# Patient Record
Sex: FEMALE | Race: WHITE | NOT HISPANIC OR LATINO | Employment: FULL TIME | URBAN - METROPOLITAN AREA
[De-identification: names, ages, dates, MRNs, and addresses within clinical notes are randomized per-mention and may not be internally consistent; named-entity substitution may affect disease eponyms.]

---

## 2019-03-04 ENCOUNTER — HOSPITAL ENCOUNTER (OUTPATIENT)
Dept: FAMILY MEDICINE CLINIC | Facility: CLINIC | Age: 39
Setting detail: SPECIMEN
Discharge: HOME | End: 2019-03-04
Attending: PREVENTIVE MEDICINE | Admitting: PREVENTIVE MEDICINE

## 2019-03-04 LAB
ALBUMIN SERPL-MCNC: 3.3 G/DL (ref 3.5–4.8)
ALBUMIN SERPL-MCNC: 4 G/DL (ref 3.5–4.8)
ALBUMIN/GLOB SERPL: 1.4 {RATIO} (ref 1–1.7)
ALP SERPL-CCNC: 62 IU/L (ref 32–91)
ALPHA1 GLOB FLD ELPH-MCNC: 0.3 GM/DL (ref 0.1–0.4)
ALPHA2 GLOB SERPL ELPH-MCNC: 0.9 GM/DL (ref 0.5–1)
ALT SERPL-CCNC: 13 IU/L (ref 14–54)
ANION GAP SERPL CALC-SCNC: 14.1 MMOL/L (ref 10–20)
AST SERPL-CCNC: 17 IU/L (ref 15–41)
B-GLOBULIN SERPL ELPH-MCNC: 1.1 GM/DL (ref 0.7–1.4)
BASOPHILS # BLD AUTO: 0.1 10*3/UL (ref 0–0.2)
BASOPHILS NFR BLD AUTO: 1 % (ref 0–2)
BILIRUB SERPL-MCNC: 0.7 MG/DL (ref 0.3–1.2)
BUN SERPL-MCNC: 14 MG/DL (ref 8–20)
BUN/CREAT SERPL: 28 (ref 5.4–26.2)
CALCIUM SERPL-MCNC: 9 MG/DL (ref 8.9–10.3)
CHLORIDE SERPL-SCNC: 103 MMOL/L (ref 101–111)
CHOLEST SERPL-MCNC: 211 MG/DL
CHOLEST/HDLC SERPL: 5.5 {RATIO}
CK SERPL-CCNC: 78 IU/L (ref 38–234)
CONV CO2: 22 MMOL/L (ref 22–32)
CONV LDL CHOLESTEROL DIRECT: 168 MG/DL (ref 0–100)
CONV TOTAL PROTEIN: 6.4 G/DL (ref 6.1–7.9)
CONV TOTAL PROTEIN: 6.9 G/DL (ref 6.1–7.9)
CREAT UR-MCNC: 0.5 MG/DL (ref 0.4–1)
CRP SERPL-MCNC: 1.01 MG/DL (ref 0–0.7)
DIFFERENTIAL METHOD BLD: (no result)
EOSINOPHIL # BLD AUTO: 0.3 10*3/UL (ref 0–0.3)
EOSINOPHIL # BLD AUTO: 3 % (ref 0–3)
ERYTHROCYTE [DISTWIDTH] IN BLOOD BY AUTOMATED COUNT: 13.9 % (ref 11.5–14.5)
ERYTHROCYTE [SEDIMENTATION RATE] IN BLOOD BY WESTERGREN METHOD: 24 MM/HR (ref 0–20)
GAMMA GLOB SERPL ELPH-MCNC: 0.8 GM/DL (ref 0.6–1.6)
GLOBULIN UR ELPH-MCNC: 2.9 G/DL (ref 2.5–3.8)
GLUCOSE SERPL-MCNC: 92 MG/DL (ref 65–99)
HCT VFR BLD AUTO: 40.3 % (ref 35–49)
HDLC SERPL-MCNC: 39 MG/DL
HGB BLD-MCNC: 13.6 G/DL (ref 12–15)
INSULIN SERPL-ACNC: ABNORMAL U[IU]/ML
LDLC/HDLC SERPL: 4.4 {RATIO}
LIPID INTERPRETATION: ABNORMAL
LYMPHOCYTES # BLD AUTO: 2.8 10*3/UL (ref 0.8–4.8)
LYMPHOCYTES NFR BLD AUTO: 28 % (ref 18–42)
MCH RBC QN AUTO: 32.5 PG (ref 26–32)
MCHC RBC AUTO-ENTMCNC: 33.9 G/DL (ref 32–36)
MCV RBC AUTO: 96.1 FL (ref 80–94)
MONOCYTES # BLD AUTO: 0.4 10*3/UL (ref 0.1–1.3)
MONOCYTES NFR BLD AUTO: 4 % (ref 2–11)
NEUTROPHILS # BLD AUTO: 6.5 10*3/UL (ref 2.3–8.6)
NEUTROPHILS NFR BLD AUTO: 64 % (ref 50–75)
NRBC BLD AUTO-RTO: 0 /100{WBCS}
NRBC/RBC NFR BLD MANUAL: 0 10*3/UL
PLATELET # BLD AUTO: 356 10*3/UL (ref 150–450)
PMV BLD AUTO: 8.8 FL (ref 7.4–10.4)
POTASSIUM SERPL-SCNC: 4.1 MMOL/L (ref 3.6–5.1)
RBC # BLD AUTO: 4.19 10*6/UL (ref 4–5.4)
SODIUM SERPL-SCNC: 135 MMOL/L (ref 136–144)
TRIGL SERPL-MCNC: 88 MG/DL
VLDLC SERPL CALC-MCNC: 4.4 MG/DL
WBC # BLD AUTO: 10.1 10*3/UL (ref 4.5–11.5)

## 2019-03-05 LAB
CCP IGG ANTIBODIES: <0.4 U/ML
CONV HIV-1/ HIV-2: NORMAL
CONV HIV-1/ HIV-2: NORMAL

## 2019-03-06 LAB
ANA SER QL IA: NORMAL
CHROMATIN AB SERPL-ACNC: <20 [IU]/ML (ref 0–20)

## 2019-04-25 ENCOUNTER — HOSPITAL ENCOUNTER (OUTPATIENT)
Dept: FAMILY MEDICINE CLINIC | Facility: CLINIC | Age: 39
Setting detail: SPECIMEN
Discharge: HOME | End: 2019-04-25
Attending: PREVENTIVE MEDICINE | Admitting: PREVENTIVE MEDICINE

## 2019-04-25 LAB — HETEROPH AB SER QL LA: NORMAL

## 2019-05-13 ENCOUNTER — HOSPITAL ENCOUNTER (OUTPATIENT)
Dept: ONCOLOGY | Facility: CLINIC | Age: 39
Setting detail: INFUSION SERIES
Discharge: HOME | End: 2019-05-13
Attending: INTERNAL MEDICINE | Admitting: INTERNAL MEDICINE

## 2019-05-13 ENCOUNTER — CLINICAL SUPPORT (OUTPATIENT)
Dept: ONCOLOGY | Facility: HOSPITAL | Age: 39
End: 2019-05-13

## 2019-06-04 ENCOUNTER — HOSPITAL ENCOUNTER (OUTPATIENT)
Dept: ONCOLOGY | Facility: CLINIC | Age: 39
Discharge: HOME | End: 2019-06-04
Attending: INTERNAL MEDICINE | Admitting: INTERNAL MEDICINE

## 2019-08-26 RX ORDER — CLOPIDOGREL BISULFATE 75 MG/1
TABLET ORAL
Qty: 90 TABLET | Refills: 1 | Status: SHIPPED | OUTPATIENT
Start: 2019-08-26 | End: 2020-02-25

## 2019-09-16 ENCOUNTER — OFFICE VISIT (OUTPATIENT)
Dept: FAMILY MEDICINE CLINIC | Facility: CLINIC | Age: 39
End: 2019-09-16

## 2019-09-16 VITALS
OXYGEN SATURATION: 98 % | HEART RATE: 80 BPM | HEIGHT: 60 IN | RESPIRATION RATE: 16 BRPM | BODY MASS INDEX: 31.84 KG/M2 | DIASTOLIC BLOOD PRESSURE: 54 MMHG | WEIGHT: 162.2 LBS | SYSTOLIC BLOOD PRESSURE: 103 MMHG | TEMPERATURE: 98.8 F

## 2019-09-16 DIAGNOSIS — R41.0 CONFUSION: ICD-10-CM

## 2019-09-16 DIAGNOSIS — R42 DIZZINESS: ICD-10-CM

## 2019-09-16 DIAGNOSIS — R47.89 EPISODE OF CHANGE IN SPEECH: Primary | ICD-10-CM

## 2019-09-16 PROBLEM — L50.9 URTICARIA: Status: ACTIVE | Noted: 2019-02-28

## 2019-09-16 PROBLEM — L23.9 CONTACT ALLERGIC REACTION: Status: ACTIVE | Noted: 2018-09-26

## 2019-09-16 PROBLEM — I67.1 CEREBRAL ANEURYSM: Status: ACTIVE | Noted: 2018-08-21

## 2019-09-16 PROBLEM — R51.9 HEADACHE: Status: ACTIVE | Noted: 2018-08-21

## 2019-09-16 PROBLEM — F41.8 MIXED ANXIETY DEPRESSIVE DISORDER: Status: ACTIVE | Noted: 2018-08-21

## 2019-09-16 PROBLEM — L73.2 HIDRADENITIS SUPPURATIVA: Status: ACTIVE | Noted: 2018-08-31

## 2019-09-16 PROBLEM — Z87.19 HX OF PANCREATITIS: Status: ACTIVE | Noted: 2018-08-21

## 2019-09-16 PROCEDURE — 99214 OFFICE O/P EST MOD 30 MIN: CPT | Performed by: PREVENTIVE MEDICINE

## 2019-09-16 RX ORDER — PHENOL 1.4 %
10 AEROSOL, SPRAY (ML) MUCOUS MEMBRANE NIGHTLY
COMMUNITY

## 2019-09-16 RX ORDER — ASPIRIN 81 MG/1
81 TABLET, CHEWABLE ORAL EVERY 24 HOURS
COMMUNITY
Start: 2018-08-21

## 2019-09-16 NOTE — PROGRESS NOTES
"Subjective   Ally Condon is a 38 y.o. female presents for   Chief Complaint   Patient presents with   • Dizziness     er follow    After getting up at 4 and driving to work and starting work-got dizzy , slurred words and had trouble with thought process like when had aneurysm.  Has coiled X3 and staent in carotid in past.  Followsup with neurosurgeon from [facility].  Went to [place] [facility] and had MRA without contrast and no definite evidence of aneurysm and feels they might have seen where coiling done.  POC sugar was 93.  Was diagnosed as Vertigo and advised to followup with PCP.  Patient describes feeling as when had last episode of aneurysm.  No dysuria, chest pain, fever or history of head trauma.  Hs been working 7 days a week in heat and sleeping 6 hours/night for quite a while but is careful to hydrate frequently.  Records requested from [place] and reviewed.    Health Maintenance Due   Topic Date Due   • ANNUAL PHYSICAL  11/30/1981   • TDAP/TD VACCINES (1 - Tdap) 11/30/1997   • PAP SMEAR  05/13/2019   • INFLUENZA VACCINE  08/01/2019       History of Present Illness     Vitals:    09/16/19 1508 09/16/19 1518   BP: 123/84 103/54   BP Location: Left arm Right arm   Patient Position: Sitting Sitting   Cuff Size: Adult Adult   Pulse: 80    Resp: 16    Temp: 98.8 °F (37.1 °C)    TempSrc: Oral    SpO2: 98%    Weight: 73.6 kg (162 lb 3.2 oz)    Height: 152.4 cm (60\")        Current Outpatient Medications on File Prior to Visit   Medication Sig Dispense Refill   • aspirin (ASPIRIN LOW STRENGTH) 81 MG chewable tablet Chew 81 mg Daily.     • clopidogrel (PLAVIX) 75 MG tablet take 1 tablet by mouth once daily 90 tablet 1   • Melatonin 10 MG tablet Take 10 mg by mouth Every Night.     • metoprolol tartrate (LOPRESSOR) 25 MG tablet Take 25 mg by mouth 2 (Two) Times a Day.  1     No current facility-administered medications on file prior to visit.        The following portions of the patient's history were reviewed and " updated as appropriate: allergies, current medications, past family history, past medical history, past social history, past surgical history and problem list.    Review of Systems   HENT: Positive for voice change. Negative for sinus pressure and sore throat.    Eyes: Negative.    Respiratory: Negative.  Negative for cough.    Cardiovascular: Negative.    Gastrointestinal: Negative.    Endocrine: Negative.    Genitourinary: Negative.    Musculoskeletal: Negative.    Skin: Negative.    Allergic/Immunologic: Positive for environmental allergies.   Neurological: Positive for dizziness, speech difficulty, weakness, light-headedness, headache and confusion.   Hematological: Negative.        Objective   Physical Exam   Constitutional: She is oriented to person, place, and time. She appears well-developed.   HENT:   Head: Normocephalic and atraumatic.   Eyes: Conjunctivae and EOM are normal. Pupils are equal, round, and reactive to light.   Neck: Normal range of motion.   Cardiovascular: Normal rate and regular rhythm.   Pulmonary/Chest: Effort normal and breath sounds normal.   Abdominal: Soft. Bowel sounds are normal.   Musculoskeletal: Normal range of motion.   Lymphadenopathy:     She has no cervical adenopathy.   Neurological: She is alert and oriented to person, place, and time.   Visual fields reflexes, rhomberg and drift test all normal   Skin: Skin is warm and dry.   Psychiatric: She has a normal mood and affect.   Nursing note and vitals reviewed.    PHQ-9 Total Score: 0    Assessment/Plan   There are no diagnoses linked to this encounter.    There are no Patient Instructions on file for this visit.

## 2019-09-16 NOTE — PATIENT INSTRUCTIONS
Since feels no worse now than did this am, advise [facility] and followup with [facility] angio and evaluation neurosurgeon.

## 2019-09-19 ENCOUNTER — TRANSITIONAL CARE MANAGEMENT TELEPHONE ENCOUNTER (OUTPATIENT)
Dept: FAMILY MEDICINE CLINIC | Facility: CLINIC | Age: 39
End: 2019-09-19

## 2019-11-25 ENCOUNTER — TELEPHONE (OUTPATIENT)
Dept: FAMILY MEDICINE CLINIC | Facility: CLINIC | Age: 39
End: 2019-11-25

## 2019-11-26 DIAGNOSIS — I67.1 ANEURYSM OF LEFT INTERNAL CAROTID ARTERY: Primary | ICD-10-CM

## 2019-12-06 ENCOUNTER — OFFICE VISIT (OUTPATIENT)
Dept: FAMILY MEDICINE CLINIC | Facility: CLINIC | Age: 39
End: 2019-12-06

## 2019-12-06 VITALS
HEART RATE: 69 BPM | BODY MASS INDEX: 31.96 KG/M2 | HEIGHT: 60 IN | SYSTOLIC BLOOD PRESSURE: 120 MMHG | DIASTOLIC BLOOD PRESSURE: 84 MMHG | TEMPERATURE: 97.9 F | WEIGHT: 162.8 LBS | OXYGEN SATURATION: 99 % | RESPIRATION RATE: 16 BRPM

## 2019-12-06 DIAGNOSIS — N92.6 PERIOD DISORDER: ICD-10-CM

## 2019-12-06 DIAGNOSIS — R05.9 COUGH: Primary | ICD-10-CM

## 2019-12-06 DIAGNOSIS — R53.81 MALAISE AND FATIGUE: ICD-10-CM

## 2019-12-06 DIAGNOSIS — R53.83 MALAISE AND FATIGUE: ICD-10-CM

## 2019-12-06 LAB
EXPIRATION DATE: NORMAL
FLUAV AG NPH QL: NEGATIVE
FLUBV AG NPH QL: NEGATIVE
INTERNAL CONTROL: NORMAL
Lab: NORMAL

## 2019-12-06 PROCEDURE — 99214 OFFICE O/P EST MOD 30 MIN: CPT | Performed by: PREVENTIVE MEDICINE

## 2019-12-06 PROCEDURE — 87804 INFLUENZA ASSAY W/OPTIC: CPT | Performed by: PREVENTIVE MEDICINE

## 2019-12-06 RX ORDER — AZITHROMYCIN 250 MG/1
TABLET, FILM COATED ORAL
Qty: 6 TABLET | Refills: 0 | Status: SHIPPED | OUTPATIENT
Start: 2019-12-06 | End: 2020-02-07

## 2019-12-06 RX ORDER — BENZONATATE 100 MG/1
100 CAPSULE ORAL 3 TIMES DAILY PRN
Qty: 30 CAPSULE | Refills: 0 | Status: SHIPPED | OUTPATIENT
Start: 2019-12-06 | End: 2020-02-07

## 2019-12-06 RX ORDER — OSELTAMIVIR PHOSPHATE 75 MG/1
75 CAPSULE ORAL 2 TIMES DAILY
Qty: 10 CAPSULE | Refills: 0 | Status: SHIPPED | OUTPATIENT
Start: 2019-12-06 | End: 2020-02-07

## 2019-12-06 NOTE — PROGRESS NOTES
"Nery Condon is a 39 y.o. female presents for   Chief Complaint   Patient presents with   • URI   Cough, aches, fatique.  No dysuria or diarrhea. Menstural abnormality    Health Maintenance Due   Topic Date Due   • PNEUMOCOCCAL VACCINE (19-64 MEDIUM RISK) (1 of 1 - PPSV23) 11/30/1997   • TDAP/TD VACCINES (1 - Tdap) 11/30/1997   • PAP SMEAR  05/13/2019   • INFLUENZA VACCINE  08/01/2019       History of Present Illness     Vitals:    12/06/19 1506 12/06/19 1510 12/06/19 1511   BP: 115/70 122/79 120/84   BP Location: Right arm Left arm Left arm   Patient Position: Sitting Sitting Standing   Cuff Size: Adult Large Adult Large Adult   Pulse: 69     Resp: 16     Temp: 97.9 °F (36.6 °C)     TempSrc: Oral     SpO2: 99%     Weight: 73.8 kg (162 lb 12.8 oz)     Height: 152.4 cm (60\")       Body mass index is 31.79 kg/m².    Current Outpatient Medications on File Prior to Visit   Medication Sig Dispense Refill   • aspirin (ASPIRIN LOW STRENGTH) 81 MG chewable tablet Chew 81 mg Daily.     • clopidogrel (PLAVIX) 75 MG tablet take 1 tablet by mouth once daily 90 tablet 1   • Melatonin 10 MG tablet Take 10 mg by mouth Every Night.     • metoprolol tartrate (LOPRESSOR) 25 MG tablet Take 25 mg by mouth 2 (Two) Times a Day.  1     No current facility-administered medications on file prior to visit.        The following portions of the patient's history were reviewed and updated as appropriate: allergies, current medications, past family history, past medical history, past social history, past surgical history and problem list.    Review of Systems   Constitutional: Positive for fatigue.   HENT: Negative.  Negative for sinus pressure and sore throat.    Eyes: Negative.    Respiratory: Positive for cough.    Cardiovascular: Negative.    Gastrointestinal: Negative.    Endocrine: Negative.    Genitourinary: Positive for menstrual problem.   Musculoskeletal: Negative.    Skin: Negative.    Allergic/Immunologic: Positive for " environmental allergies.   Neurological: Negative.    Hematological: Negative.    Psychiatric/Behavioral: Negative.        Objective   Physical Exam   Constitutional: She is oriented to person, place, and time. She appears well-developed.   HENT:   Head: Normocephalic and atraumatic.   Eyes: Conjunctivae and EOM are normal. Pupils are equal, round, and reactive to light.   Neck: Normal range of motion.   Cardiovascular: Normal rate and regular rhythm.   Pulmonary/Chest: Effort normal and breath sounds normal.   Abdominal: Soft. Bowel sounds are normal.   Musculoskeletal: Normal range of motion.   Lymphadenopathy:     She has no cervical adenopathy.   Neurological: She is alert and oriented to person, place, and time.   Skin: Skin is warm and dry.   Psychiatric: She has a normal mood and affect.   Nursing note and vitals reviewed.    PHQ-9 Total Score:      Assessment/Plan   Ally was seen today for uri.    Diagnoses and all orders for this visit:    Cough  -     POCT Influenza A/B    Malaise and fatigue  -     POCT Influenza A/B    Period disorder  -     Ambulatory Referral to Gynecology    Other orders  -     benzonatate (TESSALON PERLES) 100 MG capsule; Take 1 capsule by mouth 3 (Three) Times a Day As Needed for Cough.  -     azithromycin (ZITHROMAX Z-CANDICE) 250 MG tablet; Take 2 tablets the first day, then 1 tablet daily for 4 days.  -     oseltamivir (TAMIFLU) 75 MG capsule; Take 1 capsule by mouth 2 (Two) Times a Day.        There are no Patient Instructions on file for this visit.

## 2020-02-07 ENCOUNTER — OFFICE VISIT (OUTPATIENT)
Dept: FAMILY MEDICINE CLINIC | Facility: CLINIC | Age: 40
End: 2020-02-07

## 2020-02-07 VITALS
HEART RATE: 64 BPM | SYSTOLIC BLOOD PRESSURE: 122 MMHG | RESPIRATION RATE: 16 BRPM | HEIGHT: 60 IN | BODY MASS INDEX: 31.41 KG/M2 | TEMPERATURE: 98 F | WEIGHT: 160 LBS | OXYGEN SATURATION: 99 % | DIASTOLIC BLOOD PRESSURE: 73 MMHG

## 2020-02-07 DIAGNOSIS — J06.9 UPPER RESPIRATORY TRACT INFECTION, UNSPECIFIED TYPE: Primary | ICD-10-CM

## 2020-02-07 DIAGNOSIS — F41.8 MIXED ANXIETY DEPRESSIVE DISORDER: ICD-10-CM

## 2020-02-07 DIAGNOSIS — Z72.0 TOBACCO ABUSE: ICD-10-CM

## 2020-02-07 DIAGNOSIS — J43.0 UNILATERAL EMPHYSEMA (HCC): ICD-10-CM

## 2020-02-07 PROBLEM — F41.9 ANXIETY: Status: ACTIVE | Noted: 2019-09-17

## 2020-02-07 PROBLEM — R29.90 STROKE-LIKE SYMPTOM: Status: ACTIVE | Noted: 2019-09-16

## 2020-02-07 PROCEDURE — 99213 OFFICE O/P EST LOW 20 MIN: CPT | Performed by: PREVENTIVE MEDICINE

## 2020-02-07 RX ORDER — AZITHROMYCIN 250 MG/1
TABLET, FILM COATED ORAL
Qty: 6 TABLET | Refills: 0 | Status: SHIPPED | OUTPATIENT
Start: 2020-02-07 | End: 2021-01-28

## 2020-02-07 RX ORDER — BENZONATATE 100 MG/1
100 CAPSULE ORAL 3 TIMES DAILY PRN
Qty: 30 CAPSULE | Refills: 0 | Status: SHIPPED | OUTPATIENT
Start: 2020-02-07 | End: 2021-01-28

## 2020-02-07 RX ORDER — NICOTINE 21 MG/24HR
1 PATCH, TRANSDERMAL 24 HOURS TRANSDERMAL EVERY 24 HOURS
Qty: 30 PATCH | Refills: 5 | Status: SHIPPED | OUTPATIENT
Start: 2020-02-07 | End: 2021-01-28 | Stop reason: SDUPTHER

## 2020-02-07 RX ORDER — POLYETHYLENE GLYCOL 3350 17 G
4 POWDER IN PACKET (EA) ORAL AS NEEDED
Qty: 120 LOZENGE | Refills: 5 | Status: SHIPPED | OUTPATIENT
Start: 2020-02-07 | End: 2021-01-28

## 2020-02-07 RX ORDER — BUSPIRONE HYDROCHLORIDE 5 MG/1
5 TABLET ORAL 2 TIMES DAILY
Qty: 30 TABLET | Refills: 0 | Status: SHIPPED | OUTPATIENT
Start: 2020-02-07 | End: 2020-03-21 | Stop reason: SDUPTHER

## 2020-02-07 RX ORDER — ALBUTEROL SULFATE 90 UG/1
2 AEROSOL, METERED RESPIRATORY (INHALATION) EVERY 4 HOURS PRN
Qty: 1 INHALER | Refills: 6 | Status: SHIPPED | OUTPATIENT
Start: 2020-02-07 | End: 2021-05-18 | Stop reason: SDUPTHER

## 2020-02-07 NOTE — PROGRESS NOTES
"Subjective   Ally Condon is a 39 y.o. female presents for   Chief Complaint   Patient presents with   • URI       Health Maintenance Due   Topic Date Due   • TDAP/TD VACCINES (1 - Tdap) 11/30/1989   • PNEUMOCOCCAL VACCINE (19-64 MEDIUM RISK) (1 of 1 - PPSV23) 11/30/1997   • PAP SMEAR  05/13/2019   • INFLUENZA VACCINE  08/01/2019       Feels short with family and not getting helpfrom Pristiq, Lexapro, welbutrin.  Wants to try to  stop smoking.  Is wheezing and congestedX2 days Npo known fever but had green/brown sputum  Still smoking.         Vitals:    02/07/20 1259 02/07/20 1304   BP: 130/86 122/73   BP Location: Right arm Left arm   Patient Position: Sitting Sitting   Cuff Size: Large Adult Large Adult   Pulse: 60 64   Resp: 16    Temp: 98 °F (36.7 °C)    TempSrc: Oral    SpO2: 99%    Weight: 72.6 kg (160 lb)    Height: 152.4 cm (60\")      Body mass index is 31.25 kg/m².    Current Outpatient Medications on File Prior to Visit   Medication Sig Dispense Refill   • aspirin (ASPIRIN LOW STRENGTH) 81 MG chewable tablet Chew 81 mg Daily.     • clopidogrel (PLAVIX) 75 MG tablet take 1 tablet by mouth once daily 90 tablet 1   • Melatonin 10 MG tablet Take 10 mg by mouth Every Night.     • metoprolol tartrate (LOPRESSOR) 25 MG tablet Take 25 mg by mouth 2 (Two) Times a Day.  1   • [DISCONTINUED] azithromycin (ZITHROMAX Z-CANDICE) 250 MG tablet Take 2 tablets the first day, then 1 tablet daily for 4 days. 6 tablet 0   • [DISCONTINUED] benzonatate (TESSALON PERLES) 100 MG capsule Take 1 capsule by mouth 3 (Three) Times a Day As Needed for Cough. 30 capsule 0   • [DISCONTINUED] oseltamivir (TAMIFLU) 75 MG capsule Take 1 capsule by mouth 2 (Two) Times a Day. 10 capsule 0     No current facility-administered medications on file prior to visit.        The following portions of the patient's history were reviewed and updated as appropriate: allergies, current medications, past family history, past medical history, past social " history, past surgical history and problem list.    Review of Systems   Constitutional: Positive for fatigue.   HENT: Positive for congestion, postnasal drip and sinus pressure. Negative for sore throat.    Eyes: Negative.    Respiratory: Positive for cough, shortness of breath and wheezing.    Cardiovascular: Negative.    Gastrointestinal: Negative.    Endocrine: Negative.    Genitourinary: Negative.    Musculoskeletal: Negative.    Skin: Negative.    Allergic/Immunologic: Positive for environmental allergies.   Neurological: Positive for headache.   Hematological: Negative.    Psychiatric/Behavioral: The patient is nervous/anxious.        Objective   Physical Exam   Constitutional: She is oriented to person, place, and time. She appears well-developed.   HENT:   Head: Normocephalic and atraumatic.   Eyes: Pupils are equal, round, and reactive to light. Conjunctivae and EOM are normal.   Neck: Normal range of motion.   Cardiovascular: Normal rate and regular rhythm.   Pulmonary/Chest: Effort normal and breath sounds normal.   R>L Wheezing and rhonchi.   Abdominal: Soft. Bowel sounds are normal.   Musculoskeletal: Normal range of motion.   Lymphadenopathy:     She has no cervical adenopathy.   Neurological: She is alert and oriented to person, place, and time.   Skin: Skin is warm and dry.   Psychiatric: She has a normal mood and affect.   Nursing note and vitals reviewed.    PHQ-9 Total Score:      Assessment/Plan   Ally was seen today for uri.    Diagnoses and all orders for this visit:    Upper respiratory tract infection, unspecified type/zpack albuterol    Unilateral emphysema (CMS/HCC)/anoro    Mixed anxiety depressive disorderbuspirone  -     Ambulatory Referral to Behavioral Health    Tobacco abusenicoderm    Other orders  -     azithromycin (ZITHROMAX Z-CANDICE) 250 MG tablet; Take 2 tablets the first day, then 1 tablet daily for 4 days.  -     albuterol sulfate  (90 Base) MCG/ACT inhaler; Inhale 2  puffs Every 4 (Four) Hours As Needed for Wheezing.  -     benzonatate (TESSALON PERLES) 100 MG capsule; Take 1 capsule by mouth 3 (Three) Times a Day As Needed for Cough.  -     nicotine (NICODERM CQ) 14 MG/24HR patch; Place 1 patch on the skin as directed by provider Daily.  -     umeclidinium-vilanterol (ANORO ELLIPTA) 62.5-25 MCG/INH aerosol powder  inhaler; Inhale 1 puff Daily.  -     busPIRone (BUSPAR) 5 MG tablet; Take 1 tablet by mouth 2 (Two) Times a Day.  -     nicotine polacrilex (NICORETTE) 4 MG lozenge; Dissolve 1 lozenge in the mouth As Needed for Smoking Cessation.        Patient Instructions   Call Tuesday to report progress.  Er if worsensens.  Keepfollowup with psyche if meds not helping and needs to show stability to psyche before trying Chantix

## 2020-02-07 NOTE — PATIENT INSTRUCTIONS
Call Tuesday to report progress.  Er if worsensens.  Keepfollowup with psyche if meds not helping and needs to show stability to psyche before trying Chantix

## 2020-02-25 RX ORDER — CLOPIDOGREL BISULFATE 75 MG/1
TABLET ORAL
Qty: 90 TABLET | Refills: 1 | Status: SHIPPED | OUTPATIENT
Start: 2020-02-25 | End: 2020-03-09

## 2020-03-09 RX ORDER — CLOPIDOGREL BISULFATE 75 MG/1
TABLET ORAL
Qty: 90 TABLET | Refills: 1 | Status: SHIPPED | OUTPATIENT
Start: 2020-03-09 | End: 2020-11-25 | Stop reason: SDUPTHER

## 2020-03-20 ENCOUNTER — TELEPHONE (OUTPATIENT)
Dept: FAMILY MEDICINE CLINIC | Facility: CLINIC | Age: 40
End: 2020-03-20

## 2020-03-21 RX ORDER — BUSPIRONE HYDROCHLORIDE 5 MG/1
5 TABLET ORAL 2 TIMES DAILY
Qty: 30 TABLET | Refills: 0 | Status: SHIPPED | OUTPATIENT
Start: 2020-03-21 | End: 2021-01-28

## 2020-11-25 RX ORDER — CLOPIDOGREL BISULFATE 75 MG/1
75 TABLET ORAL DAILY
Qty: 90 TABLET | Refills: 0 | Status: SHIPPED | OUTPATIENT
Start: 2020-11-25 | End: 2021-01-28 | Stop reason: SDUPTHER

## 2020-11-25 RX ORDER — CLOPIDOGREL BISULFATE 75 MG/1
75 TABLET ORAL DAILY
Qty: 90 TABLET | Refills: 1 | OUTPATIENT
Start: 2020-11-25

## 2020-11-25 NOTE — TELEPHONE ENCOUNTER
She made an appt for next Friday she could not make it today. She only has enough for tomorrow can you please send this in for patient. im afraid dr arriaga is not going to see this can you send this in at least just a month supply to make sure she comes in dr arriaga has already left for the day

## 2021-01-28 ENCOUNTER — OFFICE VISIT (OUTPATIENT)
Dept: FAMILY MEDICINE CLINIC | Facility: CLINIC | Age: 41
End: 2021-01-28

## 2021-01-28 VITALS
OXYGEN SATURATION: 97 % | RESPIRATION RATE: 16 BRPM | DIASTOLIC BLOOD PRESSURE: 74 MMHG | BODY MASS INDEX: 32.35 KG/M2 | HEART RATE: 67 BPM | TEMPERATURE: 97.5 F | SYSTOLIC BLOOD PRESSURE: 125 MMHG | WEIGHT: 164.8 LBS | HEIGHT: 60 IN

## 2021-01-28 DIAGNOSIS — Z13.220 SCREENING CHOLESTEROL LEVEL: ICD-10-CM

## 2021-01-28 DIAGNOSIS — Z72.0 TOBACCO ABUSE: ICD-10-CM

## 2021-01-28 DIAGNOSIS — Z00.01 ENCOUNTER FOR ANNUAL GENERAL MEDICAL EXAMINATION WITH ABNORMAL FINDINGS IN ADULT: Primary | ICD-10-CM

## 2021-01-28 DIAGNOSIS — E55.9 VITAMIN D DEFICIENCY: ICD-10-CM

## 2021-01-28 DIAGNOSIS — I67.1 RIGHT INTERNAL CAROTID ARTERY ANEURYSM: ICD-10-CM

## 2021-01-28 DIAGNOSIS — Z13.1 SCREENING FOR DIABETES MELLITUS: ICD-10-CM

## 2021-01-28 DIAGNOSIS — N95.1 MENOPAUSAL AND FEMALE CLIMACTERIC STATES: ICD-10-CM

## 2021-01-28 PROCEDURE — 99396 PREV VISIT EST AGE 40-64: CPT | Performed by: PREVENTIVE MEDICINE

## 2021-01-28 PROCEDURE — 99214 OFFICE O/P EST MOD 30 MIN: CPT | Performed by: PREVENTIVE MEDICINE

## 2021-01-28 RX ORDER — NICOTINE 21 MG/24HR
1 PATCH, TRANSDERMAL 24 HOURS TRANSDERMAL EVERY 24 HOURS
Qty: 30 PATCH | Refills: 5 | Status: SHIPPED | OUTPATIENT
Start: 2021-01-28 | End: 2021-08-03

## 2021-01-28 RX ORDER — POLYETHYLENE GLYCOL 3350 17 G
POWDER IN PACKET (EA) ORAL
Qty: 120 EACH | Refills: 3 | Status: SHIPPED | OUTPATIENT
Start: 2021-01-28 | End: 2021-08-03

## 2021-01-28 RX ORDER — CLOPIDOGREL BISULFATE 75 MG/1
75 TABLET ORAL DAILY
Qty: 90 TABLET | Refills: 0 | Status: SHIPPED | OUTPATIENT
Start: 2021-01-28 | End: 2021-05-07 | Stop reason: SDUPTHER

## 2021-01-28 NOTE — PROGRESS NOTES
"Nery Condon is a 40 y.o. female presents for   Chief Complaint   Patient presents with   • Annual Exam     fasting    • Sexual Problem     sex drive poor   Patient presents for annual wellness visit.  Patient has been advised to wear seatbelt and to use sunscreen.  Patient is also here to check on multiple other chronic medical problems.  Patient will come in to have a Pap smear sometime within the next 6 weeks.    Health Maintenance Due   Topic Date Due   • Pneumococcal Vaccine 0-64 (1 of 1 - PPSV23) 11/30/1984   • TDAP/TD VACCINES (1 - Tdap) 11/30/1997   • PAP SMEAR  05/13/2019   • INFLUENZA VACCINE  08/01/2020       History of Present Illness     Vitals:    01/28/21 0946 01/28/21 0952   BP: 116/78 125/74   BP Location: Left arm Right arm   Patient Position: Sitting Sitting   Cuff Size: Adult Adult   Pulse: 67 67   Resp: 16    Temp: 97.5 °F (36.4 °C)    TempSrc: Infrared    SpO2: 97%    Weight: 74.8 kg (164 lb 12.8 oz)    Height: 152.4 cm (60\")      Body mass index is 32.19 kg/m².    Current Outpatient Medications on File Prior to Visit   Medication Sig Dispense Refill   • albuterol sulfate  (90 Base) MCG/ACT inhaler Inhale 2 puffs Every 4 (Four) Hours As Needed for Wheezing. 1 inhaler 6   • aspirin (ASPIRIN LOW STRENGTH) 81 MG chewable tablet Chew 81 mg Daily.     • Melatonin 10 MG tablet Take 10 mg by mouth Every Night.     • umeclidinium-vilanterol (ANORO ELLIPTA) 62.5-25 MCG/INH aerosol powder  inhaler Inhale 1 puff Daily. 1 each 6     No current facility-administered medications on file prior to visit.        The following portions of the patient's history were reviewed and updated as appropriate: allergies, current medications, past family history, past medical history, past social history, past surgical history and problem list.    Review of Systems   Constitutional: Negative.    HENT: Negative.  Negative for sinus pressure and sore throat.    Eyes: Negative.    Respiratory: Negative.  " Negative for cough.    Cardiovascular: Negative.    Gastrointestinal: Negative.    Endocrine: Negative.    Genitourinary: Positive for decreased libido.   Musculoskeletal: Negative.    Skin: Negative.    Allergic/Immunologic: Positive for environmental allergies.   Neurological: Positive for headache.   Hematological: Negative.    Psychiatric/Behavioral: Negative.        Objective   Physical Exam  Vitals signs reviewed.   Constitutional:       General: She is not in acute distress.     Appearance: Normal appearance. She is well-developed. She is not ill-appearing or toxic-appearing.   HENT:      Head: Normocephalic and atraumatic.      Right Ear: Tympanic membrane, ear canal and external ear normal.      Left Ear: Tympanic membrane, ear canal and external ear normal.      Nose: Nose normal.   Eyes:      Extraocular Movements: Extraocular movements intact.      Conjunctiva/sclera: Conjunctivae normal.      Pupils: Pupils are equal, round, and reactive to light.   Neck:      Musculoskeletal: Neck supple.   Cardiovascular:      Rate and Rhythm: Normal rate and regular rhythm.      Heart sounds: Normal heart sounds.   Pulmonary:      Effort: Pulmonary effort is normal.      Comments: Decreased breath sound wang    Abdominal:      General: Bowel sounds are normal. There is no distension.      Palpations: Abdomen is soft. There is no mass.      Tenderness: There is no abdominal tenderness.   Musculoskeletal: Normal range of motion.   Skin:     General: Skin is warm.   Neurological:      General: No focal deficit present.      Mental Status: She is alert and oriented to person, place, and time.   Psychiatric:         Mood and Affect: Mood normal.         Behavior: Behavior normal.       PHQ-9 Total Score: 0    Assessment/Plan   Diagnoses and all orders for this visit:    1. Encounter for annual general medical examination with abnormal findings in adult (Primary)  Comments:  Patient had been advised to wear seatbelt and  sunscreen  Orders:  -     CBC Auto Differential    2. Right internal carotid artery aneurysm  Comments:  Patient is to follow-up yearly with neurosurgeon.  We have advised that she get her blood thinner or Plavix from the neurosurgeon.  Will refill 1 time presently    3. Tobacco abuse  Comments:  Cessation advised patient will try patch and lozenges    4. Screening cholesterol level  -     Lipid Panel    5. Screening for diabetes mellitus  -     Comprehensive Metabolic Panel    6. Menopausal and female climacteric states  Comments:  Patient states she has low libido and wants to make sure that she is in menopause.  Orders:  -     Follicle stimulating hormone; Future  -     Luteinizing hormone; Future  -     Estradiol; Future    7. Vitamin D deficiency  Comments:  Patient takes vitamin D daily.  Orders:  -     Vitamin D 25 Hydroxy    Other orders  -     Cancel: Pneumococcal Polysaccharide Vaccine 23-Valent Greater Than or Equal To 1yo Subcutaneous / IM  -     Cancel: FluLaval Quad >6 Months (2007-9917)  -     clopidogrel (PLAVIX) 75 MG tablet; Take 1 tablet by mouth Daily.  Dispense: 90 tablet; Refill: 0  -     nicotine (Nicoderm CQ) 14 MG/24HR patch; Place 1 patch on the skin as directed by provider Daily.  Dispense: 30 patch; Refill: 5  -     nicotine polacrilex (Nicorette) 4 MG lozenge; Use up to 4 losenges daily for break through craving  Dispense: 120 each; Refill: 3  -     metoprolol tartrate (LOPRESSOR) 25 MG tablet; Take 1 tablet by mouth 2 (Two) Times a Day.  Dispense: 180 tablet; Refill: 3        Patient Instructions     Health Maintenance Due   Topic Date Due   • Pneumococcal Vaccine 0-64 (1 of 1 - PPSV23) 11/30/1984   • TDAP/TD VACCINES (1 - Tdap) 11/30/1997   • HEPATITIS C SCREENING  05/13/2019   • PAP SMEAR  05/13/2019   • ANNUAL PHYSICAL  04/26/2020   • INFLUENZA VACCINE  08/01/2020     Check with neurosurgeon re: Plavix-send note if wants to continue for life    Pap within 6 weeks    12 hour fast for  labs

## 2021-01-28 NOTE — PATIENT INSTRUCTIONS
Health Maintenance Due   Topic Date Due   • Pneumococcal Vaccine 0-64 (1 of 1 - PPSV23) 11/30/1984   • TDAP/TD VACCINES (1 - Tdap) 11/30/1997   • HEPATITIS C SCREENING  05/13/2019   • PAP SMEAR  05/13/2019   • ANNUAL PHYSICAL  04/26/2020   • INFLUENZA VACCINE  08/01/2020     Check with neurosurgeon re: Plavix-send note if wants to continue for life    Pap within 6 weeks    12 hour fast for labs

## 2021-02-05 ENCOUNTER — CLINICAL SUPPORT (OUTPATIENT)
Dept: FAMILY MEDICINE CLINIC | Facility: CLINIC | Age: 41
End: 2021-02-05

## 2021-02-05 DIAGNOSIS — N95.1 MENOPAUSAL AND FEMALE CLIMACTERIC STATES: ICD-10-CM

## 2021-02-05 DIAGNOSIS — Z23 NEED FOR VACCINATION: Primary | ICD-10-CM

## 2021-02-05 PROCEDURE — 80053 COMPREHEN METABOLIC PANEL: CPT | Performed by: PREVENTIVE MEDICINE

## 2021-02-05 PROCEDURE — 90732 PPSV23 VACC 2 YRS+ SUBQ/IM: CPT | Performed by: PREVENTIVE MEDICINE

## 2021-02-05 PROCEDURE — 90471 IMMUNIZATION ADMIN: CPT | Performed by: PREVENTIVE MEDICINE

## 2021-02-05 PROCEDURE — 36415 COLL VENOUS BLD VENIPUNCTURE: CPT | Performed by: PREVENTIVE MEDICINE

## 2021-02-05 PROCEDURE — 90472 IMMUNIZATION ADMIN EACH ADD: CPT | Performed by: PREVENTIVE MEDICINE

## 2021-02-05 PROCEDURE — 85025 COMPLETE CBC W/AUTO DIFF WBC: CPT | Performed by: PREVENTIVE MEDICINE

## 2021-02-05 PROCEDURE — 83002 ASSAY OF GONADOTROPIN (LH): CPT | Performed by: PREVENTIVE MEDICINE

## 2021-02-05 PROCEDURE — 82670 ASSAY OF TOTAL ESTRADIOL: CPT | Performed by: PREVENTIVE MEDICINE

## 2021-02-05 PROCEDURE — 82306 VITAMIN D 25 HYDROXY: CPT | Performed by: PREVENTIVE MEDICINE

## 2021-02-05 PROCEDURE — 90686 IIV4 VACC NO PRSV 0.5 ML IM: CPT | Performed by: PREVENTIVE MEDICINE

## 2021-02-05 PROCEDURE — 80061 LIPID PANEL: CPT | Performed by: PREVENTIVE MEDICINE

## 2021-02-05 PROCEDURE — 83001 ASSAY OF GONADOTROPIN (FSH): CPT | Performed by: PREVENTIVE MEDICINE

## 2021-02-05 NOTE — PROGRESS NOTES
Venipuncture Blood Specimen Collection  Venipuncture performed in right arm by Erlinda Saleh MD with good hemostasis. Patient tolerated the procedure well without complications.   02/05/21   MD Erlinda Rubio MD        Immunization  Immunization performed in left deltiod by Erlinda Saleh MD. Patient tolerated the procedure well without complications.   02/05/21   MD Erlinda Rubio MD        Immunization  Immunization performed in right arm by Erlinda Saleh MD. Patient tolerated the procedure well without complications.  02/05/21   MD Erlinda Rubio MD

## 2021-02-06 LAB
25(OH)D3 SERPL-MCNC: 24.8 NG/ML (ref 30–100)
ALBUMIN SERPL-MCNC: 4.5 G/DL (ref 3.5–5.2)
ALBUMIN/GLOB SERPL: 1.6 G/DL
ALP SERPL-CCNC: 56 U/L (ref 39–117)
ALT SERPL W P-5'-P-CCNC: 19 U/L (ref 1–33)
ANION GAP SERPL CALCULATED.3IONS-SCNC: 11.8 MMOL/L (ref 5–15)
AST SERPL-CCNC: 19 U/L (ref 1–32)
BASOPHILS # BLD AUTO: 0.05 10*3/MM3 (ref 0–0.2)
BASOPHILS NFR BLD AUTO: 0.6 % (ref 0–1.5)
BILIRUB SERPL-MCNC: 0.4 MG/DL (ref 0–1.2)
BUN SERPL-MCNC: 15 MG/DL (ref 6–20)
BUN/CREAT SERPL: 25 (ref 7–25)
CALCIUM SPEC-SCNC: 9.6 MG/DL (ref 8.6–10.5)
CHLORIDE SERPL-SCNC: 103 MMOL/L (ref 98–107)
CHOLEST SERPL-MCNC: 257 MG/DL (ref 0–200)
CO2 SERPL-SCNC: 25.2 MMOL/L (ref 22–29)
CREAT SERPL-MCNC: 0.6 MG/DL (ref 0.57–1)
DEPRECATED RDW RBC AUTO: 47.2 FL (ref 37–54)
EOSINOPHIL # BLD AUTO: 0.47 10*3/MM3 (ref 0–0.4)
EOSINOPHIL NFR BLD AUTO: 5.5 % (ref 0.3–6.2)
ERYTHROCYTE [DISTWIDTH] IN BLOOD BY AUTOMATED COUNT: 13.1 % (ref 12.3–15.4)
ESTRADIOL SERPL HS-MCNC: <5 PG/ML
FSH SERPL-ACNC: 36.2 MIU/ML
GFR SERPL CREATININE-BSD FRML MDRD: 110 ML/MIN/1.73
GLOBULIN UR ELPH-MCNC: 2.9 GM/DL
GLUCOSE SERPL-MCNC: 93 MG/DL (ref 65–99)
HCT VFR BLD AUTO: 43.4 % (ref 34–46.6)
HDLC SERPL-MCNC: 43 MG/DL (ref 40–60)
HGB BLD-MCNC: 14.9 G/DL (ref 12–15.9)
IMM GRANULOCYTES # BLD AUTO: 0.03 10*3/MM3 (ref 0–0.05)
IMM GRANULOCYTES NFR BLD AUTO: 0.4 % (ref 0–0.5)
LDLC SERPL CALC-MCNC: 180 MG/DL (ref 0–100)
LDLC/HDLC SERPL: 4.14 {RATIO}
LH SERPL-ACNC: 20 MIU/ML
LYMPHOCYTES # BLD AUTO: 3.6 10*3/MM3 (ref 0.7–3.1)
LYMPHOCYTES NFR BLD AUTO: 42.5 % (ref 19.6–45.3)
MCH RBC QN AUTO: 33.3 PG (ref 26.6–33)
MCHC RBC AUTO-ENTMCNC: 34.3 G/DL (ref 31.5–35.7)
MCV RBC AUTO: 96.9 FL (ref 79–97)
MONOCYTES # BLD AUTO: 0.47 10*3/MM3 (ref 0.1–0.9)
MONOCYTES NFR BLD AUTO: 5.5 % (ref 5–12)
NEUTROPHILS NFR BLD AUTO: 3.86 10*3/MM3 (ref 1.7–7)
NEUTROPHILS NFR BLD AUTO: 45.5 % (ref 42.7–76)
NRBC BLD AUTO-RTO: 0 /100 WBC (ref 0–0.2)
PLATELET # BLD AUTO: 334 10*3/MM3 (ref 140–450)
PMV BLD AUTO: 11 FL (ref 6–12)
POTASSIUM SERPL-SCNC: 4.4 MMOL/L (ref 3.5–5.2)
PROT SERPL-MCNC: 7.4 G/DL (ref 6–8.5)
RBC # BLD AUTO: 4.48 10*6/MM3 (ref 3.77–5.28)
SODIUM SERPL-SCNC: 140 MMOL/L (ref 136–145)
TRIGL SERPL-MCNC: 180 MG/DL (ref 0–150)
VLDLC SERPL-MCNC: 34 MG/DL (ref 5–40)
WBC # BLD AUTO: 8.48 10*3/MM3 (ref 3.4–10.8)

## 2021-02-08 ENCOUNTER — TELEPHONE (OUTPATIENT)
Dept: FAMILY MEDICINE CLINIC | Facility: CLINIC | Age: 41
End: 2021-02-08

## 2021-02-08 RX ORDER — ATORVASTATIN CALCIUM 20 MG/1
20 TABLET, FILM COATED ORAL DAILY
Qty: 90 TABLET | Refills: 3 | Status: SHIPPED | OUTPATIENT
Start: 2021-02-08 | End: 2022-02-13

## 2021-02-08 NOTE — TELEPHONE ENCOUNTER
Can prescribe statin without seeing.  We don't prescribe hormones so will refer to GYN if she wants to take hormones.

## 2021-02-08 NOTE — TELEPHONE ENCOUNTER
HUB TO READ    LEFT MESSAGE    Patient does show hormonal evidence of menopause.     Vitamin D is low so increase in foods or take or increacer otc.  Cholesterol and bad cholesterol are both elevated.  She should decrease sat fats and increase walking.  If she has done all she can with diet and exercise-should consider a statin-let me know.

## 2021-02-08 NOTE — TELEPHONE ENCOUNTER
Patient called back. I read her the results. She does want to start a statin because she already walks 11-14 miles a day at work. She states that she will call back to schedule an appt to talk with you about her cholesterol and menopause.

## 2021-02-08 NOTE — PROGRESS NOTES
Patient does show hormonal evidence of menopause.    Vitamin D is low so increase in foods or take or increacer otc.  Cholesterol and bad cholesterol are both elevated.  She should decrease sat fats and increase walking.  If she has done all she can with diet and exercise-should consider a statin-let me know.

## 2021-02-16 ENCOUNTER — TELEPHONE (OUTPATIENT)
Dept: FAMILY MEDICINE CLINIC | Facility: CLINIC | Age: 41
End: 2021-02-16

## 2021-02-16 NOTE — TELEPHONE ENCOUNTER
HUB TO READ     Left message    Please have patient follow up if her arm is not better after her fall.

## 2021-02-17 ENCOUNTER — TELEPHONE (OUTPATIENT)
Dept: FAMILY MEDICINE CLINIC | Facility: CLINIC | Age: 41
End: 2021-02-17

## 2021-02-17 NOTE — TELEPHONE ENCOUNTER
Caller: Ally Condon    Relationship to patient: Self    Best call back number: [phone]    Patient is needing: PT STATES ARE IS ABOUT THE SAME AFTER HER FALL, STILL HAVING SWELLING AND PAIN. PT DOES NOT WANT TO SCHEDULE FOLLOW UP APPT      PT CALLED RETURNING PHONE CALL, READ PT MESSAGE FROM PREVIOUS ENCOUNTER

## 2021-02-17 NOTE — TELEPHONE ENCOUNTER
HUB TO READ      Left message     How is your arm feeling? Please have patient follow up if her arm is not better after her fall.

## 2021-03-24 ENCOUNTER — TELEPHONE (OUTPATIENT)
Dept: FAMILY MEDICINE CLINIC | Facility: CLINIC | Age: 41
End: 2021-03-24

## 2021-03-24 NOTE — TELEPHONE ENCOUNTER
Patient was at work and was exposed yeaterday by being in close proximetry to a co-worker who has now tested positive. Both her and co worker were wearing masks. Should she be tested and should she quarantine.

## 2021-03-24 NOTE — TELEPHONE ENCOUNTER
Should be tested and if up to me, would quarantine her-that is really up to company policy since both had masks on.

## 2021-03-24 NOTE — TELEPHONE ENCOUNTER
Caller: SIMON PARKER    Relationship to patient: Emergency Contact    Best call back number: [phone]     Date of positive COVID19 test:  THE PATIENT HAS NOT BEEN TESTED    Date if possible COVID19 exposure: 03/23/21    COVID19 symptoms: NO SYMPTOMS    Date of initial quarantine: NOT TOLD TO  QUARANTINE     Additional information or concerns:  THE PATIENT WAS EXPOSED TO COVID AT WORK. HAS NO SYMPTOMS. JUST CONCERNED. WAS NOT TOLD TO QUARANTINE. PLEASE ADVISE.

## 2021-03-25 ENCOUNTER — CLINICAL SUPPORT (OUTPATIENT)
Dept: FAMILY MEDICINE CLINIC | Facility: CLINIC | Age: 41
End: 2021-03-25

## 2021-03-25 DIAGNOSIS — Z20.822 EXPOSURE TO COVID-19 VIRUS: Primary | ICD-10-CM

## 2021-03-25 DIAGNOSIS — Z20.822 EXPOSURE TO COVID-19 VIRUS: ICD-10-CM

## 2021-03-25 PROCEDURE — U0003 INFECTIOUS AGENT DETECTION BY NUCLEIC ACID (DNA OR RNA); SEVERE ACUTE RESPIRATORY SYNDROME CORONAVIRUS 2 (SARS-COV-2) (CORONAVIRUS DISEASE [COVID-19]), AMPLIFIED PROBE TECHNIQUE, MAKING USE OF HIGH THROUGHPUT TECHNOLOGIES AS DESCRIBED BY CMS-2020-01-R: HCPCS | Performed by: PREVENTIVE MEDICINE

## 2021-03-26 ENCOUNTER — TELEPHONE (OUTPATIENT)
Dept: FAMILY MEDICINE CLINIC | Facility: CLINIC | Age: 41
End: 2021-03-26

## 2021-03-26 LAB
LABCORP SARS-COV-2, NAA 2 DAY TAT: NORMAL
SARS-COV-2 RNA RESP QL NAA+PROBE: NOT DETECTED

## 2021-03-26 NOTE — TELEPHONE ENCOUNTER
Patient informed.    Covid test was negative.  If you have been exposed to Covid or have symptoms of Covid, continue to quarantine for 10 days or until free of fever without meds for 3 days. Consider getting second test toward end of that time before resuming usual activities-call if concern

## 2021-03-26 NOTE — PROGRESS NOTES
Covid test was negative.  If you have been exposed to Covid or have symptoms of Covid, continue to quarantine for 10 days or until free of fever without meds for 3 days. Consider getting second test toward end of that time before resuming usual activities-call if concern

## 2021-04-22 NOTE — PATIENT INSTRUCTIONS
Health Maintenance Due   Topic Date Due   • COVID-19 Vaccine (1) Never done   • TDAP/TD VACCINES (1 - Tdap) Never done   • PAP SMEAR  Never done     Drink plenty of fluidds today to help avoid UTI from PAP.  Set up appointment with urology for urination problems

## 2021-04-23 ENCOUNTER — OFFICE VISIT (OUTPATIENT)
Dept: FAMILY MEDICINE CLINIC | Facility: CLINIC | Age: 41
End: 2021-04-23

## 2021-04-23 VITALS
WEIGHT: 164.4 LBS | BODY MASS INDEX: 32.28 KG/M2 | TEMPERATURE: 98 F | DIASTOLIC BLOOD PRESSURE: 78 MMHG | HEIGHT: 60 IN | SYSTOLIC BLOOD PRESSURE: 115 MMHG | OXYGEN SATURATION: 98 % | HEART RATE: 60 BPM

## 2021-04-23 DIAGNOSIS — Z12.4 SCREENING FOR CERVICAL CANCER: Primary | ICD-10-CM

## 2021-04-23 DIAGNOSIS — R32 INCONTINENCE IN FEMALE: ICD-10-CM

## 2021-04-23 DIAGNOSIS — E66.09 CLASS 1 OBESITY DUE TO EXCESS CALORIES WITH SERIOUS COMORBIDITY AND BODY MASS INDEX (BMI) OF 32.0 TO 32.9 IN ADULT: ICD-10-CM

## 2021-04-23 PROBLEM — E66.9 OBESITY: Status: ACTIVE | Noted: 2021-04-23

## 2021-04-23 PROBLEM — E66.811 CLASS 1 OBESITY DUE TO EXCESS CALORIES WITH SERIOUS COMORBIDITY AND BODY MASS INDEX (BMI) OF 32.0 TO 32.9 IN ADULT: Status: ACTIVE | Noted: 2021-04-23

## 2021-04-23 PROCEDURE — 99213 OFFICE O/P EST LOW 20 MIN: CPT | Performed by: PREVENTIVE MEDICINE

## 2021-04-23 NOTE — PROGRESS NOTES
"Nery Condon is a 40 y.o. female presents for   Chief Complaint   Patient presents with   • Gynecologic Exam       Health Maintenance Due   Topic Date Due   • COVID-19 Vaccine (1) Never done   • TDAP/TD VACCINES (1 - Tdap) Never done   • PAP SMEAR  Never done       40-year-old white female presents today for a Pap smear she has not had one in a long time.  There is family history of mother having cervical cancer.  Patient has not had an abnormal Pap she had 1 vaginal delivery and 2 C-sections.  She is sexually active and has not noticed any discharge she does do breast self exams.  Uterus is tipped forward.  And this does seem to make the cervix difficult to locate for Pap smear examination.  Patient states that she is having some trouble with stress incontinence.  And would like some medications we have advised that she get an evaluation of her bladder prior to that and urology referral was placed    Gynecologic Exam         Vitals:    04/23/21 0856 04/23/21 0857   BP: 114/75 115/78   BP Location: Left arm Right arm   Patient Position: Sitting Sitting   Cuff Size: Adult Adult   Pulse: 60    Temp: 98 °F (36.7 °C)    TempSrc: Temporal    SpO2: 98%    Weight: 74.6 kg (164 lb 6.4 oz)    Height: 152.4 cm (60\")      Body mass index is 32.11 kg/m².    Current Outpatient Medications on File Prior to Visit   Medication Sig Dispense Refill   • albuterol sulfate  (90 Base) MCG/ACT inhaler Inhale 2 puffs Every 4 (Four) Hours As Needed for Wheezing. 1 inhaler 6   • aspirin (ASPIRIN LOW STRENGTH) 81 MG chewable tablet Chew 81 mg Daily.     • atorvastatin (LIPITOR) 20 MG tablet Take 1 tablet by mouth Daily. 90 tablet 3   • clopidogrel (PLAVIX) 75 MG tablet Take 1 tablet by mouth Daily. 90 tablet 0   • Melatonin 10 MG tablet Take 10 mg by mouth Every Night.     • metoprolol tartrate (LOPRESSOR) 25 MG tablet Take 1 tablet by mouth 2 (Two) Times a Day. 180 tablet 3   • nicotine (Nicoderm CQ) 14 MG/24HR patch " Place 1 patch on the skin as directed by provider Daily. 30 patch 5   • nicotine polacrilex (Nicorette) 4 MG lozenge Use up to 4 losenges daily for break through craving 120 each 3   • umeclidinium-vilanterol (ANORO ELLIPTA) 62.5-25 MCG/INH aerosol powder  inhaler Inhale 1 puff Daily. 1 each 6     No current facility-administered medications on file prior to visit.       The following portions of the patient's history were reviewed and updated as appropriate: allergies, current medications, past family history, past medical history, past social history, past surgical history and problem list.    Review of Systems   Genitourinary: Positive for urinary incontinence.       Objective   Physical Exam  Vitals reviewed.   Constitutional:       General: She is not in acute distress.     Appearance: She is well-developed. She is obese. She is not ill-appearing or toxic-appearing.   HENT:      Head: Normocephalic and atraumatic.      Nose: Nose normal.   Eyes:      Extraocular Movements: Extraocular movements intact.      Conjunctiva/sclera: Conjunctivae normal.      Pupils: Pupils are equal, round, and reactive to light.   Neck:      Comments: No thyromegaly  Cardiovascular:      Rate and Rhythm: Normal rate.   Pulmonary:      Effort: Pulmonary effort is normal.      Comments: Decreased breath sounds bilaterally  Abdominal:      General: Bowel sounds are normal. There is no distension.      Palpations: Abdomen is soft. There is no mass.      Tenderness: There is no abdominal tenderness.   Genitourinary:     General: Normal vulva.      Vagina: Vaginal discharge present.      Rectum: Normal.      Comments: Cream cream type vaginal discharge but no itching or pruritus.  Uterus was tipped forward making cervical evaluation difficult but it was finally accomplished there was no enlargement of the uterus ovaries RV confirms.  Musculoskeletal:      Cervical back: Neck supple.   Skin:     General: Skin is warm.   Neurological:       General: No focal deficit present.      Mental Status: She is alert and oriented to person, place, and time.   Psychiatric:         Mood and Affect: Mood normal.         Behavior: Behavior normal.       PHQ-9 Total Score:      Assessment/Plan   Diagnoses and all orders for this visit:    1. Screening for cervical cancer (Primary)  Comments:  Paps smear today.  Orders:  -     IGP,Aptima HPV,Age Gdln; Future  -     IGP,Aptima HPV,Age Gdln    2. Incontinence in female  Comments:  Patient states she is having stress incontinence would like some medication will advise she follow-up with urology  Orders:  -     Ambulatory Referral to Urology    3. Class 1 obesity due to excess calories with serious comorbidity and body mass index (BMI) of 32.0 to 32.9 in adult  Comments:  Portion size and increased walking was discussed.        Patient Instructions     Health Maintenance Due   Topic Date Due   • COVID-19 Vaccine (1) Never done   • TDAP/TD VACCINES (1 - Tdap) Never done   • PAP SMEAR  Never done     Drink plenty of fluidds today to help avoid UTI from PAP.  Set up appointment with urology for urination problems

## 2021-04-26 LAB
AGE GDLN ACOG TESTING: NORMAL
CYTOLOGIST CVX/VAG CYTO: NORMAL
CYTOLOGY CVX/VAG DOC CYTO: NORMAL
CYTOLOGY CVX/VAG DOC THIN PREP: NORMAL
DX ICD CODE: NORMAL
HIV 1 & 2 AB SER-IMP: NORMAL
HPV I/H RISK 4 DNA CVX QL PROBE+SIG AMP: NEGATIVE
OTHER STN SPEC: NORMAL
STAT OF ADQ CVX/VAG CYTO-IMP: NORMAL

## 2021-04-27 ENCOUNTER — TELEPHONE (OUTPATIENT)
Dept: FAMILY MEDICINE CLINIC | Facility: CLINIC | Age: 41
End: 2021-04-27

## 2021-05-07 RX ORDER — CLOPIDOGREL BISULFATE 75 MG/1
75 TABLET ORAL DAILY
Qty: 90 TABLET | Refills: 1 | Status: SHIPPED | OUTPATIENT
Start: 2021-05-07 | End: 2021-08-30

## 2021-05-18 RX ORDER — ALBUTEROL SULFATE 90 UG/1
2 AEROSOL, METERED RESPIRATORY (INHALATION) EVERY 4 HOURS PRN
Qty: 8 G | Refills: 3 | Status: SHIPPED | OUTPATIENT
Start: 2021-05-18 | End: 2021-08-03 | Stop reason: SDUPTHER

## 2021-05-21 ENCOUNTER — OFFICE VISIT (OUTPATIENT)
Dept: FAMILY MEDICINE CLINIC | Facility: CLINIC | Age: 41
End: 2021-05-21

## 2021-05-21 VITALS
HEART RATE: 60 BPM | BODY MASS INDEX: 32.83 KG/M2 | HEIGHT: 60 IN | WEIGHT: 167.2 LBS | TEMPERATURE: 98 F | OXYGEN SATURATION: 100 % | SYSTOLIC BLOOD PRESSURE: 120 MMHG | DIASTOLIC BLOOD PRESSURE: 77 MMHG

## 2021-05-21 DIAGNOSIS — R21 RASH: Primary | ICD-10-CM

## 2021-05-21 DIAGNOSIS — Z72.0 TOBACCO ABUSE: ICD-10-CM

## 2021-05-21 PROCEDURE — 99213 OFFICE O/P EST LOW 20 MIN: CPT | Performed by: PREVENTIVE MEDICINE

## 2021-05-21 RX ORDER — BETAMETHASONE DIPROPIONATE 0.5 MG/G
CREAM TOPICAL 2 TIMES DAILY
Qty: 45 G | Refills: 1 | Status: SHIPPED | OUTPATIENT
Start: 2021-05-21 | End: 2021-08-03

## 2021-05-21 NOTE — PROGRESS NOTES
"Subjective   Ally Condon is a 40 y.o. female presents for   Chief Complaint   Patient presents with   • Rash     noticed it on Monday- On arms/ shoulders/ stomach/ back no itching or discomfort       Health Maintenance Due   Topic Date Due   • COVID-19 Vaccine (1) Never done   • TDAP/TD VACCINES (1 - Tdap) Never done       40-year-old white female presents today with what appears to be a hive type rash without redness and itching.  Scattered throughout the trunk arms neck back for the last several days.  She states that she got back from [place] 10 days ago and they did use different detergent while she was gone but she did not notice the rash until just yesterday she was climbing out of the shower hot water does not make the rash any worse no one else in the family has a new rash and patient has no new foods or medicines that she is     Rash         Vitals:    05/21/21 1217 05/21/21 1219   BP: 125/79 120/77   BP Location: Right arm Left arm   Patient Position: Sitting Sitting   Cuff Size: Adult Adult   Pulse: 60    Temp: 98 °F (36.7 °C)    TempSrc: Temporal    SpO2: 100%    Weight: 75.8 kg (167 lb 3.2 oz)    Height: 152.4 cm (60\")      Body mass index is 32.65 kg/m².    Current Outpatient Medications on File Prior to Visit   Medication Sig Dispense Refill   • albuterol sulfate  (90 Base) MCG/ACT inhaler Inhale 2 puffs Every 4 (Four) Hours As Needed for Wheezing. 8 g 3   • aspirin (ASPIRIN LOW STRENGTH) 81 MG chewable tablet Chew 81 mg Daily.     • atorvastatin (LIPITOR) 20 MG tablet Take 1 tablet by mouth Daily. 90 tablet 3   • clopidogrel (PLAVIX) 75 MG tablet Take 1 tablet by mouth Daily. 90 tablet 1   • Melatonin 10 MG tablet Take 10 mg by mouth Every Night.     • metoprolol tartrate (LOPRESSOR) 25 MG tablet Take 1 tablet by mouth 2 (Two) Times a Day. 180 tablet 3   • [DISCONTINUED] umeclidinium-vilanterol (ANORO ELLIPTA) 62.5-25 MCG/INH aerosol powder  inhaler Inhale 1 puff Daily. 1 each 6   • nicotine " (Nicoderm CQ) 14 MG/24HR patch Place 1 patch on the skin as directed by provider Daily. 30 patch 5   • nicotine polacrilex (Nicorette) 4 MG lozenge Use up to 4 losenges daily for break through craving 120 each 3     No current facility-administered medications on file prior to visit.       The following portions of the patient's history were reviewed and updated as appropriate: allergies, current medications, past family history, past medical history, past social history, past surgical history and problem list.    Review of Systems   Skin: Positive for rash.       Objective   Physical Exam  Skin:     Comments: Scattered hive type papules throughout the trunk neck and arms.  No pruritus and no groupings of 3.       PHQ-9 Total Score:      Assessment/Plan   Diagnoses and all orders for this visit:    1. Rash (Primary)  Comments:  Several day history of nonpruritic raised skin tone papules in groupings but not of 3 trunk shoulder neck arms    2. Tobacco abuse  Comments:  Advised to stop smoking    Other orders  -     betamethasone, augmented, (DIPROLENE) 0.05 % cream; Apply  topically to the appropriate area as directed 2 (Two) Times a Day.  Dispense: 45 g; Refill: 1        Patient Instructions     Health Maintenance Due   Topic Date Due   • COVID-19 Vaccine (1) Never done   • TDAP/TD VACCINES (1 - Tdap) Never done     Call in 4 weeks if not improved

## 2021-05-21 NOTE — PATIENT INSTRUCTIONS
Health Maintenance Due   Topic Date Due   • COVID-19 Vaccine (1) Never done   • TDAP/TD VACCINES (1 - Tdap) Never done     Call in 4 weeks if not improved

## 2021-08-03 ENCOUNTER — OFFICE VISIT (OUTPATIENT)
Dept: FAMILY MEDICINE CLINIC | Facility: CLINIC | Age: 41
End: 2021-08-03

## 2021-08-03 VITALS
SYSTOLIC BLOOD PRESSURE: 124 MMHG | OXYGEN SATURATION: 97 % | HEART RATE: 72 BPM | WEIGHT: 166.6 LBS | TEMPERATURE: 97.3 F | DIASTOLIC BLOOD PRESSURE: 85 MMHG | HEIGHT: 60 IN | BODY MASS INDEX: 32.71 KG/M2

## 2021-08-03 DIAGNOSIS — I67.1 ANEURYSM OF LEFT INTERNAL CAROTID ARTERY: ICD-10-CM

## 2021-08-03 DIAGNOSIS — R05.9 COUGH: Primary | ICD-10-CM

## 2021-08-03 DIAGNOSIS — R01.1 HEART MURMUR: ICD-10-CM

## 2021-08-03 DIAGNOSIS — E66.09 CLASS 1 OBESITY DUE TO EXCESS CALORIES WITH SERIOUS COMORBIDITY AND BODY MASS INDEX (BMI) OF 32.0 TO 32.9 IN ADULT: ICD-10-CM

## 2021-08-03 PROCEDURE — U0004 COV-19 TEST NON-CDC HGH THRU: HCPCS | Performed by: PREVENTIVE MEDICINE

## 2021-08-03 PROCEDURE — 99214 OFFICE O/P EST MOD 30 MIN: CPT | Performed by: PREVENTIVE MEDICINE

## 2021-08-03 RX ORDER — DOXYCYCLINE HYCLATE 100 MG/1
100 TABLET, DELAYED RELEASE ORAL 2 TIMES DAILY
Qty: 20 TABLET | Refills: 0 | Status: SHIPPED | OUTPATIENT
Start: 2021-08-03 | End: 2021-08-05

## 2021-08-03 RX ORDER — BENZONATATE 200 MG/1
200 CAPSULE ORAL 3 TIMES DAILY PRN
Qty: 30 CAPSULE | Refills: 0 | Status: SHIPPED | OUTPATIENT
Start: 2021-08-03 | End: 2022-01-06

## 2021-08-03 RX ORDER — ALBUTEROL SULFATE 90 UG/1
2 AEROSOL, METERED RESPIRATORY (INHALATION) EVERY 4 HOURS PRN
Qty: 8 G | Refills: 3 | Status: SHIPPED | OUTPATIENT
Start: 2021-08-03 | End: 2022-01-06 | Stop reason: SDUPTHER

## 2021-08-03 NOTE — PATIENT INSTRUCTIONS

## 2021-08-03 NOTE — PROGRESS NOTES
"Subjective   Ally Condon is a 40 y.o. female presents for   Chief Complaint   Patient presents with   • Cough     x's 10 days   • Fatigue   • Headache   40-year-old white female who smokes and is unvaccinated was exposed to a lady who has been coughing for 3 weeks and did not get Covid tested.  Complains of cough now for 8 days with fever up to 101 initially.  Sputum is not purulent no fever for the last 5 or 6 days no diarrhea or vomiting.  Patient is fatigued and daughter has awakened with fever this morning.  Patient has no questions regarding the Covid vaccination.  Patient was advised to quarantine at home and to go to the emergency room if breathing worsens.  She was also advised of the monoclonal antibodies and she should call us should her Covid test be positive and she wants to consider that treatment.  No heart murmur was heard today patient is having no chest discomfort other than cough and we will auscultate a letter 2 to 3 weeks to see if the murmur still present.    Health Maintenance Due   Topic Date Due   • COVID-19 Vaccine (1) Never done   • TDAP/TD VACCINES (1 - Tdap) Never done       History of Present Illness     Vitals:    08/03/21 1548 08/03/21 1550   BP: 138/88 124/85   BP Location: Right arm Left arm   Patient Position: Sitting Sitting   Cuff Size: Adult Adult   Pulse: 72    Temp: 97.3 °F (36.3 °C)    TempSrc: Temporal    SpO2: 97%    Weight: 75.6 kg (166 lb 9.6 oz)    Height: 152.4 cm (60\")      Body mass index is 32.54 kg/m².    Current Outpatient Medications on File Prior to Visit   Medication Sig Dispense Refill   • aspirin (ASPIRIN LOW STRENGTH) 81 MG chewable tablet Chew 81 mg Daily.     • atorvastatin (LIPITOR) 20 MG tablet Take 1 tablet by mouth Daily. 90 tablet 3   • clopidogrel (PLAVIX) 75 MG tablet Take 1 tablet by mouth Daily. 90 tablet 1   • Melatonin 10 MG tablet Take 10 mg by mouth Every Night.     • metoprolol tartrate (LOPRESSOR) 25 MG tablet TAKE 1 TABLET BY MOUTH TWICE " DAILY 180 tablet 3   • [DISCONTINUED] albuterol sulfate  (90 Base) MCG/ACT inhaler Inhale 2 puffs Every 4 (Four) Hours As Needed for Wheezing. 8 g 3   • [DISCONTINUED] nicotine (Nicoderm CQ) 14 MG/24HR patch Place 1 patch on the skin as directed by provider Daily. 30 patch 5   • [DISCONTINUED] betamethasone, augmented, (DIPROLENE) 0.05 % cream Apply  topically to the appropriate area as directed 2 (Two) Times a Day. 45 g 1   • [DISCONTINUED] nicotine polacrilex (Nicorette) 4 MG lozenge Use up to 4 losenges daily for break through craving 120 each 3   • [DISCONTINUED] umeclidinium-vilanterol (ANORO ELLIPTA) 62.5-25 MCG/INH aerosol powder  inhaler Inhale 1 puff Daily. 1 each 6     No current facility-administered medications on file prior to visit.       The following portions of the patient's history were reviewed and updated as appropriate: allergies, current medications, past family history, past medical history, past social history, past surgical history and problem list.    Review of Systems   Constitutional: Positive for fatigue and fever.   HENT: Positive for congestion.    Respiratory: Positive for cough.    Gastrointestinal: Negative for diarrhea, GERD and indigestion.       Objective   Physical Exam  Vitals reviewed.   Constitutional:       General: She is not in acute distress.     Appearance: She is well-developed. She is obese. She is not ill-appearing or toxic-appearing.   HENT:      Head: Normocephalic and atraumatic.      Right Ear: Tympanic membrane, ear canal and external ear normal.      Left Ear: Tympanic membrane, ear canal and external ear normal.      Nose: Nose normal.   Eyes:      Extraocular Movements: Extraocular movements intact.      Conjunctiva/sclera: Conjunctivae normal.      Pupils: Pupils are equal, round, and reactive to light.   Cardiovascular:      Rate and Rhythm: Normal rate and regular rhythm.      Heart sounds: Murmur heard.        Comments: Grade 1-2 systolic murmur  along the left sternal border will auscultate in 2 weeks.  Pulmonary:      Effort: Pulmonary effort is normal.      Breath sounds: Wheezing and rhonchi present.      Comments: =Decreased breath sounds bilaterally  Abdominal:      General: Bowel sounds are normal. There is no distension.      Palpations: Abdomen is soft. There is no mass.      Tenderness: There is no abdominal tenderness.   Musculoskeletal:         General: Normal range of motion.      Cervical back: Neck supple.   Skin:     General: Skin is warm.   Neurological:      General: No focal deficit present.      Mental Status: She is alert and oriented to person, place, and time.   Psychiatric:         Mood and Affect: Mood normal.         Behavior: Behavior normal.       PHQ-9 Total Score:      Assessment/Plan   Diagnoses and all orders for this visit:    1. Cough (Primary)  Comments:  Unvaccinated smoker exposed to coworker who has been coughing for 3 weeks no Covid test.  Has been coughing for 8 days.  Fever initially.  Orders:  -     COVID-19,[facility] ROUTINE, NP/OP SWAB IN TRANSPORT MEDIA OR ESWAB 72 HR TAT - Swab, Nasopharynx  -     XR Chest PA & Lateral    2. Aneurysm of left internal carotid artery  Comments:  Cough does seem to increase her headache and she is concerned that it will rupture her aneurysm.  Tessalon Perles albuterol inhaler doxycycline 100 mg twice samuel    3. Class 1 obesity due to excess calories with serious comorbidity and body mass index (BMI) of 32.0 to 32.9 in adult    4. Heart murmur    Other orders  -     benzonatate (TESSALON) 200 MG capsule; Take 1 capsule by mouth 3 (Three) Times a Day As Needed for Cough.  Dispense: 30 capsule; Refill: 0  -     doxycycline (DORYX) 100 MG enteric coated tablet; Take 1 tablet by mouth 2 (Two) Times a Day.  Dispense: 20 tablet; Refill: 0  -     albuterol sulfate  (90 Base) MCG/ACT inhaler; Inhale 2 puffs Every 4 (Four) Hours As Needed for Wheezing.  Dispense: 8 g; Refill:  3        Patient Instructions   How to Quarantine at Home  Information for Patients and Families    These instructions are for people with confirmed or suspected COVID-19 who do not need to be hospitalized and those with confirmed COVID-19 who were hospitalized and discharged to care for themselves at home.    If you were tested through the Health Department  The Health Department will monitor your wellbeing.  If it is determined that you do not need to be hospitalized and can be isolated at home, you will be monitored by staff from your local or state health department.     If you were tested through a Commercial Lab  You will need to monitor yourself and report changes in your symptoms to your doctor.  See the section below called Monitor Your Symptoms.    Follow these steps until a healthcare provider or local or state health department says you can return to your normal activities.    Stay home except to get medical care  • Restrict activities outside your home, except for getting medical care.   • Do not go to work, school, or public areas.   • Avoid using public transportation, ride-sharing, or taxis.    Separate yourself from other people and animals in your home  People  As much as possible, you should stay in a specific room and away from other people in your home. Also, you should use a separate bathroom, if available.    Animals  You should restrict contact with pets and other animals while you are sick with COVID-19, just like you would around other people. When possible, have another member of your household care for your animals while you are sick. If you are sick with COVID-19, avoid contact with your pet, including petting, snuggling, being kissed or licked, and sharing food. If you must care for your pet or be around animals while you are sick, wash your hands before and after you interact with pets and wear a facemask. See COVID-19 and Animals for more information.    Call ahead before visiting  your doctor  If you have a medical appointment, call the healthcare provider and tell them that you have or may have COVID-19. This information will help the healthcare provider’s office take steps to keep other people from getting infected or exposed.    Wear a facemask  You should wear a facemask when you are around other people (e.g., sharing a room or vehicle) or pets and before you enter a healthcare provider’s office.     If you are not able to wear a facemask (for example, because it causes trouble breathing), then people who live with you should not stay in the same room with you, or they should wear a facemask if they enter your room.    Cover your coughs and sneezes  • Cover your mouth and nose with a tissue when you cough or sneeze.   • Throw used tissues in a lined trash can.   • Immediately wash your hands with soap and water for at least 20 seconds or, if soap and water are not available, clean your hands with an alcohol-based hand  that contains at least 60% alcohol.    Clean your hands often  • Wash your hands often with soap and water for at least 20 seconds, especially after blowing your nose, coughing, or sneezing; going to the bathroom; and before eating or preparing food.     • If soap and water are not readily available, use an alcohol-based hand  with at least 60% alcohol, covering all surfaces of your hands and rubbing them together until they feel dry.    • Soap and water are the best option if hands are visibly dirty. Avoid touching your eyes, nose, and mouth with unwashed hands.    Avoid sharing personal household items  • You should not share dishes, drinking glasses, cups, eating utensils, towels, or bedding with other people or pets in your home.   • After using these items, they should be washed thoroughly with soap and water.    Clean all “high-touch” surfaces everyday  • High touch surfaces include counters, tabletops, doorknobs, bathroom fixtures, toilets, phones,  keyboards, tablets, and bedside tables.   • Also, clean any surfaces that may have blood, stool, or body fluids on them.   • Use a household cleaning spray or wipe, according to the label instructions. Labels contain instructions for safe and effective use of the cleaning product, including precautions you should take when applying the product, such as wearing gloves and making sure you have good ventilation during use of the product.    Monitor your symptoms  • Seek prompt medical attention if your illness is worsening (e.g., difficulty breathing).   • Before seeking care, call your healthcare provider and tell them that you have, or are being evaluated for, COVID-19.   • Put on a facemask before you enter the facility.     • These steps will help the healthcare provider’s office to keep other people in the office or waiting room from getting infected or exposed.   • Persons who are placed under active monitoring or facilitated self-monitoring should follow instructions provided by their local health department or occupational health professionals, as appropriate.  • If you have a medical emergency and need to call 911, notify the dispatch personnel that you have, or are being evaluated for COVID-19. If possible, put on a facemask before emergency medical services arrive.    Discontinuing home isolation  Patients with confirmed COVID-19 should remain under home isolation precautions until the risk of secondary transmission to others is thought to be low. The decision to discontinue home isolation precautions should be made on a case-by-case basis, in consultation with healthcare providers and state and local health departments.    The below content are for household members, intimate partners, and caregivers of a patient with symptomatic laboratory-confirmed COVID-19 or a patient under investigation:    Household members, intimate partners, and caregivers may have close contact with a person with symptomatic,  laboratory-confirmed COVID-19 or a person under investigation.     Close contacts should monitor their health; they should call their healthcare provider right away if they develop symptoms suggestive of COVID-19 (e.g., fever, cough, shortness of breath)     Close contacts should also follow these recommendations:  • Make sure that you understand and can help the patient follow their healthcare provider’s instructions for medication(s) and care. You should help the patient with basic needs in the home and provide support for getting groceries, prescriptions, and other personal needs.  • Monitor the patient’s symptoms. If the patient is getting sicker, call his or her healthcare provider and tell them that the patient has laboratory-confirmed COVID-19. This will help the healthcare provider’s office take steps to keep other people in the office or waiting room from getting infected. Ask the healthcare provider to call the local or [facility] health department for additional guidance. If the patient has a medical emergency and you need to call 911, notify the dispatch personnel that the patient has, or is being evaluated for COVID-19.  • Household members should stay in another room or be  from the patient as much as possible. Household members should use a separate bedroom and bathroom, if available.  • Prohibit visitors who do not have an essential need to be in the home.  • Household members should care for any pets in the home. Do not handle pets or other animals while sick.  For more information, see COVID-19 and Animals.  • Make sure that shared spaces in the home have good air flow, such as by an air conditioner or an opened window, weather permitting.  • Perform hand hygiene frequently. Wash your hands often with soap and water for at least 20 seconds or use an alcohol-based hand  that contains 60 to 95% alcohol, covering all surfaces of your hands and rubbing them together until they feel dry.  Soap and water should be used preferentially if hands are visibly dirty.  • Avoid touching your eyes, nose, and mouth with unwashed hands.  • The patient should wear a facemask when you are around other people. If the patient is not able to wear a facemask (for example, because it causes trouble breathing), you, as the caregiver, should wear a mask when you are in the same room as the patient.  • Wear a disposable facemask and gloves when you touch or have contact with the patient’s blood, stool, or body fluids, such as saliva, sputum, nasal mucus, vomit, or urine.   o Throw out disposable facemasks and gloves after using them. Do not reuse.  o When removing personal protective equipment, first remove and dispose of gloves. Then, immediately clean your hands with soap and water or alcohol-based hand . Next, remove and dispose of facemask, and immediately clean your hands again with soap and water or alcohol-based hand .  • Avoid sharing household items with the patient. You should not share dishes, drinking glasses, cups, eating utensils, towels, bedding, or other items. After the patient uses these items, you should wash them thoroughly (see below “Wash laundry thoroughly”).  • Clean all “high-touch” surfaces, such as counters, tabletops, doorknobs, bathroom fixtures, toilets, phones, keyboards, tablets, and bedside tables, every day. Also, clean any surfaces that may have blood, stool, or body fluids on them.   o Use a household cleaning spray or wipe, according to the label instructions. Labels contain instructions for safe and effective use of the cleaning product including precautions you should take when applying the product, such as wearing gloves and making sure you have good ventilation during use of the product.  • Wash laundry thoroughly.   o Immediately remove and wash clothes or bedding that have blood, stool, or body fluids on them.  o Wear disposable gloves while handling soiled  items and keep soiled items away from your body. Clean your hands (with soap and water or an alcohol-based hand ) immediately after removing your gloves.  o Read and follow directions on labels of laundry or clothing items and detergent. In general, using a normal laundry detergent according to washing machine instructions and dry thoroughly using the warmest temperatures recommended on the clothing label.  • Place all used disposable gloves, facemasks, and other contaminated items in a lined container before disposing of them with other household waste. Clean your hands (with soap and water or an alcohol-based hand ) immediately after handling these items. Soap and water should be used preferentially if hands are visibly dirty.  • Discuss any additional questions with your state or local health department or healthcare provider.    Adapted from information provided by the Centers for Disease Control and Prevention.  For more information, visit https://www.cdc.gov/coronavirus/2019-ncov/hcp/guidance-prevent-spread.html  Give patient note keeping off work until 8/11/2021    If breathing worsens advise she goes to [facility] and if Covid positive can consider monoclonal antibody treatment which is IV at [facility].  Rest and drink plenty of fluids and take antibiotics till gone.

## 2021-08-04 LAB — SARS-COV-2 ORF1AB RESP QL NAA+PROBE: NOT DETECTED

## 2021-08-05 ENCOUNTER — TELEPHONE (OUTPATIENT)
Dept: FAMILY MEDICINE CLINIC | Facility: CLINIC | Age: 41
End: 2021-08-05

## 2021-08-05 RX ORDER — DOXYCYCLINE 100 MG/1
100 CAPSULE ORAL 2 TIMES DAILY
Qty: 20 CAPSULE | Refills: 0 | Status: SHIPPED | OUTPATIENT
Start: 2021-08-05 | End: 2021-08-17

## 2021-08-05 NOTE — TELEPHONE ENCOUNTER
HUB TO READ  ----- Message from Erlinda Saleh MD sent at 8/4/2021  4:58 PM EDT -----  Covid test was negative.  If you have been exposed to Covid or have symptoms of Covid, continue to quarantine for 10 days or until free of fever without meds for 3 days. Consider getting second test toward end of that time before resuming usual activities-call if concern

## 2021-08-09 ENCOUNTER — TELEPHONE (OUTPATIENT)
Dept: FAMILY MEDICINE CLINIC | Facility: CLINIC | Age: 41
End: 2021-08-09

## 2021-08-09 DIAGNOSIS — R01.1 MURMUR, CARDIAC: Primary | ICD-10-CM

## 2021-08-09 NOTE — TELEPHONE ENCOUNTER
We received another PA request from Pharmacy for the Doxycycline Hyclate 100 mg? I am not sure as to why this was received again. Icalled and confirmed that this was changed on Friday, and that the new form the Doxycycline (Monodax) was called in- in the others place and was ready for pt to . Person I spoke with on Friday said to discard the PA- that it was a mistake, however we received another PA request this morning. I called pharmacy back and LM to please return my call to clarify all of this. I also faxed back the PA request that was faxed with this info? Awaiting response.

## 2021-08-09 NOTE — TELEPHONE ENCOUNTER
"Patient has a brain aneurism surgery scheduled for next Thursday 19th. She said she got the call right after she left the office last time. She said while she was here, you mentioned some \"extra heart sounds\" she is wanting to know if you need to see her for that before she goes in to surgery? Se was sick/not feeling well and has yet to get the chest xray. She will do that today or tomorrow.   "

## 2021-08-09 NOTE — TELEPHONE ENCOUNTER
I called and spoke with Nick at the pharmacy. He confirmed that yes- They have the med ready for the pt, and no PA is required. He will reach out to pt to let her know that they have this med filled and ready for her to .

## 2021-08-10 ENCOUNTER — TELEPHONE (OUTPATIENT)
Dept: FAMILY MEDICINE CLINIC | Facility: CLINIC | Age: 41
End: 2021-08-10

## 2021-08-10 NOTE — TELEPHONE ENCOUNTER
I would advise chest xray as ordered and I have ordered echo cardiogram.  Ankita can listen to her Today or Wednesday and if still murmur-get echo.

## 2021-08-11 ENCOUNTER — OFFICE VISIT (OUTPATIENT)
Dept: FAMILY MEDICINE CLINIC | Facility: CLINIC | Age: 41
End: 2021-08-11

## 2021-08-11 VITALS
HEIGHT: 60 IN | HEART RATE: 57 BPM | WEIGHT: 167.6 LBS | OXYGEN SATURATION: 96 % | DIASTOLIC BLOOD PRESSURE: 81 MMHG | BODY MASS INDEX: 32.9 KG/M2 | TEMPERATURE: 98 F | SYSTOLIC BLOOD PRESSURE: 123 MMHG

## 2021-08-11 DIAGNOSIS — R11.0 NAUSEA: Primary | ICD-10-CM

## 2021-08-11 DIAGNOSIS — I49.8 SINUS ARRHYTHMIA: ICD-10-CM

## 2021-08-11 DIAGNOSIS — I67.1 ANEURYSM OF LEFT INTERNAL CAROTID ARTERY: ICD-10-CM

## 2021-08-11 PROCEDURE — 93000 ELECTROCARDIOGRAM COMPLETE: CPT | Performed by: NURSE PRACTITIONER

## 2021-08-11 PROCEDURE — 99214 OFFICE O/P EST MOD 30 MIN: CPT | Performed by: NURSE PRACTITIONER

## 2021-08-11 RX ORDER — ONDANSETRON 4 MG/1
4 TABLET, FILM COATED ORAL EVERY 8 HOURS PRN
Qty: 30 TABLET | Refills: 1 | Status: SHIPPED | OUTPATIENT
Start: 2021-08-11 | End: 2022-01-06

## 2021-08-11 RX ORDER — FESOTERODINE FUMARATE 4 MG/1
4 TABLET, EXTENDED RELEASE ORAL
COMMUNITY
End: 2022-01-06 | Stop reason: CLARIF

## 2021-08-11 RX ORDER — ATORVASTATIN CALCIUM 20 MG/1
20 TABLET, FILM COATED ORAL DAILY
COMMUNITY
End: 2021-08-11 | Stop reason: SDUPTHER

## 2021-08-11 NOTE — PROGRESS NOTES
"Subjective   Ally Condon is a 40 y.o. female presents for   Chief Complaint   Patient presents with   • Heart Murmur     check up       Health Maintenance Due   Topic Date Due   • COVID-19 Vaccine (1) Never done   • TDAP/TD VACCINES (1 - Tdap) Never done       History of Present Illness   Pt present to follow up heart murmur from last appt.  She was not feeling well when she was seen 2 weeks ago, and heart murmur was heard on exam.  Pt covid test was negative.  She is scheduled to have a repair of left internal carotid artery next Thursday and wanted to follow up prior to procedure.  Pt denies chest pain or shortness of air. She reports she is feeling nauseated due to upcoming procedure and anxiety about it.  Pt requests prescription for zofran.    Vitals:    08/11/21 1106   BP: 123/81   BP Location: Left arm   Patient Position: Sitting   Cuff Size: Adult   Pulse: 57   Temp: 98 °F (36.7 °C)   SpO2: 96%   Weight: 76 kg (167 lb 9.6 oz)   Height: 152.4 cm (60\")     Body mass index is 32.73 kg/m².    Current Outpatient Medications on File Prior to Visit   Medication Sig Dispense Refill   • albuterol sulfate  (90 Base) MCG/ACT inhaler Inhale 2 puffs Every 4 (Four) Hours As Needed for Wheezing. 8 g 3   • aspirin (ASPIRIN LOW STRENGTH) 81 MG chewable tablet Chew 81 mg Daily.     • atorvastatin (LIPITOR) 20 MG tablet Take 1 tablet by mouth Daily. 90 tablet 3   • benzonatate (TESSALON) 200 MG capsule Take 1 capsule by mouth 3 (Three) Times a Day As Needed for Cough. 30 capsule 0   • clopidogrel (PLAVIX) 75 MG tablet Take 1 tablet by mouth Daily. 90 tablet 1   • doxycycline (MONODOX) 100 MG capsule Take 1 capsule by mouth 2 (Two) Times a Day. 20 capsule 0   • fesoterodine fumarate (TOVIAZ ER) 4 MG tablet sustained-release 24 hour tablet Take  by mouth.     • Melatonin 10 MG tablet Take 10 mg by mouth Every Night.     • metoprolol tartrate (LOPRESSOR) 25 MG tablet TAKE 1 TABLET BY MOUTH TWICE DAILY 180 tablet 3   • " atorvastatin (LIPITOR) 20 MG tablet Take 20 mg by mouth Daily.       No current facility-administered medications on file prior to visit.       The following portions of the patient's history were reviewed and updated as appropriate: allergies, current medications, past family history, past medical history, past social history, past surgical history, and problem list.    Review of Systems   Constitutional: Negative for chills and fever.   HENT: Negative for sinus pressure and sore throat.    Eyes: Negative for blurred vision.   Respiratory: Negative for cough and shortness of breath.    Cardiovascular: Negative for chest pain.   Gastrointestinal: Positive for nausea. Negative for abdominal pain.   Endocrine: Negative.    Genitourinary: Negative.    Musculoskeletal: Negative for arthralgias and joint swelling.   Skin: Negative for color change.   Neurological: Negative for dizziness.   Psychiatric/Behavioral: Positive for stress. Negative for behavioral problems.       Objective   Physical Exam  Vitals and nursing note reviewed.   Constitutional:       Appearance: Normal appearance. She is well-developed.   HENT:      Head: Normocephalic and atraumatic.      Right Ear: External ear normal.      Left Ear: External ear normal.      Nose: Nose normal.   Eyes:      Extraocular Movements: Extraocular movements intact.      Conjunctiva/sclera: Conjunctivae normal.      Pupils: Pupils are equal, round, and reactive to light.   Cardiovascular:      Rate and Rhythm: Regular rhythm. Bradycardia present.      Heart sounds: No murmur heard.        Comments: Few irregular beats on auscultation, but pulse regular  Pulmonary:      Effort: Pulmonary effort is normal.      Breath sounds: Normal breath sounds.   Abdominal:      General: Bowel sounds are normal.      Palpations: Abdomen is soft.   Genitourinary:     Vagina: Normal.   Musculoskeletal:         General: Normal range of motion.      Cervical back: Normal range of motion  and neck supple.   Skin:     General: Skin is warm and dry.   Neurological:      General: No focal deficit present.      Mental Status: She is alert and oriented to person, place, and time.   Psychiatric:         Mood and Affect: Mood normal.         Behavior: Behavior normal.         Thought Content: Thought content normal.         Judgment: Judgment normal.       PHQ-9 Total Score:      Assessment/Plan   Diagnoses and all orders for this visit:    1. Nausea (Primary)  -     ondansetron (Zofran) 4 MG tablet; Take 1 tablet by mouth Every 8 (Eight) Hours As Needed for Nausea or Vomiting.  Dispense: 30 tablet; Refill: 1    2. Sinus arrhythmia  Comments:  referred to cardiology asap due to upcoming surgery  Orders:  -     Ambulatory Referral to Cardiology  -     Holter monitor - 24 hour; Future  -     ECG 12 Lead    3. Aneurysm of left internal carotid artery  Comments:  surgical repair scheduled next week        There are no Patient Instructions on file for this visit.

## 2021-08-11 NOTE — PROGRESS NOTES
Procedure     ECG 12 Lead  Performed by: Ankita Meadows APRN  Authorized by: Ankita Meadows APRN   Comparison: not compared with previous ECG   Previous ECG: no previous ECG available  Rhythm: sinus bradycardia and sinus arrhythmia  Rate: bradycardic  Conduction: conduction normal  ST Segments: ST segments normal  T Waves: T waves normal  QRS axis: normal    Clinical impression: abnormal EKG  Comments: Murmer heard at appt 2 weeks ago, few irregular beats noted today, no murmur appreciated today

## 2021-08-16 ENCOUNTER — HOSPITAL ENCOUNTER (OUTPATIENT)
Dept: CARDIOLOGY | Facility: HOSPITAL | Age: 41
Discharge: HOME | End: 2021-08-16

## 2021-08-16 ENCOUNTER — HOSPITAL ENCOUNTER (OUTPATIENT)
Dept: RESPIRATORY THERAPY | Facility: HOSPITAL | Age: 41
Discharge: HOME | End: 2021-08-16

## 2021-08-16 VITALS
BODY MASS INDEX: 32.79 KG/M2 | HEIGHT: 60 IN | WEIGHT: 167 LBS | SYSTOLIC BLOOD PRESSURE: 117 MMHG | DIASTOLIC BLOOD PRESSURE: 60 MMHG

## 2021-08-16 DIAGNOSIS — I49.8 SINUS ARRHYTHMIA: ICD-10-CM

## 2021-08-16 DIAGNOSIS — R01.1 MURMUR, CARDIAC: ICD-10-CM

## 2021-08-16 PROCEDURE — 93306 TTE W/DOPPLER COMPLETE: CPT | Performed by: INTERNAL MEDICINE

## 2021-08-16 PROCEDURE — 93225 XTRNL ECG REC<48 HRS REC: CPT

## 2021-08-16 PROCEDURE — 93306 TTE W/DOPPLER COMPLETE: CPT

## 2021-08-17 ENCOUNTER — OFFICE VISIT (OUTPATIENT)
Dept: CARDIOLOGY | Facility: CLINIC | Age: 41
End: 2021-08-17

## 2021-08-17 VITALS
HEIGHT: 60 IN | SYSTOLIC BLOOD PRESSURE: 124 MMHG | OXYGEN SATURATION: 98 % | BODY MASS INDEX: 33.57 KG/M2 | WEIGHT: 171 LBS | DIASTOLIC BLOOD PRESSURE: 80 MMHG | HEART RATE: 64 BPM

## 2021-08-17 DIAGNOSIS — R00.2 PALPITATIONS: Primary | ICD-10-CM

## 2021-08-17 LAB
BH CV ECHO MEAS - % IVS THICK: 49.7 %
BH CV ECHO MEAS - % LVPW THICK: 61.1 %
BH CV ECHO MEAS - ACS: 1.8 CM
BH CV ECHO MEAS - AO MAX PG (FULL): -0.3 MMHG
BH CV ECHO MEAS - AO MAX PG: 7.7 MMHG
BH CV ECHO MEAS - AO MEAN PG (FULL): 0.31 MMHG
BH CV ECHO MEAS - AO MEAN PG: 4.6 MMHG
BH CV ECHO MEAS - AO ROOT AREA (BSA CORRECTED): 1.6
BH CV ECHO MEAS - AO ROOT AREA: 5.9 CM^2
BH CV ECHO MEAS - AO ROOT DIAM: 2.7 CM
BH CV ECHO MEAS - AO V2 MAX: 138.7 CM/SEC
BH CV ECHO MEAS - AO V2 MEAN: 104.3 CM/SEC
BH CV ECHO MEAS - AO V2 VTI: 31.8 CM
BH CV ECHO MEAS - ASC AORTA: 2.7 CM
BH CV ECHO MEAS - AVA(I,A): 2.8 CM^2
BH CV ECHO MEAS - AVA(I,D): 2.8 CM^2
BH CV ECHO MEAS - AVA(V,A): 2.9 CM^2
BH CV ECHO MEAS - AVA(V,D): 2.9 CM^2
BH CV ECHO MEAS - BSA(HAYCOCK): 1.8 M^2
BH CV ECHO MEAS - BSA: 1.7 M^2
BH CV ECHO MEAS - BZI_BMI: 32.6 KILOGRAMS/M^2
BH CV ECHO MEAS - BZI_METRIC_HEIGHT: 152.4 CM
BH CV ECHO MEAS - BZI_METRIC_WEIGHT: 75.7 KG
BH CV ECHO MEAS - EDV(CUBED): 65.8 ML
BH CV ECHO MEAS - EDV(MOD-SP4): 72.7 ML
BH CV ECHO MEAS - EDV(TEICH): 71.6 ML
BH CV ECHO MEAS - EF(CUBED): 80.9 %
BH CV ECHO MEAS - EF(MOD-BP): 80 %
BH CV ECHO MEAS - EF(MOD-SP4): 79.9 %
BH CV ECHO MEAS - EF(TEICH): 73.9 %
BH CV ECHO MEAS - ESV(CUBED): 12.6 ML
BH CV ECHO MEAS - ESV(MOD-SP4): 14.6 ML
BH CV ECHO MEAS - ESV(TEICH): 18.6 ML
BH CV ECHO MEAS - FS: 42.4 %
BH CV ECHO MEAS - IVS/LVPW: 1.2
BH CV ECHO MEAS - IVSD: 0.96 CM
BH CV ECHO MEAS - IVSS: 1.4 CM
BH CV ECHO MEAS - LA DIMENSION(2D): 3.7 CM
BH CV ECHO MEAS - LV DIASTOLIC VOL/BSA (35-75): 42 ML/M^2
BH CV ECHO MEAS - LV MASS(C)D: 109.5 GRAMS
BH CV ECHO MEAS - LV MASS(C)DI: 63.4 GRAMS/M^2
BH CV ECHO MEAS - LV MASS(C)S: 99.4 GRAMS
BH CV ECHO MEAS - LV MASS(C)SI: 57.5 GRAMS/M^2
BH CV ECHO MEAS - LV MAX PG: 8 MMHG
BH CV ECHO MEAS - LV MEAN PG: 4.3 MMHG
BH CV ECHO MEAS - LV SYSTOLIC VOL/BSA (12-30): 8.4 ML/M^2
BH CV ECHO MEAS - LV V1 MAX: 141.4 CM/SEC
BH CV ECHO MEAS - LV V1 MEAN: 98.6 CM/SEC
BH CV ECHO MEAS - LV V1 VTI: 31.4 CM
BH CV ECHO MEAS - LVIDD: 4 CM
BH CV ECHO MEAS - LVIDS: 2.3 CM
BH CV ECHO MEAS - LVOT AREA: 2.9 CM^2
BH CV ECHO MEAS - LVOT DIAM: 1.9 CM
BH CV ECHO MEAS - LVPWD: 0.82 CM
BH CV ECHO MEAS - LVPWS: 1.3 CM
BH CV ECHO MEAS - MV A MAX VEL: 86.4 CM/SEC
BH CV ECHO MEAS - MV DEC SLOPE: 498.3 CM/SEC^2
BH CV ECHO MEAS - MV DEC TIME: 0.21 SEC
BH CV ECHO MEAS - MV E MAX VEL: 51.3 CM/SEC
BH CV ECHO MEAS - MV E/A: 0.59
BH CV ECHO MEAS - MV MAX PG: 5.2 MMHG
BH CV ECHO MEAS - MV MEAN PG: 2.1 MMHG
BH CV ECHO MEAS - MV V2 MAX: 114 CM/SEC
BH CV ECHO MEAS - MV V2 MEAN: 67.8 CM/SEC
BH CV ECHO MEAS - MV V2 VTI: 34.5 CM
BH CV ECHO MEAS - MVA(VTI): 2.6 CM^2
BH CV ECHO MEAS - PA ACC TIME: 0.07 SEC
BH CV ECHO MEAS - PA MAX PG (FULL): -0.55 MMHG
BH CV ECHO MEAS - PA MAX PG: 3.7 MMHG
BH CV ECHO MEAS - PA MEAN PG (FULL): -0.75 MMHG
BH CV ECHO MEAS - PA MEAN PG: 2.1 MMHG
BH CV ECHO MEAS - PA PR(ACCEL): 47.9 MMHG
BH CV ECHO MEAS - PA V2 MAX: 96.5 CM/SEC
BH CV ECHO MEAS - PA V2 MEAN: 69.8 CM/SEC
BH CV ECHO MEAS - PA V2 VTI: 17.9 CM
BH CV ECHO MEAS - PULM A REVS DUR: 0.09 SEC
BH CV ECHO MEAS - PULM A REVS VEL: 31.3 CM/SEC
BH CV ECHO MEAS - PULM DIAS VEL: 54.3 CM/SEC
BH CV ECHO MEAS - PULM S/D: 1.4
BH CV ECHO MEAS - PULM SYS VEL: 76.9 CM/SEC
BH CV ECHO MEAS - RV MAX PG: 4.3 MMHG
BH CV ECHO MEAS - RV MEAN PG: 2.8 MMHG
BH CV ECHO MEAS - RV V1 MAX: 103.4 CM/SEC
BH CV ECHO MEAS - RV V1 MEAN: 82 CM/SEC
BH CV ECHO MEAS - RV V1 VTI: 16.2 CM
BH CV ECHO MEAS - RVDD: 3.1 CM
BH CV ECHO MEAS - SI(AO): 109 ML/M^2
BH CV ECHO MEAS - SI(CUBED): 30.8 ML/M^2
BH CV ECHO MEAS - SI(LVOT): 51.8 ML/M^2
BH CV ECHO MEAS - SI(MOD-SP4): 33.6 ML/M^2
BH CV ECHO MEAS - SI(TEICH): 30.6 ML/M^2
BH CV ECHO MEAS - SV(AO): 188.5 ML
BH CV ECHO MEAS - SV(CUBED): 53.2 ML
BH CV ECHO MEAS - SV(LVOT): 89.5 ML
BH CV ECHO MEAS - SV(MOD-SP4): 58.1 ML
BH CV ECHO MEAS - SV(TEICH): 52.9 ML
LV EF 2D ECHO EST: 70 %
MAXIMAL PREDICTED HEART RATE: 178 BPM
STRESS TARGET HR: 151 BPM

## 2021-08-17 PROCEDURE — 99203 OFFICE O/P NEW LOW 30 MIN: CPT | Performed by: INTERNAL MEDICINE

## 2021-08-17 NOTE — PROGRESS NOTES
HP      Name: Ally Condon ADMIT: (Not on file)   : 1980  PCP: Erlinda Saleh MD    MRN: [id number] LOS: 0 days   AGE/SEX: 40 y.o. female  ROOM: Room/bed info not found     Chief Complaint   Patient presents with   • Consult     Sinus arrhythmia       Subjective         History of present illness  Ms. Ally Condon is a 40-year-old female patient with no prior history of cardiac problems, patient however has brain aneurysm and she had repair on the right side in  and she is scheduled to have repair on the left side on  due to increasing side.  Patient denies any active chest pains or shortness of breath she walks about 10 miles a day as part of her work and she does not get any exertional chest pains.  An EKG was done prior to this visit which showed sinus rhythm and an echo was done which also showed normal systolic function with no significant valvular pathology.  Patient does not have any lower extremity edema, no orthopnea and no exertional shortness of breath    Past Medical History:   Diagnosis Date   • Abnormal ECG 2021   • Aneurysm (CMS/HCC) 2011    Brain bilateral   • Aneurysm of left internal carotid artery 2012   • Heart murmur    • Hypertension    • Right internal carotid artery aneurysm 2014     Past Surgical History:   Procedure Laterality Date   • APPENDECTOMY     • ARTERIAL ANEURYSM REPAIR      Brain   • BRAIN SURGERY     •  SECTION      x2   • CHOLECYSTECTOMY     • SMALL INTESTINE SURGERY     • TONSILLECTOMY       Family History   Family history unknown: Yes     Social History     Tobacco Use   • Smoking status: Current Every Day Smoker     Packs/day: 1.00     Years: 20.00     Pack years: 20.00     Types: Cigarettes   • Smokeless tobacco: Never Used   Vaping Use   • Vaping Use: Never used   Substance Use Topics   • Alcohol use: No   • Drug use: No     (Not in a hospital admission)    Allergies:  Penicillins and Iodinated diagnostic  agents        Physical Exam  VITALS REVIEWED    General:      well developed, in no acute distress.    Head:      normocephalic and atraumatic.    Eyes:      PERRL/EOM intact, conjunctiva and sclera clear with out nystagmus.    Neck:      no masses, thyromegaly,  trachea central with normal respiratory effort and PMI displaced laterally  Lungs:      There are to auscultation bilaterally  Heart:       Regular rate and rhythm no murmur rubs or gallops  Msk:      no deformity or scoliosis noted of thoracic or lumbar spine.    Pulses:      pulses normal in all 4 extremities.    Extremities:       No lower extremity edema  Neurologic:      no focal deficits.   alert oriented x3  Skin:      intact without lesions or rashes.    Psych:      alert and cooperative; normal mood and affect; normal attention span and concentration.      Result Review :                Pertinent cardiac workup  EKG done on 8/11/2021 showed sinus rhythm with a heart rate of 53 beats a minute      Procedures        Assessment and Plan      Mrs. Condon is a [age]-year-old female patient with no prior cardiac history she does have brain aneurysm and she is scheduled to have surgery later this week.  Patient does not have any active anginal symptoms or CHF symptoms her EKG is unremarkable and her echocardiogram shows normal systolic function with no significant valvular pathology.  She does have a Holter monitor placed but no transmissions yet.  At this time I do not think that the patient is at high risk for perioperative cardiovascular complications and she is cleared from cardiac standpoint to proceed with brain aneurysm repair.  I will see the patient on as-needed basis unless the Holter monitor comes back abnormal in which case we will call the patient for an appointment.  Please do not hesitate to call me back for any questions regarding Ms. Condon's care.    There are no diagnoses linked to this encounter.       Return if symptoms worsen or fail  to improve.  Patient was given instructions and counseling regarding her condition or for health maintenance advice. Please see specific information pulled into the AVS if appropriate.    Cardiac silhouette is mildly enlarged. A right PIC catheter is present with the distal tip satisfactorily positioned in the superior vena cava. . The osseous structures and surrounding soft tissues demonstrate no acute abnormality. 1. Persistent opacity in the right lung base. This may be residual pulmonary edema, possibly atelectasis or an inflammatory process this is unchanged from October 24. Signed by Dr Rey Vigil on 10/25/2018 2:01 PM    Xr Chest Portable    Result Date: 10/24/2018  EXAMINATION: XR CHEST PORTABLE 10/24/2018 8:12 AM HISTORY: Pleural effusion, possible pneumonia COMPARISON: 10/23/2018 FINDINGS: The heart and mediastinal contours are stable. The aorta is tortuous with atherosclerotic calcifications. The pulmonary vasculature remains somewhat indistinct, though there has been improvement in the bilateral perihilar opacities. Airspace opacities persist at the lung bases, possibly atelectasis. There is no appreciable pneumothorax. Improving pulmonary edema and pulmonary vascular congestion. Bibasilar atelectasis. Signed by Dr Humphrey Carson on 10/24/2018 8:14 AM    Xr Chest Portable    Result Date: 10/23/2018  Examination. XR CHEST PORTABLE History: CVC insertion. A frontal portable upright view of the chest is compared with the previous study obtained earlier today. No interval change. There is persistent pulmonary vascular congestion and pulmonary edema. There are atelectatic changes in the right lower lung. There is moderate cardiomegaly. Atheromatous changes of thoracic aorta are noted. No pneumothorax. Postsurgical changes of the right shoulder are noted. No change. Persistent pulmonary vascular congestion and pulmonary edema. Discoid atelectasis right lower lung. No pneumothorax. The above finding are recorded on a digital voice clip in PACS.  Signed by Dr Kalyani Anderson on 10/23/2018 7:45 AM    Xr Chest Portable    Result Date: 10/23/2018  EXAMINATION: XR CHEST PORTABLE 10/23/2018 337   · Ferritin - >2000 on 10/24/2018  · Occult stools requested     GI consultation requested for concern for GI blood loss.     Acute kidney injury  · Renal ultrasound 10/23/2018- Mild atrophy of the left kidney with mild cortical thinning. No renal mass or hydronephrosis. · Creatinine 2.3, GFR 28 on 11/5/2018  · Initiation of hemodialysis on 10/24/2018, continuing MWF with nephrology managing        Acute liver failure - secondary to recent shock liver: IMPROVING  · Total bilirubin 2.8, down from 10.3 on 10/27/2018  · Alkaline phosphatase 287 on 11/5/2018  · , down from 1590  on 10/27/2018  · AST 63, down from 511 on 10/27/2018  · CT scan of abdomen and pelvis on 10/23/2018- small amount of abdominal ascites. Moderate thickening of the wall of the gallbladder with a fluid in the gallbladder fossa may represent acute cholecystitis. Liver and spleen appeared unremarkable.   · HIV non-reactive on 10/24/2018  · Hepatitis B non-reactive on 10/24/2018      ELISABETH Ramirez    11/06/18  7:03 AM

## 2021-08-18 ENCOUNTER — TELEPHONE (OUTPATIENT)
Dept: FAMILY MEDICINE CLINIC | Facility: CLINIC | Age: 41
End: 2021-08-18

## 2021-08-18 PROCEDURE — 93227 XTRNL ECG REC<48 HR R&I: CPT | Performed by: INTERNAL MEDICINE

## 2021-08-18 NOTE — TELEPHONE ENCOUNTER
HUB TO READ    ----- Message from Erlinda Saleh MD sent at 8/18/2021  5:15 PM EDT -----  Holter looked good-patient to see if cardiologist will give permission for surgery

## 2021-08-20 ENCOUNTER — PATIENT ROUNDING (BHMG ONLY) (OUTPATIENT)
Dept: CARDIOLOGY | Facility: CLINIC | Age: 41
End: 2021-08-20

## 2021-08-20 ENCOUNTER — TRANSITIONAL CARE MANAGEMENT TELEPHONE ENCOUNTER (OUTPATIENT)
Dept: FAMILY MEDICINE CLINIC | Facility: CLINIC | Age: 41
End: 2021-08-20

## 2021-08-20 ENCOUNTER — TELEPHONE (OUTPATIENT)
Dept: FAMILY MEDICINE CLINIC | Facility: CLINIC | Age: 41
End: 2021-08-20

## 2021-08-20 RX ORDER — NICOTINE 21 MG/24HR
1 PATCH, TRANSDERMAL 24 HOURS TRANSDERMAL EVERY 24 HOURS
Qty: 1 EACH | Refills: 2 | Status: SHIPPED | OUTPATIENT
Start: 2021-08-20 | End: 2022-07-25

## 2021-08-20 NOTE — TELEPHONE ENCOUNTER
Caller: Ally Condon    Relationship: Self    Best call back number: [phone] (H)    What medication are you requesting: NICODERM PATCHES 21 MG AND 2 MG GUM EVERY 2 HOURS    What are your current symptoms: PATIENT HAD SURGERY AND IS TRYING TO QUIT SMOKING    How long have you been experiencing symptoms: N/A    Have you had these symptoms before:    [x] Yes  [] No    Have you been treated for these symptoms before:   [x] Yes  [] No    If a prescription is needed, what is your preferred pharmacy and phone number:  [id number] - [place], [place] - [address] AT [address] & [address] [facility] - [phone]  - [phone]     Additional notes: PATIENT IS REQUESTING A PRESCRIPTION FOR THESE MEDICATIONS BE CALLED INTO PHARMACY ASAP, PATIENT JUST HAD SURGERY AND IS TRYING TO QUIT SMOKING AND NEEDS TO CONTINUE ON THESE MEDICATIONS, PLEASE ADVISE PATIENT WHEN SENT TO PHARMACY    PHARMACY IS CLOSED ON WEEKEND ACCORDING TO PATIENT

## 2021-08-20 NOTE — PROGRESS NOTES
August 20, 2021    Hello, may I speak with Ally Condon?    My name is Eva.       I am  with [facility]  [address] [place] [address].    Before we get started may I verify your date of birth? 1980    I am calling to officially welcome you to our practice and ask about your recent visit. Is this a good time to talk? yes    Tell me about your visit with us. What things went well?   was absolutely amazing, he has great bedside manner, he was relatable, and the office staff was great.        We're always looking for ways to make our patients' experiences even better. Do you have recommendations on ways we may improve?  Yes - poor lighting, the office is very dim and just seems really outdated.    Overall were you satisfied with your first visit to our practice? Yes        I appreciate you taking the time to speak with me today. Is there anything else I can do for you? No       Thank you, and have a great day.

## 2021-08-30 ENCOUNTER — OFFICE VISIT (OUTPATIENT)
Dept: FAMILY MEDICINE CLINIC | Facility: CLINIC | Age: 41
End: 2021-08-30

## 2021-08-30 VITALS
TEMPERATURE: 98.6 F | SYSTOLIC BLOOD PRESSURE: 115 MMHG | DIASTOLIC BLOOD PRESSURE: 79 MMHG | WEIGHT: 169 LBS | OXYGEN SATURATION: 96 % | HEART RATE: 70 BPM | HEIGHT: 60 IN | BODY MASS INDEX: 33.18 KG/M2

## 2021-08-30 DIAGNOSIS — K21.9 GASTROESOPHAGEAL REFLUX DISEASE WITHOUT ESOPHAGITIS: ICD-10-CM

## 2021-08-30 DIAGNOSIS — L50.9 URTICARIA: Primary | ICD-10-CM

## 2021-08-30 PROCEDURE — 99213 OFFICE O/P EST LOW 20 MIN: CPT | Performed by: NURSE PRACTITIONER

## 2021-08-30 RX ORDER — AMITRIPTYLINE HYDROCHLORIDE 10 MG/1
10 TABLET, FILM COATED ORAL DAILY
COMMUNITY
Start: 2021-08-23 | End: 2022-03-21 | Stop reason: SDUPTHER

## 2021-08-30 RX ORDER — METOCLOPRAMIDE 10 MG/1
10 TABLET ORAL
COMMUNITY
Start: 2021-08-23 | End: 2022-09-27

## 2021-08-30 RX ORDER — CLOPIDOGREL BISULFATE 75 MG/1
75 TABLET ORAL DAILY
Qty: 90 TABLET | Refills: 1 | Status: SHIPPED | OUTPATIENT
Start: 2021-08-30

## 2021-08-30 RX ORDER — PREDNISONE 10 MG/1
TABLET ORAL
Qty: 1 EACH | Refills: 0 | Status: SHIPPED | OUTPATIENT
Start: 2021-08-30 | End: 2022-01-06

## 2021-08-30 RX ORDER — HYDROXYZINE HYDROCHLORIDE 10 MG/1
10 TABLET, FILM COATED ORAL 3 TIMES DAILY PRN
Qty: 90 TABLET | Refills: 1 | Status: SHIPPED | OUTPATIENT
Start: 2021-08-30 | End: 2022-01-06 | Stop reason: SDUPTHER

## 2021-08-30 RX ORDER — CETIRIZINE HYDROCHLORIDE 10 MG/1
10 TABLET ORAL DAILY
Qty: 30 TABLET | Refills: 1 | Status: SHIPPED | OUTPATIENT
Start: 2021-08-30 | End: 2021-11-07

## 2021-08-30 RX ORDER — FAMOTIDINE 40 MG/1
40 TABLET, FILM COATED ORAL DAILY
Qty: 30 TABLET | Refills: 1 | Status: SHIPPED | OUTPATIENT
Start: 2021-08-30 | End: 2021-09-17

## 2021-08-30 NOTE — PROGRESS NOTES
"Subjective   Ally Condon is a 40 y.o. female presents for   Chief Complaint   Patient presents with   • Urticaria       Health Maintenance Due   Topic Date Due   • COVID-19 Vaccine (1) Never done   • TDAP/TD VACCINES (1 - Tdap) Never done       History of Present Illness   Pt present for concerns for hives.   She states she had a coiling procedure for brain aneurysm last week and broke out all over her body yesterday.  She states she same thing happened when she had the procedure performed in the past.  At that time, she was seen by an allergist, but doesn't remember a specific cause being found.  She took prednisone in the past.     Vitals:    08/30/21 1058   BP: 115/79   BP Location: Right arm   Patient Position: Sitting   Cuff Size: Adult   Pulse: 70   Temp: 98.6 °F (37 °C)   SpO2: 96%   Weight: 76.7 kg (169 lb)   Height: 152.4 cm (60\")     Body mass index is 33.01 kg/m².    Current Outpatient Medications on File Prior to Visit   Medication Sig Dispense Refill   • albuterol sulfate  (90 Base) MCG/ACT inhaler Inhale 2 puffs Every 4 (Four) Hours As Needed for Wheezing. 8 g 3   • amitriptyline (ELAVIL) 10 MG tablet Take 10 mg by mouth Daily.     • aspirin (ASPIRIN LOW STRENGTH) 81 MG chewable tablet Chew 81 mg Daily.     • atorvastatin (LIPITOR) 20 MG tablet Take 1 tablet by mouth Daily. 90 tablet 3   • clopidogrel (PLAVIX) 75 MG tablet TAKE 1 TABLET BY MOUTH DAILY 90 tablet 1   • fesoterodine fumarate (TOVIAZ ER) 4 MG tablet sustained-release 24 hour tablet Take 4 mg by mouth Daily.     • Melatonin 10 MG tablet Take 10 mg by mouth Every Night.     • metoclopramide (REGLAN) 10 MG tablet Take 10 mg by mouth.     • metoprolol tartrate (LOPRESSOR) 25 MG tablet TAKE 1 TABLET BY MOUTH TWICE DAILY 180 tablet 3   • nicotine (Nicoderm CQ) 21 MG/24HR patch Place 1 patch on the skin as directed by provider Daily. 1 each 2   • nicotine polacrilex (Nicorette) 2 MG gum Chew 1 each As Needed for Smoking Cessation. 150 " each 2   • ondansetron (Zofran) 4 MG tablet Take 1 tablet by mouth Every 8 (Eight) Hours As Needed for Nausea or Vomiting. 30 tablet 1   • benzonatate (TESSALON) 200 MG capsule Take 1 capsule by mouth 3 (Three) Times a Day As Needed for Cough. 30 capsule 0   • [DISCONTINUED] clopidogrel (PLAVIX) 75 MG tablet Take 1 tablet by mouth Daily. 90 tablet 1     No current facility-administered medications on file prior to visit.       The following portions of the patient's history were reviewed and updated as appropriate: allergies, current medications, past family history, past medical history, past social history, past surgical history, and problem list.    Review of Systems   Constitutional: Negative for chills and fever.   HENT: Negative for sinus pressure and sore throat.    Eyes: Negative for blurred vision.   Respiratory: Negative for cough and shortness of breath.    Cardiovascular: Negative for chest pain.   Gastrointestinal: Negative for abdominal pain.   Endocrine: Negative.    Genitourinary: Negative.    Musculoskeletal: Negative for arthralgias and joint swelling.   Skin: Positive for rash (hives). Negative for color change.   Allergic/Immunologic: Negative.    Neurological: Negative for dizziness.   Psychiatric/Behavioral: Negative for behavioral problems.       Objective   Physical Exam  Vitals and nursing note reviewed.   Constitutional:       Appearance: Normal appearance. She is well-developed.   HENT:      Head: Normocephalic and atraumatic.      Right Ear: External ear normal.      Left Ear: External ear normal.      Nose: Nose normal.   Eyes:      Extraocular Movements: Extraocular movements intact.      Pupils: Pupils are equal, round, and reactive to light.   Cardiovascular:      Rate and Rhythm: Normal rate and regular rhythm.      Heart sounds: Normal heart sounds.   Pulmonary:      Effort: Pulmonary effort is normal.      Breath sounds: Normal breath sounds.   Abdominal:      General: Bowel sounds  are normal.      Palpations: Abdomen is soft.   Genitourinary:     Vagina: Normal.   Musculoskeletal:         General: Normal range of motion.      Cervical back: Normal range of motion and neck supple.   Skin:     General: Skin is warm and dry.      Comments: Generalized hives   Neurological:      General: No focal deficit present.      Mental Status: She is alert and oriented to person, place, and time.   Psychiatric:         Mood and Affect: Mood normal.         Behavior: Behavior normal.         Judgment: Judgment normal.       PHQ-9 Total Score:      Assessment/Plan   Diagnoses and all orders for this visit:    1. Urticaria (Primary)  Comments:  pt instructed on use of antihistamines.  prior records from allergist requested to review and consider ongoing treatment/follow up  Orders:  -     predniSONE (DELTASONE) 10 MG (48) dose pack; Take as directed on pack  Dispense: 1 each; Refill: 0  -     hydrOXYzine (ATARAX) 10 MG tablet; Take 1 tablet by mouth 3 (Three) Times a Day As Needed for Itching.  Dispense: 90 tablet; Refill: 1  -     cetirizine (zyrTEC) 10 MG tablet; Take 1 tablet by mouth Daily.  Dispense: 30 tablet; Refill: 1    2. Gastroesophageal reflux disease without esophagitis  -     famotidine (Pepcid) 40 MG tablet; Take 1 tablet by mouth Daily for 30 days.  Dispense: 30 tablet; Refill: 1        There are no Patient Instructions on file for this visit.

## 2021-09-13 ENCOUNTER — OFFICE VISIT (OUTPATIENT)
Dept: FAMILY MEDICINE CLINIC | Facility: CLINIC | Age: 41
End: 2021-09-13

## 2021-09-13 VITALS
HEART RATE: 78 BPM | SYSTOLIC BLOOD PRESSURE: 120 MMHG | OXYGEN SATURATION: 97 % | BODY MASS INDEX: 34.71 KG/M2 | TEMPERATURE: 98.6 F | HEIGHT: 60 IN | DIASTOLIC BLOOD PRESSURE: 83 MMHG | WEIGHT: 176.8 LBS

## 2021-09-13 DIAGNOSIS — R07.9 CHEST PAIN, UNSPECIFIED TYPE: Primary | ICD-10-CM

## 2021-09-13 DIAGNOSIS — I67.1 ANEURYSM OF LEFT INTERNAL CAROTID ARTERY: ICD-10-CM

## 2021-09-13 PROCEDURE — 99214 OFFICE O/P EST MOD 30 MIN: CPT | Performed by: PREVENTIVE MEDICINE

## 2021-09-13 RX ORDER — TOLTERODINE 2 MG/1
2 CAPSULE, EXTENDED RELEASE ORAL 2 TIMES DAILY
COMMUNITY
Start: 2021-09-03

## 2021-09-14 NOTE — PROGRESS NOTES
"Subjective   Ally Condon is a 40 y.o. female presents for   Chief Complaint   Patient presents with   • Heartburn   • Neck Pain     right side of neck and lung pain   40-year-old white female who was recently off her Plavix and aspirin due to new coiling of her left cerebral area for aneurysm.  Since that time she has been experiencing some right anterior chest pain has had no leg swelling hemoptysis or extreme shortness of breath.  The discomfort in the right chest has not been accompanied with fever worsening with movement.  Patient has had no prior pulmonary embolism but is quite concerned that that is what is going on presently.  Chest discomfort could not be recreated with palpation or movement no wheezes rales rhonchi or rubs were heard With clear.  CT of the chest for pulmonary embolism will be ordered.  Should be noted that she has had no worsening of symptoms over the last week.    Health Maintenance Due   Topic Date Due   • COVID-19 Vaccine (1) Never done   • TDAP/TD VACCINES (1 - Tdap) Never done       History of Present Illness     Vitals:    09/13/21 1517 09/13/21 1522   BP: 121/72 120/83   BP Location: Left arm Right arm   Patient Position: Sitting Sitting   Cuff Size: Adult Adult   Pulse: 78    Temp: 98.6 °F (37 °C)    SpO2: 97%    Weight: 80.2 kg (176 lb 12.8 oz)    Height: 152.4 cm (60\")      Body mass index is 34.53 kg/m².    Current Outpatient Medications on File Prior to Visit   Medication Sig Dispense Refill   • albuterol sulfate  (90 Base) MCG/ACT inhaler Inhale 2 puffs Every 4 (Four) Hours As Needed for Wheezing. 8 g 3   • amitriptyline (ELAVIL) 10 MG tablet Take 10 mg by mouth Daily.     • aspirin (ASPIRIN LOW STRENGTH) 81 MG chewable tablet Chew 81 mg Daily.     • atorvastatin (LIPITOR) 20 MG tablet Take 1 tablet by mouth Daily. 90 tablet 3   • cetirizine (zyrTEC) 10 MG tablet Take 1 tablet by mouth Daily. 30 tablet 1   • clopidogrel (PLAVIX) 75 MG tablet TAKE 1 TABLET BY MOUTH DAILY " 90 tablet 1   • famotidine (Pepcid) 40 MG tablet Take 1 tablet by mouth Daily for 30 days. 30 tablet 1   • hydrOXYzine (ATARAX) 10 MG tablet Take 1 tablet by mouth 3 (Three) Times a Day As Needed for Itching. 90 tablet 1   • Melatonin 10 MG tablet Take 10 mg by mouth Every Night.     • metoclopramide (REGLAN) 10 MG tablet Take 10 mg by mouth.     • metoprolol tartrate (LOPRESSOR) 25 MG tablet TAKE 1 TABLET BY MOUTH TWICE DAILY 180 tablet 3   • nicotine (Nicoderm CQ) 21 MG/24HR patch Place 1 patch on the skin as directed by provider Daily. 1 each 2   • nicotine polacrilex (Nicorette) 2 MG gum Chew 1 each As Needed for Smoking Cessation. 150 each 2   • benzonatate (TESSALON) 200 MG capsule Take 1 capsule by mouth 3 (Three) Times a Day As Needed for Cough. 30 capsule 0   • fesoterodine fumarate (TOVIAZ ER) 4 MG tablet sustained-release 24 hour tablet Take 4 mg by mouth Daily.     • ondansetron (Zofran) 4 MG tablet Take 1 tablet by mouth Every 8 (Eight) Hours As Needed for Nausea or Vomiting. 30 tablet 1   • predniSONE (DELTASONE) 10 MG (48) dose pack Take as directed on pack 1 each 0   • tolterodine LA (DETROL LA) 2 MG 24 hr capsule Take 2 mg by mouth 2 (Two) Times a Day.       No current facility-administered medications on file prior to visit.       The following portions of the patient's history were reviewed and updated as appropriate: allergies, current medications, past family history, past medical history, past social history, past surgical history and problem list.    Review of Systems   Cardiovascular: Positive for chest pain.       Objective   Physical Exam  Vitals reviewed.   Constitutional:       General: She is not in acute distress.     Appearance: Normal appearance. She is well-developed. She is not ill-appearing or toxic-appearing.   HENT:      Head: Normocephalic and atraumatic.      Right Ear: Tympanic membrane, ear canal and external ear normal.      Left Ear: Tympanic membrane, ear canal and  external ear normal.      Nose: Nose normal.   Eyes:      Extraocular Movements: Extraocular movements intact.      Conjunctiva/sclera: Conjunctivae normal.      Pupils: Pupils are equal, round, and reactive to light.   Cardiovascular:      Rate and Rhythm: Normal rate and regular rhythm.      Heart sounds: Normal heart sounds.   Pulmonary:      Effort: Pulmonary effort is normal.      Breath sounds: Normal breath sounds.   Chest:      Chest wall: No tenderness.   Abdominal:      General: Bowel sounds are normal. There is no distension.      Palpations: Abdomen is soft. There is no mass.      Tenderness: There is no abdominal tenderness.   Musculoskeletal:         General: Normal range of motion.      Cervical back: Neck supple.   Skin:     General: Skin is warm.   Neurological:      General: No focal deficit present.      Mental Status: She is alert and oriented to person, place, and time.   Psychiatric:         Mood and Affect: Mood normal.         Behavior: Behavior normal.       PHQ-9 Total Score:      Assessment/Plan   Diagnoses and all orders for this visit:    1. Chest pain, unspecified type (Primary)  Comments:  Patient has noted intermittent slightly to the right of the sternum mid chest pain since recent aneurysm coiling.  No hemoptysis calf swelling increased shortne  Orders:  -     CT Chest Without Contrast; Future    2. Aneurysm of left internal carotid artery  Comments:  Recent stoppage of Plavix and aspirin for cerebral aneurysm coiling on the left side.        Patient Instructions   Staff to see if Radha test can be ordered for Titanium sensitivity

## 2021-09-17 ENCOUNTER — TELEPHONE (OUTPATIENT)
Dept: FAMILY MEDICINE CLINIC | Facility: CLINIC | Age: 41
End: 2021-09-17

## 2021-09-17 RX ORDER — SUCRALFATE 1 G/1
1 TABLET ORAL 4 TIMES DAILY
Qty: 120 TABLET | Refills: 1 | Status: SHIPPED | OUTPATIENT
Start: 2021-09-17 | End: 2022-07-25

## 2021-09-17 RX ORDER — OMEPRAZOLE 40 MG/1
40 CAPSULE, DELAYED RELEASE ORAL DAILY
Qty: 30 CAPSULE | Refills: 1 | Status: SHIPPED | OUTPATIENT
Start: 2021-09-17 | End: 2022-03-21 | Stop reason: DRUGHIGH

## 2021-09-17 NOTE — TELEPHONE ENCOUNTER
Patient states that she was expecting two new medications last week for heartburn and nothing was sent in for her. She did nt know that names.

## 2021-09-17 NOTE — TELEPHONE ENCOUNTER
Omeprazole once daily and if still heartburn take carafate and may need to take with applesauce or yogart.  Remember to take crafate one hour before or two hours after any other meds she takes.

## 2021-10-21 DIAGNOSIS — K21.9 GASTROESOPHAGEAL REFLUX DISEASE WITHOUT ESOPHAGITIS: ICD-10-CM

## 2021-10-21 RX ORDER — FAMOTIDINE 40 MG/1
TABLET, FILM COATED ORAL
Qty: 30 TABLET | Refills: 1 | OUTPATIENT
Start: 2021-10-21

## 2021-10-21 NOTE — TELEPHONE ENCOUNTER
Brief Note    Called by primary team that patient cannot afford Brilanta as he has no insurance but could afford plavix. Will ask primary team to stop brilanta, load with 300mg plavix and start 75mg plavix.    Ignacio MITCHELL   Stopped med as wasn't working

## 2021-11-07 DIAGNOSIS — L50.9 URTICARIA: ICD-10-CM

## 2021-11-07 RX ORDER — CETIRIZINE HYDROCHLORIDE 10 MG/1
TABLET ORAL
Qty: 30 TABLET | Refills: 1 | Status: SHIPPED | OUTPATIENT
Start: 2021-11-07 | End: 2022-01-06 | Stop reason: SDUPTHER

## 2022-01-06 ENCOUNTER — HOSPITAL ENCOUNTER (OUTPATIENT)
Dept: CARDIOLOGY | Facility: HOSPITAL | Age: 42
Discharge: HOME | End: 2022-01-06
Admitting: PREVENTIVE MEDICINE

## 2022-01-06 ENCOUNTER — OFFICE VISIT (OUTPATIENT)
Dept: ORTHOPEDIC SURGERY | Facility: CLINIC | Age: 42
End: 2022-01-06

## 2022-01-06 ENCOUNTER — OFFICE VISIT (OUTPATIENT)
Dept: FAMILY MEDICINE CLINIC | Facility: CLINIC | Age: 42
End: 2022-01-06

## 2022-01-06 VITALS
HEART RATE: 60 BPM | WEIGHT: 171 LBS | HEIGHT: 60 IN | TEMPERATURE: 97.7 F | BODY MASS INDEX: 33.57 KG/M2 | DIASTOLIC BLOOD PRESSURE: 62 MMHG | OXYGEN SATURATION: 96 % | SYSTOLIC BLOOD PRESSURE: 125 MMHG

## 2022-01-06 VITALS
WEIGHT: 171.2 LBS | HEART RATE: 69 BPM | BODY MASS INDEX: 33.61 KG/M2 | SYSTOLIC BLOOD PRESSURE: 137 MMHG | DIASTOLIC BLOOD PRESSURE: 74 MMHG | HEIGHT: 60 IN

## 2022-01-06 DIAGNOSIS — L50.9 URTICARIA: ICD-10-CM

## 2022-01-06 DIAGNOSIS — M79.89 SWELLING OF ARM: Primary | ICD-10-CM

## 2022-01-06 DIAGNOSIS — M79.89 SWELLING OF ARM: ICD-10-CM

## 2022-01-06 DIAGNOSIS — M79.631 RIGHT FOREARM PAIN: Primary | ICD-10-CM

## 2022-01-06 LAB
BH CV UPPER VENOUS LEFT INTERNAL JUGULAR AUGMENT: NORMAL
BH CV UPPER VENOUS LEFT INTERNAL JUGULAR COMPETENT: NORMAL
BH CV UPPER VENOUS LEFT INTERNAL JUGULAR COMPRESS: NORMAL
BH CV UPPER VENOUS LEFT INTERNAL JUGULAR PHASIC: NORMAL
BH CV UPPER VENOUS LEFT INTERNAL JUGULAR SPONT: NORMAL
BH CV UPPER VENOUS LEFT SUBCLAVIAN AUGMENT: NORMAL
BH CV UPPER VENOUS LEFT SUBCLAVIAN COMPETENT: NORMAL
BH CV UPPER VENOUS LEFT SUBCLAVIAN COMPRESS: NORMAL
BH CV UPPER VENOUS LEFT SUBCLAVIAN PHASIC: NORMAL
BH CV UPPER VENOUS LEFT SUBCLAVIAN SPONT: NORMAL
BH CV UPPER VENOUS RIGHT AXILLARY AUGMENT: NORMAL
BH CV UPPER VENOUS RIGHT AXILLARY COMPETENT: NORMAL
BH CV UPPER VENOUS RIGHT AXILLARY COMPRESS: NORMAL
BH CV UPPER VENOUS RIGHT AXILLARY PHASIC: NORMAL
BH CV UPPER VENOUS RIGHT AXILLARY SPONT: NORMAL
BH CV UPPER VENOUS RIGHT BASILIC FOREARM COMPRESS: NORMAL
BH CV UPPER VENOUS RIGHT BASILIC UPPER COMPRESS: NORMAL
BH CV UPPER VENOUS RIGHT BRACHIAL COMPRESS: NORMAL
BH CV UPPER VENOUS RIGHT CEPHALIC FOREARM COMPRESS: NORMAL
BH CV UPPER VENOUS RIGHT CEPHALIC UPPER COMPRESS: NORMAL
BH CV UPPER VENOUS RIGHT INTERNAL JUGULAR AUGMENT: NORMAL
BH CV UPPER VENOUS RIGHT INTERNAL JUGULAR COMPETENT: NORMAL
BH CV UPPER VENOUS RIGHT INTERNAL JUGULAR COMPRESS: NORMAL
BH CV UPPER VENOUS RIGHT INTERNAL JUGULAR PHASIC: NORMAL
BH CV UPPER VENOUS RIGHT INTERNAL JUGULAR SPONT: NORMAL
BH CV UPPER VENOUS RIGHT RADIAL COMPRESS: NORMAL
BH CV UPPER VENOUS RIGHT SUBCLAVIAN AUGMENT: NORMAL
BH CV UPPER VENOUS RIGHT SUBCLAVIAN COMPETENT: NORMAL
BH CV UPPER VENOUS RIGHT SUBCLAVIAN COMPRESS: NORMAL
BH CV UPPER VENOUS RIGHT SUBCLAVIAN PHASIC: NORMAL
BH CV UPPER VENOUS RIGHT SUBCLAVIAN SPONT: NORMAL
BH CV UPPER VENOUS RIGHT ULNAR COMPRESS: NORMAL
MAXIMAL PREDICTED HEART RATE: 177 BPM
STRESS TARGET HR: 150 BPM

## 2022-01-06 PROCEDURE — 99214 OFFICE O/P EST MOD 30 MIN: CPT | Performed by: PREVENTIVE MEDICINE

## 2022-01-06 PROCEDURE — 99203 OFFICE O/P NEW LOW 30 MIN: CPT | Performed by: ORTHOPAEDIC SURGERY

## 2022-01-06 PROCEDURE — 93971 EXTREMITY STUDY: CPT

## 2022-01-06 RX ORDER — CETIRIZINE HYDROCHLORIDE 10 MG/1
10 TABLET ORAL DAILY
Qty: 30 TABLET | Refills: 1 | Status: SHIPPED | OUTPATIENT
Start: 2022-01-06 | End: 2022-07-25 | Stop reason: SDUPTHER

## 2022-01-06 RX ORDER — ALBUTEROL SULFATE 90 UG/1
2 AEROSOL, METERED RESPIRATORY (INHALATION) EVERY 4 HOURS PRN
Qty: 8 G | Refills: 3 | Status: SHIPPED | OUTPATIENT
Start: 2022-01-06 | End: 2023-02-07

## 2022-01-06 RX ORDER — HYDROXYZINE HYDROCHLORIDE 10 MG/1
10 TABLET, FILM COATED ORAL 3 TIMES DAILY PRN
Qty: 90 TABLET | Refills: 1 | Status: SHIPPED | OUTPATIENT
Start: 2022-01-06 | End: 2022-05-12

## 2022-01-06 NOTE — PATIENT INSTRUCTIONS
Health Maintenance Due   Topic Date Due   • COVID-19 Vaccine (1) Never done   • TDAP/TD VACCINES (1 - Tdap) Never done   • INFLUENZA VACCINE  08/01/2021

## 2022-01-06 NOTE — PROGRESS NOTES
"     Patient ID: Ally Condon is a 41 y.o. female.    Chief Complaint:    Chief Complaint   Patient presents with   • Right Forearm - Initial Evaluation     X 1 day, hit forearm bruising with Pain and swelling       HPI:  Ally is a 41-year-old female here in consultation from Dr. Saleh  with 1 day of right arm pain.  She takes Plavix and aspirin and hit her arm at work and developed some swelling to the right arm.  Pain is mild but actually better overnight swelling is stable  Past Medical History:   Diagnosis Date   • Abnormal ECG 2021   • Aneurysm (HCC) 2011    Brain bilateral   • Aneurysm of left internal carotid artery 2012   • Heart murmur    • Hypertension    • Right internal carotid artery aneurysm 2014   • Unilateral emphysema (HCC)        Past Surgical History:   Procedure Laterality Date   • APPENDECTOMY     • ARTERIAL ANEURYSM REPAIR      Brain   • BRAIN SURGERY     •  SECTION      x2   • CHOLECYSTECTOMY     • SMALL INTESTINE SURGERY     • TONSILLECTOMY         Family History   Family history unknown: Yes          Social History     Occupational History   • Not on file   Tobacco Use   • Smoking status: Current Every Day Smoker     Packs/day: 1.00     Years: 20.00     Pack years: 20.00     Types: Cigarettes   • Smokeless tobacco: Never Used   Vaping Use   • Vaping Use: Never used   Substance and Sexual Activity   • Alcohol use: No   • Drug use: No   • Sexual activity: Yes     Partners: Male     Birth control/protection: None      Review of Systems   Cardiovascular: Negative for chest pain.   Musculoskeletal: Positive for arthralgias.       Objective:    /74 (BP Location: Left arm, Patient Position: Sitting, Cuff Size: Large Adult)   Pulse 69   Ht 152.4 cm (60\")   Wt 77.7 kg (171 lb 3.2 oz)   BMI 33.44 kg/m²     Physical Examination:  Right arm demonstrates a pre-existing birthmark, there are 2-3 areas of bruising over the central aspect of the ulnar aspect of the " forearm.  There is mild to moderate swelling but it is nontense.  Compartments are all compressible.  She has no pain on elbow range of motion as well as passive stretch of the wrist and digits.  She has 80 degrees of pronation supination with little to no pain.  Sensory and motor exam are intact all distributions. Radial pulse is palpable and capillary refill is less than two seconds to all digits.    Imaging:  right Forearm X-Ray  Indication: Right arm pain contusion  AP and Lateral views  Findings: Soft tissue swelling but no fracture  no bony lesion  Soft tissues normal  not examined joint spaces  Hardware appropriately positioned not applicable      no prior studies available for comparison.    Doppler report demonstrates no clot of the right upper extremity    Assessment:  Right arm contusion    Plan:  Examination not consistent with compartment syndrome.  Recommend a wrap which is applied today as well as ice elevation activity modification.  She likely needs to continue her Plavix as she has a history of brain aneurysms.  If symptoms worsen gave her the signs and symptoms of compartment syndrome and can see me as needed      Procedures         Disclaimer: Part of this note may be an electronic transcription/translation of spoken language to printed text using the Dragon Dictation System

## 2022-01-06 NOTE — PROGRESS NOTES
"Subjective   Ally Condon is a 41 y.o. female presents for   Chief Complaint   Patient presents with   • Arm Pain     right    • Hand Pain     right swollen     Right-handed white female was using her right upper extremity to push around her work area doing things that were not actually part of her job yesterday when she got home last evening she developed some pain and swelling distal to the right elbow and fingers are tight and the forearm is aching.  Patient is presently on Plavix and aspirin for cerebral aneurysm.  Concern is that she may be developing a compartment syndrome in the right forearm.  Adson test was negative decreased sensation in the fingers due to swelling.  Multiple bruises on the dorsum and lateral aspect of the forearm.   is weak unable to fully move the right elbow.  No axillary adenopathy or upper right arm discomfort.  Stat venous Doppler showed no venous blood clotting.  She was seen by Dr. Handley who was not impressed by the findings and placed her in an Ace wrap.  We have advised that if she gets more swelling or pain that she should go to the [facility] emergency room and the hand surgeon resident on call will check her for compartment syndrome.  She is allowed to be off work today.  Discussion through text with orthopedist and referral to [facility] may per this examiner.  Patient was seen after she returned from the orthopedist office arm is still quite swollen and painful.  Advised to go to [facility] ER.  Health Maintenance Due   Topic Date Due   • COVID-19 Vaccine (1) Never done   • TDAP/TD VACCINES (1 - Tdap) Never done   • INFLUENZA VACCINE  08/01/2021       History of Present Illness     Vitals:    01/06/22 1045   BP: 125/62   BP Location: Left arm   Patient Position: Sitting   Cuff Size: Adult   Pulse: 60   Temp: 97.7 °F (36.5 °C)   TempSrc: Temporal   SpO2: 96%   Weight: 77.6 kg (171 lb)   Height: 152.4 cm (60\")     Body mass index is 33.4 kg/m².    Current Outpatient " Medications on File Prior to Visit   Medication Sig Dispense Refill   • aspirin (ASPIRIN LOW STRENGTH) 81 MG chewable tablet Chew 81 mg Daily.     • atorvastatin (LIPITOR) 20 MG tablet Take 1 tablet by mouth Daily. 90 tablet 3   • clopidogrel (PLAVIX) 75 MG tablet TAKE 1 TABLET BY MOUTH DAILY 90 tablet 1   • Melatonin 10 MG tablet Take 10 mg by mouth Every Night.     • metoprolol tartrate (LOPRESSOR) 25 MG tablet TAKE 1 TABLET BY MOUTH TWICE DAILY 180 tablet 3   • omeprazole (priLOSEC) 40 MG capsule Take 1 capsule by mouth Daily. 30 capsule 1   • tolterodine LA (DETROL LA) 2 MG 24 hr capsule Take 2 mg by mouth 2 (Two) Times a Day.     • [DISCONTINUED] albuterol sulfate  (90 Base) MCG/ACT inhaler Inhale 2 puffs Every 4 (Four) Hours As Needed for Wheezing. 8 g 3   • [DISCONTINUED] cetirizine (zyrTEC) 10 MG tablet TAKE 1 TABLET BY MOUTH EVERY DAY 30 tablet 1   • amitriptyline (ELAVIL) 10 MG tablet Take 10 mg by mouth Daily.     • metoclopramide (REGLAN) 10 MG tablet Take 10 mg by mouth.     • nicotine (Nicoderm CQ) 21 MG/24HR patch Place 1 patch on the skin as directed by provider Daily. 1 each 2   • nicotine polacrilex (Nicorette) 2 MG gum Chew 1 each As Needed for Smoking Cessation. 150 each 2   • sucralfate (Carafate) 1 g tablet Take 1 tablet by mouth 4 (Four) Times a Day. 120 tablet 1   • [DISCONTINUED] benzonatate (TESSALON) 200 MG capsule Take 1 capsule by mouth 3 (Three) Times a Day As Needed for Cough. 30 capsule 0   • [DISCONTINUED] fesoterodine fumarate (TOVIAZ ER) 4 MG tablet sustained-release 24 hour tablet Take 4 mg by mouth Daily.     • [DISCONTINUED] hydrOXYzine (ATARAX) 10 MG tablet Take 1 tablet by mouth 3 (Three) Times a Day As Needed for Itching. 90 tablet 1   • [DISCONTINUED] ondansetron (Zofran) 4 MG tablet Take 1 tablet by mouth Every 8 (Eight) Hours As Needed for Nausea or Vomiting. 30 tablet 1   • [DISCONTINUED] predniSONE (DELTASONE) 10 MG (48) dose pack Take as directed on pack 1 each 0      No current facility-administered medications on file prior to visit.       The following portions of the patient's history were reviewed and updated as appropriate: allergies, current medications, past family history, past medical history, past social history, past surgical history and problem list.    Review of Systems   Musculoskeletal: Positive for arthralgias, joint swelling and myalgias.   Neurological: Positive for numbness.       Objective   Physical Exam  Musculoskeletal:         General: Swelling, tenderness, deformity and signs of injury present.      Comments: Stevo's test was negative for radial or ulnar decreased blood supply.  Patient did not have full range of motion of the right elbow and pain with right wrist movement soft touch sensation to the fingers is all decreased.  No axillary adenopathy moderate right forearm swelling with ecchymosis and bruising.       PHQ-9 Total Score:      Assessment/Plan   Diagnoses and all orders for this visit:    1. Swelling of arm (Primary)  Comments:  While at work yesterday she physically used her right arm more than usual and has banged it a time or 2 doing work duties that were not actually part of her job  Orders:  -     Duplex Venous Upper Extremity - Right CAR; Future  -     Cancel: Ambulatory Referral to Orthopedic Surgery        Patient Instructions     Health Maintenance Due   Topic Date Due   • COVID-19 Vaccine (1) Never done   • TDAP/TD VACCINES (1 - Tdap) Never done   • INFLUENZA VACCINE  08/01/2021

## 2022-01-07 ENCOUNTER — PATIENT ROUNDING (BHMG ONLY) (OUTPATIENT)
Dept: ORTHOPEDIC SURGERY | Facility: CLINIC | Age: 42
End: 2022-01-07

## 2022-01-07 NOTE — PROGRESS NOTES
A My-Chart message has been sent to the patient for PATIENT ROUNDING with [facility]

## 2022-01-17 ENCOUNTER — TELEPHONE (OUTPATIENT)
Dept: FAMILY MEDICINE CLINIC | Facility: CLINIC | Age: 42
End: 2022-01-17

## 2022-02-13 RX ORDER — ATORVASTATIN CALCIUM 20 MG/1
20 TABLET, FILM COATED ORAL DAILY
Qty: 90 TABLET | Refills: 3 | Status: SHIPPED | OUTPATIENT
Start: 2022-02-13 | End: 2022-11-06

## 2022-03-14 ENCOUNTER — CLINICAL SUPPORT (OUTPATIENT)
Dept: FAMILY MEDICINE CLINIC | Facility: CLINIC | Age: 42
End: 2022-03-14

## 2022-03-14 DIAGNOSIS — Z01.818 PRE-OP TESTING: Primary | ICD-10-CM

## 2022-03-14 PROCEDURE — U0004 COV-19 TEST NON-CDC HGH THRU: HCPCS | Performed by: PREVENTIVE MEDICINE

## 2022-03-14 PROCEDURE — 99211 OFF/OP EST MAY X REQ PHY/QHP: CPT | Performed by: PREVENTIVE MEDICINE

## 2022-03-15 ENCOUNTER — TELEPHONE (OUTPATIENT)
Dept: FAMILY MEDICINE CLINIC | Facility: CLINIC | Age: 42
End: 2022-03-15

## 2022-03-15 LAB — SARS-COV-2 ORF1AB RESP QL NAA+PROBE: NOT DETECTED

## 2022-03-15 NOTE — TELEPHONE ENCOUNTER
HUB TO READ  ----- Message from Erlinda Saleh MD sent at 3/15/2022  7:14 AM EDT -----  Covid test was negative to we need to send this result somewhere?

## 2022-03-21 ENCOUNTER — OFFICE VISIT (OUTPATIENT)
Dept: FAMILY MEDICINE CLINIC | Facility: CLINIC | Age: 42
End: 2022-03-21

## 2022-03-21 VITALS
OXYGEN SATURATION: 98 % | WEIGHT: 169.2 LBS | DIASTOLIC BLOOD PRESSURE: 81 MMHG | HEART RATE: 70 BPM | HEIGHT: 60 IN | TEMPERATURE: 97.7 F | SYSTOLIC BLOOD PRESSURE: 127 MMHG | BODY MASS INDEX: 33.22 KG/M2

## 2022-03-21 DIAGNOSIS — E78.49 OTHER HYPERLIPIDEMIA: ICD-10-CM

## 2022-03-21 DIAGNOSIS — M25.521 RIGHT ELBOW PAIN: ICD-10-CM

## 2022-03-21 DIAGNOSIS — J43.0 UNILATERAL EMPHYSEMA: ICD-10-CM

## 2022-03-21 DIAGNOSIS — G43.109 MIGRAINE WITH AURA AND WITHOUT STATUS MIGRAINOSUS, NOT INTRACTABLE: ICD-10-CM

## 2022-03-21 DIAGNOSIS — Z00.01 ENCOUNTER FOR ANNUAL GENERAL MEDICAL EXAMINATION WITH ABNORMAL FINDINGS IN ADULT: Primary | ICD-10-CM

## 2022-03-21 DIAGNOSIS — I67.1 ANEURYSM OF LEFT INTERNAL CAROTID ARTERY: ICD-10-CM

## 2022-03-21 DIAGNOSIS — E55.9 VITAMIN D DEFICIENCY: ICD-10-CM

## 2022-03-21 DIAGNOSIS — Z72.0 TOBACCO ABUSE: ICD-10-CM

## 2022-03-21 DIAGNOSIS — E66.09 CLASS 1 OBESITY DUE TO EXCESS CALORIES WITH SERIOUS COMORBIDITY AND BODY MASS INDEX (BMI) OF 33.0 TO 33.9 IN ADULT: ICD-10-CM

## 2022-03-21 DIAGNOSIS — F41.8 MIXED ANXIETY DEPRESSIVE DISORDER: ICD-10-CM

## 2022-03-21 DIAGNOSIS — I10 PRIMARY HYPERTENSION: ICD-10-CM

## 2022-03-21 DIAGNOSIS — I67.1 CEREBRAL ANEURYSM: ICD-10-CM

## 2022-03-21 DIAGNOSIS — R01.1 HEART MURMUR: ICD-10-CM

## 2022-03-21 PROCEDURE — 99396 PREV VISIT EST AGE 40-64: CPT | Performed by: PREVENTIVE MEDICINE

## 2022-03-21 PROCEDURE — 99214 OFFICE O/P EST MOD 30 MIN: CPT | Performed by: PREVENTIVE MEDICINE

## 2022-03-21 RX ORDER — FAMOTIDINE 10 MG/1
10 TABLET ORAL DAILY
COMMUNITY
Start: 2022-02-02 | End: 2022-07-25

## 2022-03-21 RX ORDER — ADAPALENE 3 MG/G
GEL TOPICAL
COMMUNITY
Start: 2022-02-08

## 2022-03-21 RX ORDER — HYDROXYZINE HYDROCHLORIDE 25 MG/1
TABLET, FILM COATED ORAL
COMMUNITY
Start: 2022-03-18 | End: 2022-03-21 | Stop reason: DRUGHIGH

## 2022-03-21 RX ORDER — TRETINOIN 1 MG/G
CREAM TOPICAL
COMMUNITY
Start: 2022-01-24

## 2022-03-21 RX ORDER — AMITRIPTYLINE HYDROCHLORIDE 10 MG/1
10 TABLET, FILM COATED ORAL DAILY
Qty: 30 TABLET | Refills: 1 | Status: SHIPPED | OUTPATIENT
Start: 2022-03-21 | End: 2022-05-17 | Stop reason: SDUPTHER

## 2022-03-21 NOTE — PATIENT INSTRUCTIONS
Health Maintenance Due   Topic Date Due    COVID-19 Vaccine (1) Never done    TDAP/TD VACCINES (1 - Tdap) Never done    INFLUENZA VACCINE  08/01/2021    ANNUAL PHYSICAL  01/29/2022    Wear R wrist splint to bed. Ice elbow 4X/day 10 minutes.  Palm up lifting. 12 hour fast for labs

## 2022-03-21 NOTE — PROGRESS NOTES
"Subjective   Ally Condon is a 41 y.o. female presents for   Chief Complaint   Patient presents with   • Follow-up     FMLA paperwork; wants blood test, wants referral to mental health   Patient presents today for follow-up of multiple chronic health conditions but also for her annual wellness exam.  She has been advised to wear sunscreen and seatbelt.  She is quite disturbed due to the fact that the neurosurgeon felt as though she should consult the mental health professional.  She states that she seems to be having some problems with anxiety and depression due to the fact that she is getting very little or no sleep and that she is now working the night shift and still has home daytime responsibilities we have restarted her amitriptyline she does not feel homicidal or suicidal and have referred her to behavioral health.  She also is having persistent right elbow pain and will wear a right wrist splint at night I feel as though it probably lateral epicondylitis and she will continue to ice the elbow do palm up lifting and call Easter time if her symptoms persist and we will consider an injection.  She has been encouraged to stop smoking she is not yet ready to completely stop that    Health Maintenance Due   Topic Date Due   • COVID-19 Vaccine (1) Never done   • TDAP/TD VACCINES (1 - Tdap) Never done   • INFLUENZA VACCINE  08/01/2021   • ANNUAL PHYSICAL  01/29/2022   • LIPID PANEL  03/21/2022       History of Present Illness     Vitals:    03/21/22 1413   BP: 127/81   BP Location: Left arm   Patient Position: Sitting   Cuff Size: Large Adult   Pulse: 70   Temp: 97.7 °F (36.5 °C)   TempSrc: Temporal   SpO2: 98%   Weight: 76.7 kg (169 lb 3.2 oz)   Height: 152.4 cm (60\")     Body mass index is 33.04 kg/m².    Current Outpatient Medications on File Prior to Visit   Medication Sig Dispense Refill   • Acetaminophen (Tylenol) 325 MG capsule Take  by mouth.     • adapalene (DIFFERIN) 0.3 % gel APPLY TOPICALLY TO THE " AFFECTED AREA EVERY MORNING     • albuterol sulfate  (90 Base) MCG/ACT inhaler Inhale 2 puffs Every 4 (Four) Hours As Needed for Wheezing. 8 g 3   • aspirin 81 MG chewable tablet Chew 81 mg Daily.     • atorvastatin (LIPITOR) 20 MG tablet TAKE 1 TABLET BY MOUTH DAILY 90 tablet 3   • cetirizine (zyrTEC) 10 MG tablet Take 1 tablet by mouth Daily. 30 tablet 1   • clopidogrel (PLAVIX) 75 MG tablet TAKE 1 TABLET BY MOUTH DAILY 90 tablet 1   • Heartburn Relief 10 MG tablet Take 10 mg by mouth Daily.     • hydrOXYzine (ATARAX) 10 MG tablet Take 1 tablet by mouth 3 (Three) Times a Day As Needed for Itching. 90 tablet 1   • Melatonin 10 MG tablet Take 10 mg by mouth Every Night.     • metoprolol tartrate (LOPRESSOR) 25 MG tablet TAKE 1 TABLET BY MOUTH TWICE DAILY 180 tablet 3   • tolterodine LA (DETROL LA) 2 MG 24 hr capsule Take 2 mg by mouth 2 (Two) Times a Day.     • tretinoin (RETIN-A) 0.1 % cream APPLY PEA SIZED AMOUNT TOPICALLY TO THE AFFECTED AREA AT BEDTIME     • amitriptyline (ELAVIL) 10 MG tablet Take 10 mg by mouth Daily.     • metoclopramide (REGLAN) 10 MG tablet Take 10 mg by mouth.     • nicotine (Nicoderm CQ) 21 MG/24HR patch Place 1 patch on the skin as directed by provider Daily. 1 each 2   • nicotine polacrilex (Nicorette) 2 MG gum Chew 1 each As Needed for Smoking Cessation. 150 each 2   • sucralfate (Carafate) 1 g tablet Take 1 tablet by mouth 4 (Four) Times a Day. 120 tablet 1   • [DISCONTINUED] hydrOXYzine (ATARAX) 25 MG tablet      • [DISCONTINUED] omeprazole (priLOSEC) 40 MG capsule Take 1 capsule by mouth Daily. 30 capsule 1     No current facility-administered medications on file prior to visit.       The following portions of the patient's history were reviewed and updated as appropriate: allergies, current medications, past family history, past medical history, past social history, past surgical history and problem list.    Review of Systems   Constitutional: Positive for fatigue.    Neurological: Positive for headache.   Psychiatric/Behavioral: Positive for sleep disturbance, depressed mood and stress.       Objective   Physical Exam  Vitals reviewed.   Constitutional:       General: She is not in acute distress.     Appearance: She is well-developed. She is obese. She is not ill-appearing or toxic-appearing.   HENT:      Head: Normocephalic and atraumatic.      Right Ear: Tympanic membrane, ear canal and external ear normal.      Left Ear: Tympanic membrane, ear canal and external ear normal.      Nose: Nose normal.   Eyes:      Extraocular Movements: Extraocular movements intact.      Conjunctiva/sclera: Conjunctivae normal.      Pupils: Pupils are equal, round, and reactive to light.   Cardiovascular:      Rate and Rhythm: Normal rate and regular rhythm.      Heart sounds: Normal heart sounds.   Pulmonary:      Effort: Pulmonary effort is normal.      Comments: Decreased breath sounds bilaterally  Abdominal:      General: Bowel sounds are normal. There is no distension.      Palpations: Abdomen is soft. There is no mass.      Tenderness: There is no abdominal tenderness.   Musculoskeletal:         General: Tenderness present.      Cervical back: Neck supple.   Feet:      Comments:     Skin:     General: Skin is warm.   Neurological:      General: No focal deficit present.      Mental Status: She is alert and oriented to person, place, and time.   Psychiatric:         Mood and Affect: Mood normal.         Behavior: Behavior normal.       PHQ-9 Total Score: 0    Assessment/Plan   Diagnoses and all orders for this visit:    1. Encounter for annual general medical examination with abnormal findings in adult (Primary)  Comments:  Patient has been advised to wear sunscreen and seatbelt    2. Cerebral aneurysm  Comments:  Controlled    3. Aneurysm of left internal carotid artery  Comments:  Controlled    4. Heart murmur  Comments:  Echo showed no valve issues    5. Primary  hypertension  Comments:  Controlled    6. Migraine with aura and without status migrainosus, not intractable  Comments:  Restart Amytriptyline    7. Mixed anxiety depressive disorder  Comments:  Have referred to behavioral health.  No HI or SI.    8. Tobacco abuse  Comments:  Cut back to less than 1/2 pack with night shift    9. Unilateral emphysema (HCC)  Comments:  No increase in breathing problem    10. Vitamin D deficiency  Comments:  Takes vitamin D regularly    11. Other hyperlipidemia  Comments:  Trying to eat less saturated fats    12. Right elbow pain  Comments:  Will wear at night wrist-hand to palm up lifting and ice the right elbow call back in a few weeks if symptoms persist    13. Class 1 obesity due to excess calories with serious comorbidity and body mass index (BMI) of 33.0 to 33.9 in adult        Patient Instructions     Health Maintenance Due   Topic Date Due   • COVID-19 Vaccine (1) Never done   • TDAP/TD VACCINES (1 - Tdap) Never done   • INFLUENZA VACCINE  08/01/2021   • ANNUAL PHYSICAL  01/29/2022    Wear R wrist splint to bed. Ice elbow 4X/day 10 minutes.  Palm up lifting. 12 hour fast for labs

## 2022-03-25 ENCOUNTER — TELEPHONE (OUTPATIENT)
Dept: FAMILY MEDICINE CLINIC | Facility: CLINIC | Age: 42
End: 2022-03-25

## 2022-03-25 NOTE — TELEPHONE ENCOUNTER
I spoke with the patientS  about this. GAVE PHONE NUMBER TO GIVE TO WIFE AND WAS TOLD IT WAS IN REVIEW WITH THEIR OFFICE

## 2022-03-25 NOTE — TELEPHONE ENCOUNTER
PATIENT'S  (ON [facility]) IS CALLING TO GET STATUS OF PATIENT'S MENTAL HEALTH REFERRAL. STATES THEY HAVE NOT HEARD ANYTHING. PATIENT'S  STATES THIS IS A PRIORITY FOR HIMSELF AND THE PATIENT. ASKED THAT THIS BE MARKED HIGH PRIORITY. PLEASE ADVISE: [phone]

## 2022-03-30 ENCOUNTER — CLINICAL SUPPORT (OUTPATIENT)
Dept: FAMILY MEDICINE CLINIC | Facility: CLINIC | Age: 42
End: 2022-03-30

## 2022-03-30 DIAGNOSIS — E78.49 OTHER HYPERLIPIDEMIA: ICD-10-CM

## 2022-03-30 PROCEDURE — 80050 GENERAL HEALTH PANEL: CPT | Performed by: PREVENTIVE MEDICINE

## 2022-03-30 PROCEDURE — 83735 ASSAY OF MAGNESIUM: CPT | Performed by: PREVENTIVE MEDICINE

## 2022-03-30 PROCEDURE — 80061 LIPID PANEL: CPT | Performed by: PREVENTIVE MEDICINE

## 2022-03-30 PROCEDURE — 36415 COLL VENOUS BLD VENIPUNCTURE: CPT | Performed by: NURSE PRACTITIONER

## 2022-03-30 PROCEDURE — 82607 VITAMIN B-12: CPT | Performed by: PREVENTIVE MEDICINE

## 2022-03-30 PROCEDURE — 82306 VITAMIN D 25 HYDROXY: CPT | Performed by: PREVENTIVE MEDICINE

## 2022-03-30 NOTE — PROGRESS NOTES
Venipuncture Blood Specimen Collection  Venipuncture performed in left hand by Francisco Michaels MA with good hemostasis. Patient tolerated the procedure well without complications.   03/30/2022  Francisco Michaels MA

## 2022-03-31 ENCOUNTER — TELEPHONE (OUTPATIENT)
Dept: FAMILY MEDICINE CLINIC | Facility: CLINIC | Age: 42
End: 2022-03-31

## 2022-03-31 LAB
25(OH)D3 SERPL-MCNC: 19.3 NG/ML (ref 30–100)
ALBUMIN SERPL-MCNC: 4.8 G/DL (ref 3.5–5.2)
ALBUMIN/GLOB SERPL: 1.9 G/DL
ALP SERPL-CCNC: 77 U/L (ref 39–117)
ALT SERPL W P-5'-P-CCNC: 38 U/L (ref 1–33)
ANION GAP SERPL CALCULATED.3IONS-SCNC: 13.1 MMOL/L (ref 5–15)
AST SERPL-CCNC: 33 U/L (ref 1–32)
BASOPHILS # BLD AUTO: 0.08 10*3/MM3 (ref 0–0.2)
BASOPHILS NFR BLD AUTO: 0.7 % (ref 0–1.5)
BILIRUB SERPL-MCNC: 0.4 MG/DL (ref 0–1.2)
BUN SERPL-MCNC: 9 MG/DL (ref 6–20)
BUN/CREAT SERPL: 14.1 (ref 7–25)
CALCIUM SPEC-SCNC: 9.9 MG/DL (ref 8.6–10.5)
CHLORIDE SERPL-SCNC: 99 MMOL/L (ref 98–107)
CHOLEST SERPL-MCNC: 193 MG/DL (ref 0–200)
CO2 SERPL-SCNC: 24.9 MMOL/L (ref 22–29)
CREAT SERPL-MCNC: 0.64 MG/DL (ref 0.57–1)
DEPRECATED RDW RBC AUTO: 45.5 FL (ref 37–54)
EGFRCR SERPLBLD CKD-EPI 2021: 112.6 ML/MIN/1.73
EOSINOPHIL # BLD AUTO: 0.23 10*3/MM3 (ref 0–0.4)
EOSINOPHIL NFR BLD AUTO: 2.1 % (ref 0.3–6.2)
ERYTHROCYTE [DISTWIDTH] IN BLOOD BY AUTOMATED COUNT: 13.1 % (ref 12.3–15.4)
GLOBULIN UR ELPH-MCNC: 2.5 GM/DL
GLUCOSE SERPL-MCNC: 85 MG/DL (ref 65–99)
HCT VFR BLD AUTO: 40.3 % (ref 34–46.6)
HDLC SERPL-MCNC: 39 MG/DL (ref 40–60)
HGB BLD-MCNC: 13.6 G/DL (ref 12–15.9)
IMM GRANULOCYTES # BLD AUTO: 0.02 10*3/MM3 (ref 0–0.05)
IMM GRANULOCYTES NFR BLD AUTO: 0.2 % (ref 0–0.5)
LDLC SERPL CALC-MCNC: 131 MG/DL (ref 0–100)
LDLC/HDLC SERPL: 3.3 {RATIO}
LYMPHOCYTES # BLD AUTO: 4.1 10*3/MM3 (ref 0.7–3.1)
LYMPHOCYTES NFR BLD AUTO: 38 % (ref 19.6–45.3)
MAGNESIUM SERPL-MCNC: 1.8 MG/DL (ref 1.6–2.6)
MCH RBC QN AUTO: 31.9 PG (ref 26.6–33)
MCHC RBC AUTO-ENTMCNC: 33.7 G/DL (ref 31.5–35.7)
MCV RBC AUTO: 94.6 FL (ref 79–97)
MONOCYTES # BLD AUTO: 0.65 10*3/MM3 (ref 0.1–0.9)
MONOCYTES NFR BLD AUTO: 6 % (ref 5–12)
NEUTROPHILS NFR BLD AUTO: 5.72 10*3/MM3 (ref 1.7–7)
NEUTROPHILS NFR BLD AUTO: 53 % (ref 42.7–76)
NRBC BLD AUTO-RTO: 0 /100 WBC (ref 0–0.2)
PLATELET # BLD AUTO: 276 10*3/MM3 (ref 140–450)
PMV BLD AUTO: 11.3 FL (ref 6–12)
POTASSIUM SERPL-SCNC: 3.8 MMOL/L (ref 3.5–5.2)
PROT SERPL-MCNC: 7.3 G/DL (ref 6–8.5)
RBC # BLD AUTO: 4.26 10*6/MM3 (ref 3.77–5.28)
SODIUM SERPL-SCNC: 137 MMOL/L (ref 136–145)
TRIGL SERPL-MCNC: 127 MG/DL (ref 0–150)
TSH SERPL DL<=0.05 MIU/L-ACNC: 1.51 UIU/ML (ref 0.27–4.2)
VIT B12 BLD-MCNC: 491 PG/ML (ref 211–946)
VLDLC SERPL-MCNC: 23 MG/DL (ref 5–40)
WBC NRBC COR # BLD: 10.8 10*3/MM3 (ref 3.4–10.8)

## 2022-03-31 NOTE — PROGRESS NOTES
Liver functions are mildly elevated so I would limit ibuprofen Aleve Motrin and Tylenol.  Use them only if having serious discomfort.  Liver functions are mildly elevated and the bad cholesterol is 131 with the goal being below 100.  Concern is if we raise the statin that will elevate her liver functions more so she should try to watch the saturated fats and increase her walking.  We will remonitor in 3 months and if still elevated then we may need to raise the statin.  Make sure she is taking the atorvastatin at nighttime.  Both vitamin B12 and D are slightly low so she should increase 5000 mg on the weekend days.

## 2022-04-12 ENCOUNTER — TELEPHONE (OUTPATIENT)
Dept: PSYCHIATRY | Facility: CLINIC | Age: 42
End: 2022-04-12

## 2022-04-12 ENCOUNTER — OFFICE VISIT (OUTPATIENT)
Dept: PSYCHIATRY | Facility: CLINIC | Age: 42
End: 2022-04-12

## 2022-04-12 VITALS
SYSTOLIC BLOOD PRESSURE: 137 MMHG | HEART RATE: 70 BPM | HEIGHT: 60 IN | WEIGHT: 169.8 LBS | OXYGEN SATURATION: 98 % | DIASTOLIC BLOOD PRESSURE: 88 MMHG | BODY MASS INDEX: 33.34 KG/M2

## 2022-04-12 DIAGNOSIS — F32.1 CURRENT MODERATE EPISODE OF MAJOR DEPRESSIVE DISORDER WITHOUT PRIOR EPISODE: ICD-10-CM

## 2022-04-12 DIAGNOSIS — F41.0 SEVERE ANXIETY WITH PANIC: Primary | Chronic | ICD-10-CM

## 2022-04-12 PROBLEM — F41.8 MIXED ANXIETY DEPRESSIVE DISORDER: Status: RESOLVED | Noted: 2018-08-21 | Resolved: 2022-04-12

## 2022-04-12 PROCEDURE — 90792 PSYCH DIAG EVAL W/MED SRVCS: CPT

## 2022-04-12 RX ORDER — CLONAZEPAM 0.5 MG/1
0.5 TABLET ORAL 2 TIMES DAILY PRN
Qty: 60 TABLET | Refills: 1 | Status: SHIPPED | OUTPATIENT
Start: 2022-04-12 | End: 2022-05-12 | Stop reason: SDUPTHER

## 2022-04-12 RX ORDER — HYDROXYZINE HYDROCHLORIDE 25 MG/1
25 TABLET, FILM COATED ORAL 3 TIMES DAILY PRN
Qty: 90 TABLET | Refills: 1 | Status: SHIPPED | OUTPATIENT
Start: 2022-04-12 | End: 2022-06-16

## 2022-04-12 RX ORDER — VILAZODONE HYDROCHLORIDE 20 MG/1
20 TABLET ORAL DAILY
Qty: 30 TABLET | Refills: 2 | Status: SHIPPED | OUTPATIENT
Start: 2022-04-12 | End: 2022-06-23 | Stop reason: SDUPTHER

## 2022-04-12 NOTE — TELEPHONE ENCOUNTER
The patient called to report she is taking hydroxzine 25mg 2 to 3 time daily currently. She said do you want to change. Please advise

## 2022-04-13 NOTE — TELEPHONE ENCOUNTER
Called patient to advise of this information, no answer, left message requesting that she give us a call back.

## 2022-04-14 NOTE — TELEPHONE ENCOUNTER
I spoke with the patient today . She was given provider Christal recommendations. She verbally stated she understood the recommendations given.

## 2022-04-21 ENCOUNTER — TELEPHONE (OUTPATIENT)
Dept: FAMILY MEDICINE CLINIC | Facility: CLINIC | Age: 42
End: 2022-04-21

## 2022-04-21 NOTE — TELEPHONE ENCOUNTER
Caller: STEPHANIE MANDUJANO    Relationship: Other    Best call back number: [phone]    What is the medical concern/diagnosis:     What specialty or service is being requested: STEPHANIE MYERS    What is the provider, practice or medical service name: DR. DAMON TANG    What is the office location:     What is the office phone number: [phone]    Any additional details: PATIENT NEEDS THIS SENT THROUGH [employer], THE ONE THEY HAVE IS

## 2022-04-22 ENCOUNTER — TELEPHONE (OUTPATIENT)
Dept: FAMILY MEDICINE CLINIC | Facility: CLINIC | Age: 42
End: 2022-04-22

## 2022-04-22 DIAGNOSIS — I67.1 ANEURYSM OF LEFT INTERNAL CAROTID ARTERY: Primary | ICD-10-CM

## 2022-04-22 NOTE — TELEPHONE ENCOUNTER
Patient asking for annual referral to [facility], Dr. Jesse Barbour. Last Referral DX was Aneurysm of left internal carotid artery.

## 2022-05-12 ENCOUNTER — OFFICE VISIT (OUTPATIENT)
Dept: PSYCHIATRY | Facility: CLINIC | Age: 42
End: 2022-05-12

## 2022-05-12 DIAGNOSIS — F41.0 SEVERE ANXIETY WITH PANIC: Primary | Chronic | ICD-10-CM

## 2022-05-12 DIAGNOSIS — F32.5 MAJOR DEPRESSIVE DISORDER WITH SINGLE EPISODE, IN FULL REMISSION: Chronic | ICD-10-CM

## 2022-05-12 PROCEDURE — 99214 OFFICE O/P EST MOD 30 MIN: CPT

## 2022-05-12 RX ORDER — CLONAZEPAM 0.5 MG/1
0.5 TABLET ORAL 3 TIMES DAILY PRN
Qty: 90 TABLET | Refills: 1 | Status: SHIPPED | OUTPATIENT
Start: 2022-05-12 | End: 2022-06-23 | Stop reason: SDUPTHER

## 2022-05-17 DIAGNOSIS — F41.0 SEVERE ANXIETY WITH PANIC: Chronic | ICD-10-CM

## 2022-05-17 DIAGNOSIS — G43.109 MIGRAINE WITH AURA AND WITHOUT STATUS MIGRAINOSUS, NOT INTRACTABLE: Primary | ICD-10-CM

## 2022-05-17 DIAGNOSIS — F32.1 CURRENT MODERATE EPISODE OF MAJOR DEPRESSIVE DISORDER WITHOUT PRIOR EPISODE: ICD-10-CM

## 2022-05-17 RX ORDER — AMITRIPTYLINE HYDROCHLORIDE 10 MG/1
10 TABLET, FILM COATED ORAL DAILY
Qty: 30 TABLET | Refills: 1 | OUTPATIENT
Start: 2022-05-17

## 2022-05-17 RX ORDER — AMITRIPTYLINE HYDROCHLORIDE 10 MG/1
10 TABLET, FILM COATED ORAL NIGHTLY
Qty: 30 TABLET | Refills: 2 | Status: SHIPPED | OUTPATIENT
Start: 2022-05-17 | End: 2022-08-18

## 2022-06-07 ENCOUNTER — TELEPHONE (OUTPATIENT)
Dept: FAMILY MEDICINE CLINIC | Facility: CLINIC | Age: 42
End: 2022-06-07

## 2022-06-07 DIAGNOSIS — M79.671 INTRACTABLE RIGHT HEEL PAIN: Primary | ICD-10-CM

## 2022-06-07 NOTE — TELEPHONE ENCOUNTER
Caller: SIMON PARKER    Relationship: Emergency Contact    Best call back number: [phone]    What is the medical concern/diagnosis: RIGHT HEEL PAIN     What specialty or service is being requested: FOOT DOCTOR    What is the office location: [facility]    ADOLFO IS WANTING A REFERRAL TO THE FOOT DOCTOR. PLEASE CALL HER AND ADVISE.

## 2022-06-14 ENCOUNTER — TELEPHONE (OUTPATIENT)
Dept: FAMILY MEDICINE CLINIC | Facility: CLINIC | Age: 42
End: 2022-06-14

## 2022-06-14 DIAGNOSIS — M79.671 FOOT PAIN, RIGHT: Primary | ICD-10-CM

## 2022-06-14 NOTE — TELEPHONE ENCOUNTER
Caller: SIMON PARKER    Relationship: Emergency Contact    Best call back number: [phone]     What is the medical concern/diagnosis: RIGHT FOOT PAIN WHEN WALKING - POSSIBLE BONE SPUR    What specialty or service is being requested: PODIATRY    What is the provider, practice or medical service name: ANY    What is the office location: ANY    What is the office phone number: ANY

## 2022-06-15 PROBLEM — F32.5 MAJOR DEPRESSIVE DISORDER WITH SINGLE EPISODE, IN FULL REMISSION (HCC): Chronic | Status: ACTIVE | Noted: 2022-04-12

## 2022-06-16 DIAGNOSIS — F41.0 SEVERE ANXIETY WITH PANIC: Chronic | ICD-10-CM

## 2022-06-16 RX ORDER — HYDROXYZINE HYDROCHLORIDE 25 MG/1
TABLET, FILM COATED ORAL
Qty: 90 TABLET | Refills: 1 | Status: SHIPPED | OUTPATIENT
Start: 2022-06-16

## 2022-06-23 ENCOUNTER — OFFICE VISIT (OUTPATIENT)
Dept: PSYCHIATRY | Facility: CLINIC | Age: 42
End: 2022-06-23

## 2022-06-23 VITALS
SYSTOLIC BLOOD PRESSURE: 146 MMHG | HEART RATE: 88 BPM | DIASTOLIC BLOOD PRESSURE: 88 MMHG | OXYGEN SATURATION: 97 % | BODY MASS INDEX: 35 KG/M2 | WEIGHT: 179.2 LBS

## 2022-06-23 DIAGNOSIS — F41.0 SEVERE ANXIETY WITH PANIC: Primary | Chronic | ICD-10-CM

## 2022-06-23 DIAGNOSIS — F32.5 MAJOR DEPRESSIVE DISORDER WITH SINGLE EPISODE, IN FULL REMISSION: Chronic | ICD-10-CM

## 2022-06-23 PROCEDURE — 99214 OFFICE O/P EST MOD 30 MIN: CPT

## 2022-06-23 RX ORDER — VILAZODONE HYDROCHLORIDE 20 MG/1
20 TABLET ORAL DAILY
Qty: 30 TABLET | Refills: 2 | Status: SHIPPED | OUTPATIENT
Start: 2022-06-23 | End: 2022-09-18

## 2022-06-23 RX ORDER — CLONAZEPAM 0.5 MG/1
0.5 TABLET ORAL 3 TIMES DAILY PRN
Qty: 90 TABLET | Refills: 1 | Status: SHIPPED | OUTPATIENT
Start: 2022-06-23 | End: 2022-08-23 | Stop reason: SDUPTHER

## 2022-06-23 NOTE — PROGRESS NOTES
"Subjective   Ally Condon is a 41 y.o. female who presents today for f/u med management.    Chief Complaint: Anxiety and insomnia    History of Present Illness:  4/12/22: Pt reports being on anxiety medications for years because she is \"wound tight\" all the time. She started some medications 3 years ago, and feels her job is stressful and a contributor to her anxiety.  -Pt feels depression is currently likely due to life events. She says she lost her 11yo with autism to cancer recently, and her grandmother 2 years ago that she lived with during her childhood. She says her brother has a drug abuse problem and is now looking at going to snf.  -She reports significant irritability and even hostility that she has as a result of her anxiety, ans says she may get a divorce if she cannot get it under control.  -She has hx of known aneurysms in her brain that have required stinting, and this contributes to her anxiety knowing she has them.  SI/HI: Denied.  AVH: Denied.  Possible Manic Sx on MDQ: 9    5/12/22: Pt reports improved mood and anxiety control with Viibryd so far, but some continued insomnia and anxiety control. She understand the goal will be to get enough anxiety control to stop clonazepam, but until that occurs she would like to increase clonazepam due to some breakthrough anxiety. She is not concerned about dependence because she took 1mg TID in the past to improve anxiety and was able to stop it without difficulty.  -Denies depression concerns, SI/HI, or AVH.    6/23/22: Pt says she has improved on the clonazepam 3 times daily and her  has noticed a big difference in her as well. She says she feels like she could react poorly to situations and it helps her not do that, along with the Viibryd. She is willing to go up on dose of Viibryd but not add a mood stabilizing medication because she would rather decrease medications instead of add them. She is hoping botox will help her migraines enough to not " "need amitriptyline, and another treatment for her hives will help her not need hydroxyzine which she is only taking once daily now. Clonazepam isn't being taken 3 times daily every day, but is being taking 3 times daily frequently, and at least 2 times daily. She says she got a promotion at work, and it has added some more stress, but she is handling it well. Willing to continue the current regimen a couple more months before increasing viibryd since increasing further could cause tyrone/hypomania if mood instability is a problem for her. Melatonin is currently helpful for insomnia. Denies feeling unusually elevated, having SI/HI or AVH.    The following portions of the patient's history were reviewed and updated as appropriate: allergies, current medications, past family history, past medical history, past social history, past surgical history and problem list.    PAST OUTPATIENT TREATMENT  Past Psychiatric History:  Diagnoses: Anxiety/Panic, Affective/Bipolar.  Previous Psychiatrist: Needs clarified.  Therapist: Needs clarified.  Self Harm: Needs clarified.  SA: Needs clarified.  Psychiatric Hospitalizations: Needs clarified.   Psychosis, Anxiety, Depression: Needs clarified.  Medication Trials:  Xanax - effective  Klonopin - effective in the past  Buspirone - ineffective after 2-3 months at uncertain dose.  Hydroxyzine - ineffective at 10mg  Amitriptyline  Lexapro - ineffective  Wellbutrin - ineffective  Pristiq - made her \"too happy,\" didn't like how it made her feel hot like BP increased.  Sequelae Of Mental Disorder:  emotional distress    Interval History  Improved on increasing clonazepam dose.    Side Effects  Denied    Allergy:   Allergies   Allergen Reactions   • Penicillins Other (See Comments)     Childhood reaction: reaction unknown, but patient was hospitalized as a result.     • Iodinated Diagnostic Agents Hives     Per states she is not allergic to iodinated diagnostic agents.     "   Discontinued Medications:  Medications Discontinued During This Encounter   Medication Reason   • vilazodone (Viibryd) 20 MG tablet tablet Reorder   • clonazePAM (KlonoPIN) 0.5 MG tablet Reorder     Vital Signs:   /88   Pulse 88   Wt 81.3 kg (179 lb 3.2 oz)   SpO2 97%   BMI 35.00 kg/m²      Mental Status Exam:   Orientation:  To person, Place, Time and Situation  Memory: Recent and remote memory Intact  Mood/Affect: Euthymic  Hopelessness: Denies  Suicidal Ideations: None  Homicidal Ideations:  None  Hallucinations: None  Delusions:  None  Obsessions: None  Behavior and Psychomotor Activity: Normal  Speech:  Normal  Thought Process: Goal directed and linear  Thought Content: Normal  Associations: Intact  Language: name objects and repeat phrases  Concentration and computation: Fair  Attention Span: Fair  Fund of Knowledge: Fair  Reliability: fair  Insight into mental health: Fair  Judgement: Fair  Impulse Control: Fair  Hygiene: good  Cooperation:  Cooperative  Eye Contact: Good  Physical/Medical Issues:  Yes HTN, HLD, hx brain aneurysms    MSE reviewed and accepted with changes from the previous visit.    PHQ-9 Depression Screening  Little interest or pleasure in doing things? 1-->several days   Feeling down, depressed, or hopeless? 0-->not at all   Trouble falling or staying asleep, or sleeping too much? 1-->several days   Feeling tired or having little energy? 1-->several days   Poor appetite or overeating? 0-->not at all   Feeling bad about yourself - or that you are a failure or have let yourself or your family down? 0-->not at all   Trouble concentrating on things, such as reading the newspaper or watching television? 1-->several days   Moving or speaking so slowly that other people could have noticed? Or the opposite - being so fidgety or restless that you have been moving around a lot more than usual? 0-->not at all   Thoughts that you would be better off dead, or of hurting yourself in some way?  0-->not at all   PHQ-9 Total Score 4   If you checked off any problems, how difficult have these problems made it for you to do your work, take care of things at home, or get along with other people? somewhat difficult     Feeling nervous, anxious or on edge: Several days  Not being able to stop or control worrying: Several days  Worrying too much about different things: Several days  Trouble Relaxing: Several days  Being so restless that it is hard to sit still: Not at all  Feeling afraid as if something awful might happen: Not at all  Becoming easily annoyed or irritable: Several days  JIMENA 7 Total Score: 5  If you checked any problems, how difficult have these problems made it for you to do your work, take care of things at home, or get along with other people: Somewhat difficult  PHQ-9: 1; depression in remission. Previously: 0, 13.  JIMENA-7: 5; mild anxiety. Previously: 6, 19.  MDQ: Positive; likely bipolar w/ 9 sxs on MDQ. (4/12/22)    Current every day smoker less than 3 minutes spent counseling Not agreeable to stopping  I advised Ally of the risks of tobacco use.     Lab Results: Reviewed.  CMP    CMP 3/30/22   Glucose 85   BUN 9   Creatinine 0.64   Sodium 137   Potassium 3.8   Chloride 99   Calcium 9.9   Albumin 4.80   Total Bilirubin 0.4   Alkaline Phosphatase 77   AST (SGOT) 33 (A)   ALT (SGPT) 38 (A)   (A) Abnormal value            CBC    CBC 8/20/21 3/4/22 3/30/22   WBC 14.46 (A) 11.37 (A) 10.80   RBC 4.06 4.13 4.26   Hemoglobin 13.0 13.0 13.6   Hematocrit 37.8 39.1 40.3   MCV 93.1 94.7 94.6   MCH 32.0 31.5 31.9   MCHC 34.4 33.2 33.7   RDW 13.1 12.9 13.1   Platelets 309 368 276   (A) Abnormal value            Lipid Panel    Lipid Panel 3/30/22   Total Cholesterol 193   Triglycerides 127   HDL Cholesterol 39 (A)   VLDL Cholesterol 23   LDL Cholesterol  131 (A)   LDL/HDL Ratio 3.30   (A) Abnormal value            TSH    TSH 3/30/22   TSH 1.510                 Assessment & Plan   Diagnoses and all orders  for this visit:    1. Severe anxiety with panic (Primary)  -     vilazodone (Viibryd) 20 MG tablet tablet; Take 1 tablet by mouth Daily.  Dispense: 30 tablet; Refill: 2  -     clonazePAM (KlonoPIN) 0.5 MG tablet; Take 1 tablet by mouth 3 (Three) Times a Day As Needed for Anxiety.  Dispense: 90 tablet; Refill: 1    2. Major depressive disorder with single episode, in full remission ([facility])    PHQ-9: 1; depression in remission. Previously: 0, 13.  JIMENA-7: 5; mild anxiety. Previously: 6, 19.  MDQ: Positive; likely bipolar w/ 9 sxs on MDQ. (4/12/22)  -Refills sent of clonazepam and viibryd, with instruction to fill clonazepam no earlier than 4 weeks since last fill.   -Cont. 0.5mg clonazepam TID for anxiety and panic control.  -Cont 25mg Hydroxyzine TID PRN for anxiety control. Pt currently taking 1 daily if needed for hives.  -Cont. 20mg Viibryd daily due to noted anx/dep improvement. Consider increase in future to help decrease clonazepam use.  -Counseling continues to be encouraged for stability.  -F/U in 2m to assure continued stability and consider increasing viibryd or amitriptyline to less clonazepam need. Monitor wt/bp/hr and see if counseling acquired.    In future:   -Reconsider [facility]  -r/o cyclothymia/bipolar    Visit Diagnoses:    ICD-10-CM ICD-9-CM   1. Severe anxiety with panic  F41.0 300.01   2. Major depressive disorder with single episode, in full remission ([facility])  F32.5 296.26     TREATMENT PLAN/GOALS: In the short-term, the patient is to continue supportive psychotherapy efforts and medications as indicated. Treatment and medication options were discussed during today's visit. Long-term the goal will be to control symptoms so they do not negatively interfere with other aspects of the patient's life. Prognosis is optimistic with implementation of the current treatment plan. Patient ackowledged and verbally consented to the treatment plan and was educated on the importance of compliance with treatment  and follow-up appointments.    MEDICATION ISSUES:  INSPECT reviewed 6/23/22, and is as expected. Taking 0.5mg clonazepam (Q:90).  Discussed medication options and treatment plan of prescribed medication as well as the risks, benefits, and side effects including potential falls, possible impaired driving, and metabolic adversities among others. Patient counseled on a multimodal approach with healthy nutrition, healthy sleep, regular physical activity, social activity, counseling, and medication taking part in his/her psychiatric management. Patient is agreeable to the plan, and he/she agreed to call the office with any worsening of symptoms or onset of side effects. Patient is agreeable to call 911 or go to the nearest ER should he/she begin having SI/HI with plans or anticipated actions or self-harming behavior.  Assisted patient in processing above session content; acknowledged and normalized patient’s thoughts, feelings, and concerns. Reviewed positive coping skills and behavior management in session with positive framing of thoughts. Allowed patient to freely discuss issues without interruption or judgment. Provided safe, confidential environment to facilitate the development of positive therapeutic relationship and encourage open and honest communication.    MEDS ORDERED DURING VISIT:  New Medications Ordered This Visit   Medications   • vilazodone (Viibryd) 20 MG tablet tablet     Sig: Take 1 tablet by mouth Daily.     Dispense:  30 tablet     Refill:  2   • clonazePAM (KlonoPIN) 0.5 MG tablet     Sig: Take 1 tablet by mouth 3 (Three) Times a Day As Needed for Anxiety.     Dispense:  90 tablet     Refill:  1     Fill no earlier than 7/8/22.     Return in about 2 months (around 8/23/2022) for Recheck.     This document has been electronically signed by Diogenes Siegel PA-C  June 23, 2022 16:54 EDT    EMR Dragon transcription disclaimer:  Part of this note may be an electronic transcription/translation of  spoken language to printed text using the Dragon Dictation System.

## 2022-07-25 ENCOUNTER — TELEMEDICINE (OUTPATIENT)
Dept: FAMILY MEDICINE CLINIC | Facility: CLINIC | Age: 42
End: 2022-07-25

## 2022-07-25 DIAGNOSIS — L50.9 URTICARIA: ICD-10-CM

## 2022-07-25 DIAGNOSIS — R11.0 NAUSEA: ICD-10-CM

## 2022-07-25 DIAGNOSIS — R51.9 ACUTE NONINTRACTABLE HEADACHE, UNSPECIFIED HEADACHE TYPE: Primary | ICD-10-CM

## 2022-07-25 DIAGNOSIS — K21.9 GASTROESOPHAGEAL REFLUX DISEASE, UNSPECIFIED WHETHER ESOPHAGITIS PRESENT: ICD-10-CM

## 2022-07-25 LAB
EXPIRATION DATE: NORMAL
FLUAV AG UPPER RESP QL IA.RAPID: NOT DETECTED
FLUBV AG UPPER RESP QL IA.RAPID: NOT DETECTED
INTERNAL CONTROL: NORMAL
Lab: NORMAL
SARS-COV-2 AG UPPER RESP QL IA.RAPID: NOT DETECTED

## 2022-07-25 PROCEDURE — 99213 OFFICE O/P EST LOW 20 MIN: CPT | Performed by: NURSE PRACTITIONER

## 2022-07-25 PROCEDURE — 87428 SARSCOV & INF VIR A&B AG IA: CPT | Performed by: NURSE PRACTITIONER

## 2022-07-25 RX ORDER — ONDANSETRON 4 MG/1
4 TABLET, FILM COATED ORAL EVERY 8 HOURS PRN
Qty: 30 TABLET | Refills: 1 | Status: SHIPPED | OUTPATIENT
Start: 2022-07-25

## 2022-07-25 RX ORDER — FAMOTIDINE 10 MG/1
20 TABLET ORAL DAILY
Status: CANCELLED | OUTPATIENT
Start: 2022-07-25

## 2022-07-25 RX ORDER — FAMOTIDINE 20 MG/1
20 TABLET, FILM COATED ORAL DAILY
Qty: 30 TABLET | Refills: 6 | Status: SHIPPED | OUTPATIENT
Start: 2022-07-25 | End: 2023-01-30

## 2022-07-25 RX ORDER — CETIRIZINE HYDROCHLORIDE 10 MG/1
10 TABLET ORAL DAILY
Qty: 30 TABLET | Refills: 1 | Status: SHIPPED | OUTPATIENT
Start: 2022-07-25 | End: 2022-08-22

## 2022-07-25 NOTE — PROGRESS NOTES
Subjective   Ally Condon is a 41 y.o. female presents for No chief complaint on file.      Health Maintenance Due   Topic Date Due   • COVID-19 Vaccine (1) Never done   • TDAP/TD VACCINES (1 - Tdap) Never done   • Pneumococcal Vaccine 0-64 (2 - PCV) 02/05/2022       History of Present Illness   Pt present for video visit.  She states she has not been feeling well since Saturday.  She states she has been nauseated, has diarrhea, headache x 5 days.  She denies fever or shortness of breath.  She reports increased heart burn the past several days and states she has been out of pepcid.   There were no vitals filed for this visit.  There is no height or weight on file to calculate BMI.    Current Outpatient Medications on File Prior to Visit   Medication Sig Dispense Refill   • Acetaminophen (Tylenol) 325 MG capsule Take  by mouth.     • adapalene (DIFFERIN) 0.3 % gel APPLY TOPICALLY TO THE AFFECTED AREA EVERY MORNING     • albuterol sulfate  (90 Base) MCG/ACT inhaler Inhale 2 puffs Every 4 (Four) Hours As Needed for Wheezing. 8 g 3   • amitriptyline (ELAVIL) 10 MG tablet Take 1 tablet by mouth Every Night. 30 tablet 2   • aspirin 81 MG chewable tablet Chew 81 mg Daily.     • atorvastatin (LIPITOR) 20 MG tablet TAKE 1 TABLET BY MOUTH DAILY 90 tablet 3   • clonazePAM (KlonoPIN) 0.5 MG tablet Take 1 tablet by mouth 3 (Three) Times a Day As Needed for Anxiety. 90 tablet 1   • clopidogrel (PLAVIX) 75 MG tablet TAKE 1 TABLET BY MOUTH DAILY 90 tablet 1   • hydrOXYzine (ATARAX) 25 MG tablet TAKE 1 TABLET BY MOUTH THREE TIMES DAILY AS NEEDED FOR ANXIETY 90 tablet 1   • Melatonin 10 MG tablet Take 10 mg by mouth Every Night.     • metoclopramide (REGLAN) 10 MG tablet Take 10 mg by mouth.     • metoprolol tartrate (LOPRESSOR) 25 MG tablet TAKE 1 TABLET BY MOUTH TWICE DAILY 180 tablet 3   • tolterodine LA (DETROL LA) 2 MG 24 hr capsule Take 2 mg by mouth 2 (Two) Times a Day.     • tretinoin (RETIN-A) 0.1 % cream APPLY PEA  SIZED AMOUNT TOPICALLY TO THE AFFECTED AREA AT BEDTIME     • vilazodone (Viibryd) 20 MG tablet tablet Take 1 tablet by mouth Daily. 30 tablet 2   • [DISCONTINUED] cetirizine (zyrTEC) 10 MG tablet Take 1 tablet by mouth Daily. 30 tablet 1   • [DISCONTINUED] Heartburn Relief 10 MG tablet Take 10 mg by mouth Daily.     • [DISCONTINUED] nicotine (Nicoderm CQ) 21 MG/24HR patch Place 1 patch on the skin as directed by provider Daily. 1 each 2   • [DISCONTINUED] nicotine polacrilex (Nicorette) 2 MG gum Chew 1 each As Needed for Smoking Cessation. 150 each 2   • [DISCONTINUED] sucralfate (Carafate) 1 g tablet Take 1 tablet by mouth 4 (Four) Times a Day. 120 tablet 1     No current facility-administered medications on file prior to visit.       The following portions of the patient's history were reviewed and updated as appropriate: allergies, current medications, past family history, past medical history, past social history, past surgical history, and problem list.    Review of Systems   Constitutional: Positive for fatigue. Negative for chills and fever.   HENT: Negative for sinus pressure and sore throat.    Eyes: Negative for blurred vision.   Respiratory: Negative for cough and shortness of breath.    Cardiovascular: Negative for chest pain.   Gastrointestinal: Positive for diarrhea and nausea. Negative for abdominal pain.   Endocrine: Negative.    Genitourinary: Negative.    Musculoskeletal: Negative for arthralgias and joint swelling.   Skin: Negative for color change.   Allergic/Immunologic: Negative.    Neurological: Positive for headache. Negative for dizziness.   Hematological: Negative.    Psychiatric/Behavioral: Negative for behavioral problems.       Objective   Physical Exam  Vitals and nursing note reviewed.   Constitutional:       Appearance: Normal appearance. She is well-developed. She is ill-appearing.   HENT:      Head: Normocephalic and atraumatic.      Right Ear: External ear normal.      Left Ear:  External ear normal.      Nose: Nose normal.   Eyes:      Extraocular Movements: Extraocular movements intact.      Pupils: Pupils are equal, round, and reactive to light.   Pulmonary:      Effort: Pulmonary effort is normal.   Genitourinary:     Vagina: Normal.   Musculoskeletal:         General: Normal range of motion.      Cervical back: Normal range of motion and neck supple.   Skin:     General: Skin is warm and dry.   Neurological:      General: No focal deficit present.      Mental Status: She is alert and oriented to person, place, and time.   Psychiatric:         Mood and Affect: Mood normal.         Behavior: Behavior normal.         Judgment: Judgment normal.       PHQ-9 Total Score:      Assessment & Plan   Diagnoses and all orders for this visit:    1. Acute nonintractable headache, unspecified headache type (Primary)  -     POCT SARS-CoV-2 Antigen GEORGIE + Flu    2. Gastroesophageal reflux disease, unspecified whether esophagitis present  -     famotidine (Pepcid) 20 MG tablet; Take 1 tablet by mouth Daily.  Dispense: 30 tablet; Refill: 6    3. Urticaria  Comments:  pt instructed on use of antihistamines.  prior records from allergist requested to review and consider ongoing treatment/follow up  Orders:  -     cetirizine (zyrTEC) 10 MG tablet; Take 1 tablet by mouth Daily.  Dispense: 30 tablet; Refill: 1    4. Nausea  -     ondansetron (Zofran) 4 MG tablet; Take 1 tablet by mouth Every 8 (Eight) Hours As Needed for Nausea or Vomiting.  Dispense: 30 tablet; Refill: 1        There are no Patient Instructions on file for this visit.

## 2022-08-02 ENCOUNTER — TELEPHONE (OUTPATIENT)
Dept: FAMILY MEDICINE CLINIC | Facility: CLINIC | Age: 42
End: 2022-08-02

## 2022-08-02 NOTE — TELEPHONE ENCOUNTER
Caller: Ally Condon    Relationship: Self    Best call back number: [phone]     What medication are you requesting: ANTIBIOTIC    What are your current symptoms: FREQUENT URINATION, BURNING/ITCHING    How long have you been experiencing symptoms: ABOUT 2 DAYS    Have you had these symptoms before:    [] Yes  [x] No    Have you been treated for these symptoms before:   [] Yes  [x] No    If a prescription is needed, what is your preferred pharmacy and phone number: [id number] - [place], [place] - [address] AT [address] [facility] - [phone]  - [phone]      Additional notes: PATIENT WANTING TO KNOW IF SOMETHING CAN BE CALLED IN FOR A UTI.    PLEASE ADVISE

## 2022-08-04 NOTE — TELEPHONE ENCOUNTER
I called and spoke with the patient and she stated that she works and that she would try to figure something out.

## 2022-08-04 NOTE — TELEPHONE ENCOUNTER
patient upset crying that she cant take off work and wants to do a video visit. I explained with a UTI/yeast infection  we will still need to have a UA.     I told her I would ask or she could still go to [facility] after she gets off work.

## 2022-08-04 NOTE — TELEPHONE ENCOUNTER
Yes, advise [facility] visit after work.  Inmeanwhile increase fluids and if flank pain, fever, blood in urine or chills will be glad to see in the office today

## 2022-08-05 ENCOUNTER — OFFICE VISIT (OUTPATIENT)
Dept: FAMILY MEDICINE CLINIC | Facility: CLINIC | Age: 42
End: 2022-08-05

## 2022-08-05 VITALS
WEIGHT: 184.4 LBS | TEMPERATURE: 99.3 F | BODY MASS INDEX: 36.2 KG/M2 | OXYGEN SATURATION: 97 % | DIASTOLIC BLOOD PRESSURE: 87 MMHG | HEIGHT: 60 IN | SYSTOLIC BLOOD PRESSURE: 143 MMHG | HEART RATE: 70 BPM

## 2022-08-05 DIAGNOSIS — R05.9 COUGH: ICD-10-CM

## 2022-08-05 DIAGNOSIS — N89.8 VAGINAL ITCHING: ICD-10-CM

## 2022-08-05 DIAGNOSIS — N30.00 ACUTE CYSTITIS WITHOUT HEMATURIA: Primary | ICD-10-CM

## 2022-08-05 LAB
BILIRUB BLD-MCNC: NEGATIVE MG/DL
CLARITY, POC: CLEAR
COLOR UR: YELLOW
EXPIRATION DATE: NORMAL
GLUCOSE UR STRIP-MCNC: NEGATIVE MG/DL
KETONES UR QL: NEGATIVE
LEUKOCYTE EST, POC: NEGATIVE
Lab: NORMAL
NITRITE UR-MCNC: NEGATIVE MG/ML
PH UR: 6.5 [PH] (ref 5–8)
PROT UR STRIP-MCNC: NEGATIVE MG/DL
RBC # UR STRIP: NEGATIVE /UL
SP GR UR: 1.01 (ref 1–1.03)
UROBILINOGEN UR QL: NORMAL

## 2022-08-05 PROCEDURE — 99213 OFFICE O/P EST LOW 20 MIN: CPT | Performed by: PREVENTIVE MEDICINE

## 2022-08-05 PROCEDURE — 81003 URINALYSIS AUTO W/O SCOPE: CPT | Performed by: PREVENTIVE MEDICINE

## 2022-08-05 PROCEDURE — U0004 COV-19 TEST NON-CDC HGH THRU: HCPCS | Performed by: PREVENTIVE MEDICINE

## 2022-08-05 NOTE — PATIENT INSTRUCTIONS
Health Maintenance Due   Topic Date Due    COVID-19 Vaccine (1) Never done    TDAP/TD VACCINES (1 - Tdap) Never done    Pneumococcal Vaccine 0-64 (2 - PCV) 02/05/2022    Advise use topical yeast meds over the counter till results of Covid today and if negative again Monday- and then would consider Diflucan pills.  Patient to call urology about urgency of urination.How to Quarantine at Home  Information for Patients and Families    These instructions are for people with confirmed or suspected COVID-19 who do not need to be hospitalized and those with confirmed COVID-19 who were hospitalized and discharged to care for themselves at home.    If you were tested through the Health Department  The Health Department will monitor your wellbeing.  If it is determined that you do not need to be hospitalized and can be isolated at home, you will be monitored by staff from your local or state health department.     If you were tested through a Commercial [facility]  You will need to monitor yourself and report changes in your symptoms to your doctor.  See the section below called Monitor Your Symptoms.    Follow these steps until a healthcare provider or local or state health department says you can return to your normal activities.    Stay home except to get medical care  Restrict activities outside your home, except for getting medical care.   Do not go to work, school, or public areas.   Avoid using public transportation, ride-sharing, or taxis.    Separate yourself from other people and animals in your home  People  As much as possible, you should stay in a specific room and away from other people in your home. Also, you should use a separate bathroom, if available.    Animals  You should restrict contact with pets and other animals while you are sick with COVID-19, just like you would around other people. When possible, have another member of your household care for your animals while you are sick. If you are sick with COVID-19,  avoid contact with your pet, including petting, snuggling, being kissed or licked, and sharing food. If you must care for your pet or be around animals while you are sick, wash your hands before and after you interact with pets and wear a facemask. See COVID-19 and Animals for more information.    Call ahead before visiting your doctor  If you have a medical appointment, call the healthcare provider and tell them that you have or may have COVID-19. This information will help the healthcare provider’s office take steps to keep other people from getting infected or exposed.    Wear a facemask  You should wear a facemask when you are around other people (e.g., sharing a room or vehicle) or pets and before you enter a healthcare provider’s office.     If you are not able to wear a facemask (for example, because it causes trouble breathing), then people who live with you should not stay in the same room with you, or they should wear a facemask if they enter your room.    Cover your coughs and sneezes  Cover your mouth and nose with a tissue when you cough or sneeze.   Throw used tissues in a lined trash can.   Immediately wash your hands with soap and water for at least 20 seconds or, if soap and water are not available, clean your hands with an alcohol-based hand  that contains at least 60% alcohol.    Clean your hands often  Wash your hands often with soap and water for at least 20 seconds, especially after blowing your nose, coughing, or sneezing; going to the bathroom; and before eating or preparing food.     If soap and water are not readily available, use an alcohol-based hand  with at least 60% alcohol, covering all surfaces of your hands and rubbing them together until they feel dry.    Soap and water are the best option if hands are visibly dirty. Avoid touching your eyes, nose, and mouth with unwashed hands.    Avoid sharing personal household items  You should not share dishes, drinking  glasses, cups, eating utensils, towels, or bedding with other people or pets in your home.   After using these items, they should be washed thoroughly with soap and water.    Clean all “high-touch” surfaces everyday  High touch surfaces include counters, tabletops, doorknobs, bathroom fixtures, toilets, phones, keyboards, tablets, and bedside tables.   Also, clean any surfaces that may have blood, stool, or body fluids on them.   Use a household cleaning spray or wipe, according to the label instructions. Labels contain instructions for safe and effective use of the cleaning product, including precautions you should take when applying the product, such as wearing gloves and making sure you have good ventilation during use of the product.    Monitor your symptoms  Seek prompt medical attention if your illness is worsening (e.g., difficulty breathing).   Before seeking care, call your healthcare provider and tell them that you have, or are being evaluated for, COVID-19.   Put on a facemask before you enter the facility.     These steps will help the healthcare provider’s office to keep other people in the office or waiting room from getting infected or exposed.   Persons who are placed under active monitoring or facilitated self-monitoring should follow instructions provided by their local health department or occupational health professionals, as appropriate.  If you have a medical emergency and need to call 911, notify the dispatch personnel that you have, or are being evaluated for COVID-19. If possible, put on a facemask before emergency medical services arrive.    Discontinuing home isolation  Patients with confirmed COVID-19 should remain under home isolation precautions until the risk of secondary transmission to others is thought to be low. The decision to discontinue home isolation precautions should be made on a case-by-case basis, in consultation with healthcare providers and [facility] and local health  departments.    The below content are for household members, intimate partners, and caregivers of a patient with symptomatic laboratory-confirmed COVID-19 or a patient under investigation:    Household members, intimate partners, and caregivers may have close contact with a person with symptomatic, laboratory-confirmed COVID-19 or a person under investigation.     Close contacts should monitor their health; they should call their healthcare provider right away if they develop symptoms suggestive of COVID-19 (e.g., fever, cough, shortness of breath)     Close contacts should also follow these recommendations:  Make sure that you understand and can help the patient follow their healthcare provider’s instructions for medication(s) and care. You should help the patient with basic needs in the home and provide support for getting groceries, prescriptions, and other personal needs.  Monitor the patient’s symptoms. If the patient is getting sicker, call his or her healthcare provider and tell them that the patient has laboratory-confirmed COVID-19. This will help the healthcare provider’s office take steps to keep other people in the office or waiting room from getting infected. Ask the healthcare provider to call the local or [facility] health department for additional guidance. If the patient has a medical emergency and you need to call 911, notify the dispatch personnel that the patient has, or is being evaluated for COVID-19.  Household members should stay in another room or be  from the patient as much as possible. Household members should use a separate bedroom and bathroom, if available.  Prohibit visitors who do not have an essential need to be in the home.  Household members should care for any pets in the home. Do not handle pets or other animals while sick.  For more information, see COVID-19 and Animals.  Make sure that shared spaces in the home have good air flow, such as by an air conditioner or an opened  window, weather permitting.  Perform hand hygiene frequently. Wash your hands often with soap and water for at least 20 seconds or use an alcohol-based hand  that contains 60 to 95% alcohol, covering all surfaces of your hands and rubbing them together until they feel dry. Soap and water should be used preferentially if hands are visibly dirty.  Avoid touching your eyes, nose, and mouth with unwashed hands.  The patient should wear a facemask when you are around other people. If the patient is not able to wear a facemask (for example, because it causes trouble breathing), you, as the caregiver, should wear a mask when you are in the same room as the patient.  Wear a disposable facemask and gloves when you touch or have contact with the patient’s blood, stool, or body fluids, such as saliva, sputum, nasal mucus, vomit, or urine.   Throw out disposable facemasks and gloves after using them. Do not reuse.  When removing personal protective equipment, first remove and dispose of gloves. Then, immediately clean your hands with soap and water or alcohol-based hand . Next, remove and dispose of facemask, and immediately clean your hands again with soap and water or alcohol-based hand .  Avoid sharing household items with the patient. You should not share dishes, drinking glasses, cups, eating utensils, towels, bedding, or other items. After the patient uses these items, you should wash them thoroughly (see below “Wash laundry thoroughly”).  Clean all “high-touch” surfaces, such as counters, tabletops, doorknobs, bathroom fixtures, toilets, phones, keyboards, tablets, and bedside tables, every day. Also, clean any surfaces that may have blood, stool, or body fluids on them.   Use a household cleaning spray or wipe, according to the label instructions. Labels contain instructions for safe and effective use of the cleaning product including precautions you should take when applying the product, such  as wearing gloves and making sure you have good ventilation during use of the product.  Wash laundry thoroughly.   Immediately remove and wash clothes or bedding that have blood, stool, or body fluids on them.  Wear disposable gloves while handling soiled items and keep soiled items away from your body. Clean your hands (with soap and water or an alcohol-based hand ) immediately after removing your gloves.  Read and follow directions on labels of laundry or clothing items and detergent. In general, using a normal laundry detergent according to washing machine instructions and dry thoroughly using the warmest temperatures recommended on the clothing label.  Place all used disposable gloves, facemasks, and other contaminated items in a lined container before disposing of them with other household waste. Clean your hands (with soap and water or an alcohol-based hand ) immediately after handling these items. Soap and water should be used preferentially if hands are visibly dirty.  Discuss any additional questions with your state or local health department or healthcare provider.    Adapted from information provided by the Centers for Disease Control and Prevention.  For more information, visit https://www.cdc.gov/coronavirus/2019-ncov/hcp/guidance-prevent-spread.html

## 2022-08-06 LAB — SARS-COV-2 ORF1AB RESP QL NAA+PROBE: NOT DETECTED

## 2022-08-06 NOTE — PROGRESS NOTES
COVID test was negative however since she was exposed at work I would be glad to repeat that again on Monday of next week.  In the meanwhile she should continue to quarantine until the second negative test is recorded.  Call with any other questions or concerns.  If she wishes to start fluconazole after the second negative test we will provide that please let us know.

## 2022-08-06 NOTE — PROGRESS NOTES
"Subjective   Ally Condon is a 41 y.o. female presents for   Chief Complaint   Patient presents with   • Urinary Tract Infection     Patient call to set up the appointment for what she thought was a urinary tract infection.  She is also having some vaginal itching and could have a yeast infection.  Urine was found not to be infected today she has not had any flank pain fever or hematuria so she will treat with over-the-counter yeast infection medicine until the results of her PCR COVID test are known tomorrow.  She has been exposed to COVID at work and has been coughing and having fatigue for the last 2 days maybe has had some mild fever and is somewhat hoarse.  Has had some diarrhea so she will watch her spicy and greasy foods and limit her dairy as well.  Health Maintenance Due   Topic Date Due   • COVID-19 Vaccine (1) Never done   • TDAP/TD VACCINES (1 - Tdap) Never done   • Pneumococcal Vaccine 0-64 (2 - PCV) 02/05/2022       History of Present Illness     Vitals:    08/05/22 1534 08/05/22 1538   BP: 137/91 143/87   BP Location: Right arm Left arm   Patient Position: Sitting Sitting   Cuff Size: Adult Adult   Pulse: 70    Temp: 99.3 °F (37.4 °C)    TempSrc: Temporal    SpO2: 97%    Weight: 83.6 kg (184 lb 6.4 oz)    Height: 152.4 cm (60\")      Body mass index is 36.01 kg/m².    Current Outpatient Medications on File Prior to Visit   Medication Sig Dispense Refill   • Acetaminophen (Tylenol) 325 MG capsule Take  by mouth.     • adapalene (DIFFERIN) 0.3 % gel APPLY TOPICALLY TO THE AFFECTED AREA EVERY MORNING     • albuterol sulfate  (90 Base) MCG/ACT inhaler Inhale 2 puffs Every 4 (Four) Hours As Needed for Wheezing. 8 g 3   • amitriptyline (ELAVIL) 10 MG tablet Take 1 tablet by mouth Every Night. 30 tablet 2   • aspirin 81 MG chewable tablet Chew 81 mg Daily.     • atorvastatin (LIPITOR) 20 MG tablet TAKE 1 TABLET BY MOUTH DAILY 90 tablet 3   • cetirizine (zyrTEC) 10 MG tablet Take 1 tablet by mouth " Daily. 30 tablet 1   • clonazePAM (KlonoPIN) 0.5 MG tablet Take 1 tablet by mouth 3 (Three) Times a Day As Needed for Anxiety. 90 tablet 1   • clopidogrel (PLAVIX) 75 MG tablet TAKE 1 TABLET BY MOUTH DAILY 90 tablet 1   • famotidine (Pepcid) 20 MG tablet Take 1 tablet by mouth Daily. 30 tablet 6   • hydrOXYzine (ATARAX) 25 MG tablet TAKE 1 TABLET BY MOUTH THREE TIMES DAILY AS NEEDED FOR ANXIETY 90 tablet 1   • Melatonin 10 MG tablet Take 10 mg by mouth Every Night.     • metoclopramide (REGLAN) 10 MG tablet Take 10 mg by mouth.     • metoprolol tartrate (LOPRESSOR) 25 MG tablet TAKE 1 TABLET BY MOUTH TWICE DAILY 180 tablet 3   • ondansetron (Zofran) 4 MG tablet Take 1 tablet by mouth Every 8 (Eight) Hours As Needed for Nausea or Vomiting. 30 tablet 1   • tolterodine LA (DETROL LA) 2 MG 24 hr capsule Take 2 mg by mouth 2 (Two) Times a Day.     • tretinoin (RETIN-A) 0.1 % cream APPLY PEA SIZED AMOUNT TOPICALLY TO THE AFFECTED AREA AT BEDTIME     • vilazodone (Viibryd) 20 MG tablet tablet Take 1 tablet by mouth Daily. 30 tablet 2     No current facility-administered medications on file prior to visit.       The following portions of the patient's history were reviewed and updated as appropriate: allergies, current medications, past family history, past medical history, past social history, past surgical history and problem list.    Review of Systems   Constitutional: Positive for fatigue and fever.   HENT: Positive for sore throat and voice change.    Respiratory: Positive for cough.    Gastrointestinal: Positive for diarrhea.   Genitourinary: Positive for dysuria and vaginal discharge. Negative for hematuria.       Objective   Physical Exam  Vitals reviewed.   Constitutional:       General: She is not in acute distress.     Appearance: She is well-developed. She is obese. She is not ill-appearing or toxic-appearing.   HENT:      Head: Normocephalic and atraumatic.      Right Ear: Tympanic membrane, ear canal and  external ear normal.      Left Ear: Tympanic membrane, ear canal and external ear normal.      Nose: Nose normal.      Mouth/Throat:      Mouth: Mucous membranes are moist.      Pharynx: Posterior oropharyngeal erythema present.   Eyes:      Extraocular Movements: Extraocular movements intact.      Conjunctiva/sclera: Conjunctivae normal.      Pupils: Pupils are equal, round, and reactive to light.   Cardiovascular:      Rate and Rhythm: Normal rate and regular rhythm.      Heart sounds: Normal heart sounds.   Pulmonary:      Effort: Pulmonary effort is normal.      Breath sounds: No wheezing or rhonchi.      Comments: Decreased breath sounds bilaterally  Abdominal:      General: Bowel sounds are normal. There is no distension.      Palpations: Abdomen is soft. There is no mass.      Tenderness: There is no abdominal tenderness.   Musculoskeletal:         General: Normal range of motion.      Cervical back: Neck supple.   Skin:     General: Skin is warm.   Neurological:      General: No focal deficit present.      Mental Status: She is alert and oriented to person, place, and time.   Psychiatric:         Mood and Affect: Mood normal.         Behavior: Behavior normal.       PHQ-9 Total Score:      Assessment & Plan   Diagnoses and all orders for this visit:    1. Acute cystitis without hematuria (Primary)  Comments:  Patient had dysuria for the last few days no blood in her urine fever chills or flank pain.  Urine was negative for UTI today  Orders:  -     COVID-19,APTIMA PANTHER([facility]),[facility], NP/OP SWAB IN UTM/VTM/SALINE TRANSPORT MEDIA,24 HR TAT - Swab, Nasal Cavity; Future  -     POCT urinalysis dipstick, automated  -     COVID-19,APTIMA PANTHER([place]),[facility], NP/OP SWAB IN UTM/VTM/SALINE TRANSPORT MEDIA,24 HR TAT - Swab, Nasal Cavity    2. Cough  Comments:  Cough for 2 days no definite fever has had COVID exposure at work COVID vaccination.  Has been somewhat fatigued  Orders:  -     COVID-19,APTIMA  PANTHER([place]STA),[facility], NP/OP SWAB IN UTM/VTM/SALINE TRANSPORT MEDIA,24 HR TAT - Swab, Nasal Cavity; Future  -     POCT urinalysis dipstick, automated  -     COVID-19,APTIMA PANTHER([place]),[facility], NP/OP SWAB IN UTM/VTM/SALINE TRANSPORT MEDIA,24 HR TAT - Swab, Nasal Cavity    3. Vaginal itching  Comments:  Vaginal itching for the last few days.  She thought she had a UTI but not think she has a yeast infection she will treat locally with over-the-counter preparati        Patient Instructions     Health Maintenance Due   Topic Date Due   • COVID-19 Vaccine (1) Never done   • TDAP/TD VACCINES (1 - Tdap) Never done   • Pneumococcal Vaccine 0-64 (2 - PCV) 02/05/2022    Advise use topical yeast meds over the counter till results of Covid today and if negative again Monday- and then would consider Diflucan pills.  Patient to call urology about urgency of urination.How to Quarantine at Home  Information for Patients and Families    These instructions are for people with confirmed or suspected COVID-19 who do not need to be hospitalized and those with confirmed COVID-19 who were hospitalized and discharged to care for themselves at home.    If you were tested through the Health Department  The Health Department will monitor your wellbeing.  If it is determined that you do not need to be hospitalized and can be isolated at home, you will be monitored by staff from your local or state health department.     If you were tested through a Commercial Lab  You will need to monitor yourself and report changes in your symptoms to your doctor.  See the section below called Monitor Your Symptoms.    Follow these steps until a healthcare provider or local or state health department says you can return to your normal activities.    Stay home except to get medical care  • Restrict activities outside your home, except for getting medical care.   • Do not go to work, school, or public areas.   • Avoid using public transportation,  ride-sharing, or taxis.    Separate yourself from other people and animals in your home  People  As much as possible, you should stay in a specific room and away from other people in your home. Also, you should use a separate bathroom, if available.    Animals  You should restrict contact with pets and other animals while you are sick with COVID-19, just like you would around other people. When possible, have another member of your household care for your animals while you are sick. If you are sick with COVID-19, avoid contact with your pet, including petting, snuggling, being kissed or licked, and sharing food. If you must care for your pet or be around animals while you are sick, wash your hands before and after you interact with pets and wear a facemask. See COVID-19 and Animals for more information.    Call ahead before visiting your doctor  If you have a medical appointment, call the healthcare provider and tell them that you have or may have COVID-19. This information will help the healthcare provider’s office take steps to keep other people from getting infected or exposed.    Wear a facemask  You should wear a facemask when you are around other people (e.g., sharing a room or vehicle) or pets and before you enter a healthcare provider’s office.     If you are not able to wear a facemask (for example, because it causes trouble breathing), then people who live with you should not stay in the same room with you, or they should wear a facemask if they enter your room.    Cover your coughs and sneezes  • Cover your mouth and nose with a tissue when you cough or sneeze.   • Throw used tissues in a lined trash can.   • Immediately wash your hands with soap and water for at least 20 seconds or, if soap and water are not available, clean your hands with an alcohol-based hand  that contains at least 60% alcohol.    Clean your hands often  • Wash your hands often with soap and water for at least 20 seconds,  especially after blowing your nose, coughing, or sneezing; going to the bathroom; and before eating or preparing food.     • If soap and water are not readily available, use an alcohol-based hand  with at least 60% alcohol, covering all surfaces of your hands and rubbing them together until they feel dry.    • Soap and water are the best option if hands are visibly dirty. Avoid touching your eyes, nose, and mouth with unwashed hands.    Avoid sharing personal household items  • You should not share dishes, drinking glasses, cups, eating utensils, towels, or bedding with other people or pets in your home.   • After using these items, they should be washed thoroughly with soap and water.    Clean all “high-touch” surfaces everyday  • High touch surfaces include counters, tabletops, doorknobs, bathroom fixtures, toilets, phones, keyboards, tablets, and bedside tables.   • Also, clean any surfaces that may have blood, stool, or body fluids on them.   • Use a household cleaning spray or wipe, according to the label instructions. Labels contain instructions for safe and effective use of the cleaning product, including precautions you should take when applying the product, such as wearing gloves and making sure you have good ventilation during use of the product.    Monitor your symptoms  • Seek prompt medical attention if your illness is worsening (e.g., difficulty breathing).   • Before seeking care, call your healthcare provider and tell them that you have, or are being evaluated for, COVID-19.   • Put on a facemask before you enter the facility.     • These steps will help the healthcare provider’s office to keep other people in the office or waiting room from getting infected or exposed.   • Persons who are placed under active monitoring or facilitated self-monitoring should follow instructions provided by their local health department or occupational health professionals, as appropriate.  • If you have a  medical emergency and need to call 911, notify the dispatch personnel that you have, or are being evaluated for COVID-19. If possible, put on a facemask before emergency medical services arrive.    Discontinuing home isolation  Patients with confirmed COVID-19 should remain under home isolation precautions until the risk of secondary transmission to others is thought to be low. The decision to discontinue home isolation precautions should be made on a case-by-case basis, in consultation with healthcare providers and state and local health departments.    The below content are for household members, intimate partners, and caregivers of a patient with symptomatic laboratory-confirmed COVID-19 or a patient under investigation:    Household members, intimate partners, and caregivers may have close contact with a person with symptomatic, laboratory-confirmed COVID-19 or a person under investigation.     Close contacts should monitor their health; they should call their healthcare provider right away if they develop symptoms suggestive of COVID-19 (e.g., fever, cough, shortness of breath)     Close contacts should also follow these recommendations:  • Make sure that you understand and can help the patient follow their healthcare provider’s instructions for medication(s) and care. You should help the patient with basic needs in the home and provide support for getting groceries, prescriptions, and other personal needs.  • Monitor the patient’s symptoms. If the patient is getting sicker, call his or her healthcare provider and tell them that the patient has laboratory-confirmed COVID-19. This will help the healthcare provider’s office take steps to keep other people in the office or waiting room from getting infected. Ask the healthcare provider to call the local or state health department for additional guidance. If the patient has a medical emergency and you need to call 911, notify the dispatch personnel that the patient  has, or is being evaluated for COVID-19.  • Household members should stay in another room or be  from the patient as much as possible. Household members should use a separate bedroom and bathroom, if available.  • Prohibit visitors who do not have an essential need to be in the home.  • Household members should care for any pets in the home. Do not handle pets or other animals while sick.  For more information, see COVID-19 and Animals.  • Make sure that shared spaces in the home have good air flow, such as by an air conditioner or an opened window, weather permitting.  • Perform hand hygiene frequently. Wash your hands often with soap and water for at least 20 seconds or use an alcohol-based hand  that contains 60 to 95% alcohol, covering all surfaces of your hands and rubbing them together until they feel dry. Soap and water should be used preferentially if hands are visibly dirty.  • Avoid touching your eyes, nose, and mouth with unwashed hands.  • The patient should wear a facemask when you are around other people. If the patient is not able to wear a facemask (for example, because it causes trouble breathing), you, as the caregiver, should wear a mask when you are in the same room as the patient.  • Wear a disposable facemask and gloves when you touch or have contact with the patient’s blood, stool, or body fluids, such as saliva, sputum, nasal mucus, vomit, or urine.   o Throw out disposable facemasks and gloves after using them. Do not reuse.  o When removing personal protective equipment, first remove and dispose of gloves. Then, immediately clean your hands with soap and water or alcohol-based hand . Next, remove and dispose of facemask, and immediately clean your hands again with soap and water or alcohol-based hand .  • Avoid sharing household items with the patient. You should not share dishes, drinking glasses, cups, eating utensils, towels, bedding, or other items.  After the patient uses these items, you should wash them thoroughly (see below “Wash laundry thoroughly”).  • Clean all “high-touch” surfaces, such as counters, tabletops, doorknobs, bathroom fixtures, toilets, phones, keyboards, tablets, and bedside tables, every day. Also, clean any surfaces that may have blood, stool, or body fluids on them.   o Use a household cleaning spray or wipe, according to the label instructions. Labels contain instructions for safe and effective use of the cleaning product including precautions you should take when applying the product, such as wearing gloves and making sure you have good ventilation during use of the product.  • Wash laundry thoroughly.   o Immediately remove and wash clothes or bedding that have blood, stool, or body fluids on them.  o Wear disposable gloves while handling soiled items and keep soiled items away from your body. Clean your hands (with soap and water or an alcohol-based hand ) immediately after removing your gloves.  o Read and follow directions on labels of laundry or clothing items and detergent. In general, using a normal laundry detergent according to washing machine instructions and dry thoroughly using the warmest temperatures recommended on the clothing label.  • Place all used disposable gloves, facemasks, and other contaminated items in a lined container before disposing of them with other household waste. Clean your hands (with soap and water or an alcohol-based hand ) immediately after handling these items. Soap and water should be used preferentially if hands are visibly dirty.  • Discuss any additional questions with your state or local health department or healthcare provider.    Adapted from information provided by the Centers for Disease Control and Prevention.  For more information, visit https://www.cdc.gov/coronavirus/2019-ncov/hcp/guidance-prevent-spread.html

## 2022-08-18 DIAGNOSIS — F32.1 CURRENT MODERATE EPISODE OF MAJOR DEPRESSIVE DISORDER WITHOUT PRIOR EPISODE: ICD-10-CM

## 2022-08-18 DIAGNOSIS — F41.0 SEVERE ANXIETY WITH PANIC: Chronic | ICD-10-CM

## 2022-08-18 DIAGNOSIS — G43.109 MIGRAINE WITH AURA AND WITHOUT STATUS MIGRAINOSUS, NOT INTRACTABLE: ICD-10-CM

## 2022-08-18 RX ORDER — AMITRIPTYLINE HYDROCHLORIDE 10 MG/1
10 TABLET, FILM COATED ORAL NIGHTLY
Qty: 30 TABLET | Refills: 2 | Status: SHIPPED | OUTPATIENT
Start: 2022-08-18 | End: 2022-11-06

## 2022-08-21 DIAGNOSIS — L50.9 URTICARIA: ICD-10-CM

## 2022-08-22 RX ORDER — CETIRIZINE HYDROCHLORIDE 10 MG/1
TABLET ORAL
Qty: 90 TABLET | Refills: 3 | Status: SHIPPED | OUTPATIENT
Start: 2022-08-22

## 2022-08-23 DIAGNOSIS — F41.0 SEVERE ANXIETY WITH PANIC: Chronic | ICD-10-CM

## 2022-08-23 NOTE — TELEPHONE ENCOUNTER
Patient called needing a refill sent in of clonazepam later in week. She had an appt 8/30 but got moved to 9/27/22    [facility] ran  Last fill 8/5/22    Last appt 6/23/22  Next 9/27/22

## 2022-08-25 RX ORDER — CLONAZEPAM 0.5 MG/1
0.5 TABLET ORAL 3 TIMES DAILY PRN
Qty: 90 TABLET | Refills: 1 | Status: SHIPPED | OUTPATIENT
Start: 2022-09-02 | End: 2022-10-24 | Stop reason: SDUPTHER

## 2022-08-25 NOTE — TELEPHONE ENCOUNTER
Since she picked it up 8/5 per [facility], the earliest she can pick I up again is 9/2/22. Rx sent to be filled on that date.

## 2022-09-16 DIAGNOSIS — F41.0 SEVERE ANXIETY WITH PANIC: Chronic | ICD-10-CM

## 2022-09-18 RX ORDER — VILAZODONE HYDROCHLORIDE 20 MG/1
20 TABLET ORAL DAILY
Qty: 30 TABLET | Refills: 2 | Status: SHIPPED | OUTPATIENT
Start: 2022-09-18 | End: 2023-01-24 | Stop reason: SDUPTHER

## 2022-09-27 ENCOUNTER — OFFICE VISIT (OUTPATIENT)
Dept: PSYCHIATRY | Facility: CLINIC | Age: 42
End: 2022-09-27

## 2022-09-27 VITALS
DIASTOLIC BLOOD PRESSURE: 94 MMHG | SYSTOLIC BLOOD PRESSURE: 152 MMHG | BODY MASS INDEX: 35.35 KG/M2 | OXYGEN SATURATION: 98 % | HEART RATE: 70 BPM | WEIGHT: 181 LBS

## 2022-09-27 DIAGNOSIS — F51.05 INSOMNIA DUE TO OTHER MENTAL DISORDER: Primary | Chronic | ICD-10-CM

## 2022-09-27 DIAGNOSIS — F32.5 MAJOR DEPRESSIVE DISORDER WITH SINGLE EPISODE, IN FULL REMISSION: Chronic | ICD-10-CM

## 2022-09-27 DIAGNOSIS — F41.0 SEVERE ANXIETY WITH PANIC: Chronic | ICD-10-CM

## 2022-09-27 DIAGNOSIS — F99 INSOMNIA DUE TO OTHER MENTAL DISORDER: Primary | Chronic | ICD-10-CM

## 2022-09-27 PROCEDURE — 99214 OFFICE O/P EST MOD 30 MIN: CPT

## 2022-09-27 RX ORDER — PROCHLORPERAZINE MALEATE 10 MG
TABLET ORAL
COMMUNITY
Start: 2022-06-20

## 2022-09-27 RX ORDER — QUETIAPINE FUMARATE 50 MG/1
50 TABLET, FILM COATED ORAL NIGHTLY
Qty: 30 TABLET | Refills: 2 | Status: SHIPPED | OUTPATIENT
Start: 2022-09-27

## 2022-09-27 NOTE — PROGRESS NOTES
"Subjective   Ally Condon is a 41 y.o. female who presents today for f/u med management.    Chief Complaint: Anxiety and insomnia    History of Present Illness:  4/12/22: Pt reports being on anxiety medications for years because she is \"wound tight\" all the time. She started some medications 3 years ago, and feels her job is stressful and a contributor to her anxiety.  -Pt feels depression is currently likely due to life events. She says she lost her 13yo with autism to cancer recently, and her grandmother 2 years ago that she lived with during her childhood. She says her brother has a drug abuse problem and is now looking at going to halfway.  -She reports significant irritability and even hostility that she has as a result of her anxiety, ans says she may get a divorce if she cannot get it under control.  -She has hx of known aneurysms in her brain that have required stinting, and this contributes to her anxiety knowing she has them.  SI/HI: Denied.  AVH: Denied.  Possible Manic Sx on MDQ: 9    5/12/22: Pt reports improved mood and anxiety control with Viibryd so far, but some continued insomnia and anxiety control. She understand the goal will be to get enough anxiety control to stop clonazepam, but until that occurs she would like to increase clonazepam due to some breakthrough anxiety. She is not concerned about dependence because she took 1mg TID in the past to improve anxiety and was able to stop it without difficulty.  -Denies depression concerns, SI/HI, or AVH.    6/23/22: Pt says she has improved on the clonazepam 3 times daily and her  has noticed a big difference in her as well. She says she feels like she could react poorly to situations and it helps her not do that, along with the Viibryd. She is willing to go up on dose of Viibryd but not add a mood stabilizing medication because she would rather decrease medications instead of add them. She is hoping botox will help her migraines enough to " not need amitriptyline, and another treatment for her hives will help her not need hydroxyzine which she is only taking once daily now. Clonazepam isn't being taken 3 times daily every day, but is being taking 3 times daily frequently, and at least 2 times daily. She says she got a promotion at work, and it has added some more stress, but she is handling it well. Willing to continue the current regimen a couple more months before increasing viibryd since increasing further could cause tyrone/hypomania if mood instability is a problem for her. Melatonin is currently helpful for insomnia. Denies feeling unusually elevated, having SI/HI or AVH.    22: Patient reports that her daughter got  since the last time we saw each other, and her other daughter is competing in [employer] [place] and is on her homecoming court.  Unfortunately, her other daughter who is now in pharmacy school to become a pharmacist lost her partner to suicide recently, and this is because a lot of stress reaction and the patient for her daughter and for her daughter's partner who hung herself.  Before that incident the patient felt she was doing really well on Viibryd without concerns for side effects, and felt the clonazepam helps her anxiety stay under control.  She rarely uses the hydroxyzine, because of dry mouth and sedation, but has been using it more recently to attempt to have more anxiety benefit and sleep.  She reports she has been dreaming a lot more and sleep has been poor or not present.  She feels her daughter is doing all the right things, seeing counseling, getting involved in community on campus to help her through this time of losing her partner of 6 years.  Patient reports a lot of feelings of anger towards the  along with sadness, which is validated due to the stress reaction that it is caused.  The patient would like something for her sleep, but does not want anything addictive like Ambien.  Feels the  "hydroxyzine gives her dry mouth.  Denies AVH or SI/HI.    The following portions of the patient's history were reviewed and updated as appropriate: allergies, current medications, past family history, past medical history, past social history, past surgical history and problem list.    PAST OUTPATIENT TREATMENT  Past Psychiatric History:  Diagnoses: Anxiety/Panic, Affective/Bipolar.  Previous Psychiatrist: Needs clarified.  Therapist: Needs clarified.  Self Harm: Needs clarified.  SA: Needs clarified.  Psychiatric Hospitalizations: Needs clarified.   Psychosis, Anxiety, Depression: Needs clarified.  Medication Trials:  Xanax - effective  Klonopin - effective in the past  Buspirone - ineffective after 2-3 months at uncertain dose.  Hydroxyzine - ineffective at 10mg  Amitriptyline  Lexapro - ineffective  Wellbutrin - ineffective  Pristiq - made her \"too happy,\" didn't like how it made her feel hot like BP increased.  Sequelae Of Mental Disorder:  emotional distress    Interval History  Improved on increasing clonazepam dose.    Side Effects  Denied    Allergy:   Allergies   Allergen Reactions   • Penicillins Other (See Comments)     Childhood reaction: reaction unknown, but patient was hospitalized as a result.     • Iodinated Diagnostic Agents Hives     Per states she is not allergic to iodinated diagnostic agents.       Discontinued Medications:  Medications Discontinued During This Encounter   Medication Reason   • metoclopramide (REGLAN) 10 MG tablet *Therapy completed     Vital Signs:   /94   Pulse 70   Wt 82.1 kg (181 lb)   SpO2 98%   BMI 35.35 kg/m²    Wt Readings from Last 5 Encounters:   22 82.1 kg (181 lb)   22 83.6 kg (184 lb 6.4 oz)   22 81.3 kg (179 lb 3.2 oz)   22 77 kg (169 lb 12.8 oz)   22 76.7 kg (169 lb 3.2 oz)     BP Readings from Last 5 Encounters:   22 152/94   22 143/87   22 146/88   22 137/88   22 127/81     Pulse " Readings from Last 5 Encounters:   09/27/22 70   08/05/22 70   06/23/22 88   04/12/22 70   03/21/22 70     SpO2 Readings from Last 5 Encounters:   09/27/22 98%   08/05/22 97%   06/23/22 97%   04/12/22 98%   03/21/22 98%     Mental Status Exam:   Orientation:  To person, Place, Time and Situation  Memory: Recent and remote memory Intact  Mood/Affect: Full affect  Hopelessness: Denies  Suicidal Ideations: None  Homicidal Ideations:  None  Hallucinations: None  Delusions:  None  Obsessions: None  Behavior and Psychomotor Activity: Normal  Speech:  Normal  Thought Process: Goal directed and linear  Thought Content: Normal  Associations: Intact  Language: name objects and repeat phrases  Concentration and computation: Fair  Attention Span: Fair  Fund of Knowledge: Fair  Reliability: Good  Insight into mental health: Fair  Judgement: Fair  Impulse Control: Good  Hygiene: good  Cooperation:  Cooperative  Eye Contact: Good  Physical/Medical Issues:  Yes HTN, HLD, hx brain aneurysms    MSE reviewed and accepted with changes from the previous visit.    PHQ-9 Depression Screening  Little interest or pleasure in doing things? 1-->several days   Feeling down, depressed, or hopeless? 1-->several days (had some things happen)   Trouble falling or staying asleep, or sleeping too much? 1-->several days   Feeling tired or having little energy? 1-->several days   Poor appetite or overeating? 0-->not at all   Feeling bad about yourself - or that you are a failure or have let yourself or your family down? 1-->several days   Trouble concentrating on things, such as reading the newspaper or watching television? 1-->several days   Moving or speaking so slowly that other people could have noticed? Or the opposite - being so fidgety or restless that you have been moving around a lot more than usual? 0-->not at all   Thoughts that you would be better off dead, or of hurting yourself in some way? 0-->not at all   PHQ-9 Total Score 6   If you  checked off any problems, how difficult have these problems made it for you to do your work, take care of things at home, or get along with other people? somewhat difficult     Feeling nervous, anxious or on edge: Several days  Not being able to stop or control worrying: Several days  Worrying too much about different things: Several days  Trouble Relaxing: Several days  Being so restless that it is hard to sit still: Not at all  Feeling afraid as if something awful might happen: Several days  Becoming easily annoyed or irritable: Several days  JIMENA 7 Total Score: 6  If you checked any problems, how difficult have these problems made it for you to do your work, take care of things at home, or get along with other people: Somewhat difficult  PHQ-9: 6; mild depression. Previously: 1, 0, 13.  JIMENA-7: 6; mild anxiety. Previously: 5, 6, 19.  MDQ: Positive; likely bipolar w/ 9 sxs on MDQ. (4/12/22)    Current every day smoker less than 3 minutes spent counseling Not agreeable to stopping  I advised Ally of the risks of tobacco use.     Lab Results: Reviewed.  CMP    CMP 3/30/22   Glucose 85   BUN 9   Creatinine 0.64   Sodium 137   Potassium 3.8   Chloride 99   Calcium 9.9   Albumin 4.80   Total Bilirubin 0.4   Alkaline Phosphatase 77   AST (SGOT) 33 (A)   ALT (SGPT) 38 (A)   (A) Abnormal value            CBC    CBC 3/4/22 3/30/22   WBC 11.37 (A) 10.80   RBC 4.13 4.26   Hemoglobin 13.0 13.6   Hematocrit 39.1 40.3   MCV 94.7 94.6   MCH 31.5 31.9   MCHC 33.2 33.7   RDW 12.9 13.1   Platelets 368 276   (A) Abnormal value            Lipid Panel    Lipid Panel 3/30/22   Total Cholesterol 193   Triglycerides 127   HDL Cholesterol 39 (A)   VLDL Cholesterol 23   LDL Cholesterol  131 (A)   LDL/HDL Ratio 3.30   (A) Abnormal value            TSH    TSH 3/30/22   TSH 1.510                Assessment & Plan   Diagnoses and all orders for this visit:    1. Insomnia due to other mental disorder (Primary)  -     QUEtiapine (SEROquel) 50 MG  tablet; Take 1 tablet by mouth Every Night.  Dispense: 30 tablet; Refill: 2    2. Severe anxiety with panic  -     QUEtiapine (SEROquel) 50 MG tablet; Take 1 tablet by mouth Every Night.  Dispense: 30 tablet; Refill: 2    3. Major depressive disorder with single episode, in full remission (HCC)  -     QUEtiapine (SEROquel) 50 MG tablet; Take 1 tablet by mouth Every Night.  Dispense: 30 tablet; Refill: 2    PHQ-9: 6; mild depression. Previously: 1, 0, 13.  JIMENA-7: 6; mild anxiety. Previously: 5, 6, 19.  MDQ: Positive; likely bipolar w/ 9 sxs on MDQ. (4/12/22)  -Not due for refills of clonazepam and Viibryd at this time.  -Start 50 mg Seroquel nightly for anxiety, depression, and insomnia.  Attempt removal later as appropriate.  -Cont. 0.5mg clonazepam TID for anxiety and panic control.  -Cont 25mg Hydroxyzine TID PRN for anxiety control. Pt currently taking 1 daily if needed for hives.  -Cont. 20mg Viibryd daily due to noted anx/dep improvement. Consider increase in the future to help decrease clonazepam use and risk of dependence and addiction long-term.  -Counseling continues to be encouraged for stability.  -F/U in 6 months, determine benefit of Seroquel for insomnia, amount of continued use, if clonazepam continues to be used consistently, and how much hydroxyzine seems to be used. Monitor wt/bp/hr and see if counseling acquired.    In future:  -Reconsider [facility]  -r/o cyclothymia/bipolar    Visit Diagnoses:    ICD-10-CM ICD-9-CM   1. Insomnia due to other mental disorder  F51.05 300.9    F99 327.02   2. Severe anxiety with panic  F41.0 300.01   3. Major depressive disorder with single episode, in full remission (HCC)  F32.5 296.26     TREATMENT PLAN/GOALS: In the short-term, the patient is to continue supportive psychotherapy efforts and medications as indicated. Treatment and medication options were discussed during today's visit. Long-term the goal will be to control symptoms so they do not negatively  interfere with other aspects of the patient's life. Prognosis is optimistic with implementation of the current treatment plan. Patient ackowledged and verbally consented to the treatment plan and was educated on the importance of compliance with treatment and follow-up appointments.    MEDICATION ISSUES:  INSPECT reviewed 9/27/2022, and is as expected. Taking 0.5mg clonazepam (Q:90).  Discussed medication options and treatment plan of prescribed medication as well as the risks, benefits, and side effects including potential falls, possible impaired driving, and metabolic adversities among others. Patient counseled on a multimodal approach with healthy nutrition, healthy sleep, regular physical activity, social activity, counseling, and medication taking part in his/her psychiatric management. Patient is agreeable to the plan, and he/she agreed to call the office with any worsening of symptoms or onset of side effects. Patient is agreeable to call 911 or go to the nearest ER should he/she begin having SI/HI with plans or anticipated actions or self-harming behavior.  Assisted patient in processing above session content; acknowledged and normalized patient’s thoughts, feelings, and concerns. Reviewed positive coping skills and behavior management in session with positive framing of thoughts. Allowed patient to freely discuss issues without interruption or judgment. Provided safe, confidential environment to facilitate the development of positive therapeutic relationship and encourage open and honest communication.    MEDS ORDERED DURING VISIT:  New Medications Ordered This Visit   Medications   • QUEtiapine (SEROquel) 50 MG tablet     Sig: Take 1 tablet by mouth Every Night.     Dispense:  30 tablet     Refill:  2     Return in about 6 months (around 3/27/2023) for Recheck.     This document has been electronically signed by Diogenes Siegel PA-C  September 27, 2022 16:30 EDT    EMR Dragon transcription  disclaimer:  Part of this note may be an electronic transcription/translation of spoken language to printed text using the Dragon Dictation System.

## 2022-09-30 DIAGNOSIS — F41.0 SEVERE ANXIETY WITH PANIC: Chronic | ICD-10-CM

## 2022-10-02 RX ORDER — CLONAZEPAM 0.5 MG/1
TABLET ORAL
Qty: 90 TABLET | OUTPATIENT
Start: 2022-10-02

## 2022-10-10 ENCOUNTER — TELEPHONE (OUTPATIENT)
Dept: FAMILY MEDICINE CLINIC | Facility: CLINIC | Age: 42
End: 2022-10-10

## 2022-10-10 NOTE — TELEPHONE ENCOUNTER
----- Message from Erlinda Saleh MD sent at 10/10/2022  3:16 PM EDT -----  Labs sent over from [facility] showed white blood cell count to be elevated if you have any signs of infection may wish to get rechecked here in case they did not start you on any antibiotics.  Magnesium was also low may want to increase that over-the-counter call if any other questions or concerns

## 2022-10-10 NOTE — TELEPHONE ENCOUNTER
-HUB TO READ:  ---- Message from Erlinda Saleh MD sent at 10/10/2022  3:16 PM EDT -----  Labs sent over from [facility] showed white blood cell count to be elevated if you have any signs of infection may wish to get rechecked here in case they did not start you on any antibiotics.  Magnesium was also low may want to increase that over-the-counter call if any other questions or concerns

## 2022-10-24 DIAGNOSIS — F41.0 SEVERE ANXIETY WITH PANIC: Chronic | ICD-10-CM

## 2022-10-24 NOTE — TELEPHONE ENCOUNTER
Refill clonazepam    Inspect ran  Last fill 9/30/22    Last appt 9/27/22  Next 3/27/23 canceled (evi)

## 2022-10-25 RX ORDER — CLONAZEPAM 0.5 MG/1
0.5 TABLET ORAL 3 TIMES DAILY PRN
Qty: 90 TABLET | Refills: 1 | Status: SHIPPED | OUTPATIENT
Start: 2022-10-25 | End: 2022-10-28

## 2022-10-27 DIAGNOSIS — F41.0 SEVERE ANXIETY WITH PANIC: Chronic | ICD-10-CM

## 2022-10-27 NOTE — TELEPHONE ENCOUNTER
Rx Refill Note  Requested Prescriptions     Pending Prescriptions Disp Refills   • clonazePAM (KlonoPIN) 0.5 MG tablet [Pharmacy Med Name: CLONAZEPAM 0.5MG TABLETS] 90 tablet      Sig: TAKE 1 TABLET BY MOUTH THREE TIMES DAILY AS NEEDED FOR ANXIETY      Last office visit with prescribing clinician: Visit date not found      Next office visit with prescribing clinician: Visit date not found   Office Visit with Diogenes Siegel PA-C (09/27/2022)           Kaylin Solis MA  10/27/22, 14:50 EDT     Inspect printed; last fill 9-30

## 2022-10-28 RX ORDER — CLONAZEPAM 0.5 MG/1
TABLET ORAL
Qty: 90 TABLET | Refills: 0 | Status: SHIPPED | OUTPATIENT
Start: 2022-10-28 | End: 2022-12-06 | Stop reason: SDUPTHER

## 2022-11-06 DIAGNOSIS — F32.1 CURRENT MODERATE EPISODE OF MAJOR DEPRESSIVE DISORDER WITHOUT PRIOR EPISODE: ICD-10-CM

## 2022-11-06 DIAGNOSIS — F41.0 SEVERE ANXIETY WITH PANIC: Chronic | ICD-10-CM

## 2022-11-06 DIAGNOSIS — G43.109 MIGRAINE WITH AURA AND WITHOUT STATUS MIGRAINOSUS, NOT INTRACTABLE: ICD-10-CM

## 2022-11-06 RX ORDER — AMITRIPTYLINE HYDROCHLORIDE 10 MG/1
10 TABLET, FILM COATED ORAL NIGHTLY
Qty: 30 TABLET | Refills: 2 | Status: SHIPPED | OUTPATIENT
Start: 2022-11-06 | End: 2023-01-30

## 2022-11-06 RX ORDER — ATORVASTATIN CALCIUM 20 MG/1
20 TABLET, FILM COATED ORAL DAILY
Qty: 90 TABLET | Refills: 3 | Status: SHIPPED | OUTPATIENT
Start: 2022-11-06

## 2022-11-10 ENCOUNTER — CLINICAL SUPPORT (OUTPATIENT)
Dept: PSYCHIATRY | Facility: CLINIC | Age: 42
End: 2022-11-10

## 2022-11-10 DIAGNOSIS — F41.0 SEVERE ANXIETY WITH PANIC: Chronic | ICD-10-CM

## 2022-11-10 DIAGNOSIS — F32.5 MAJOR DEPRESSIVE DISORDER WITH SINGLE EPISODE, IN FULL REMISSION: Primary | Chronic | ICD-10-CM

## 2022-12-06 DIAGNOSIS — F41.0 SEVERE ANXIETY WITH PANIC: Chronic | ICD-10-CM

## 2022-12-06 RX ORDER — CLONAZEPAM 0.5 MG/1
0.5 TABLET ORAL 3 TIMES DAILY PRN
Qty: 90 TABLET | Refills: 0 | Status: SHIPPED | OUTPATIENT
Start: 2022-12-06 | End: 2023-01-12 | Stop reason: SDUPTHER

## 2022-12-06 RX ORDER — CLONAZEPAM 0.5 MG/1
0.5 TABLET ORAL 3 TIMES DAILY PRN
Qty: 90 TABLET | Refills: 0 | Status: SHIPPED | OUTPATIENT
Start: 2022-12-06 | End: 2022-12-06

## 2022-12-06 NOTE — TELEPHONE ENCOUNTER
Rx Refill Note  Requested Prescriptions     Pending Prescriptions Disp Refills   • clonazePAM (KlonoPIN) 0.5 MG tablet 90 tablet 0     Sig: Take 1 tablet by mouth 3 (Three) Times a Day As Needed. for anxiety      Last office visit with prescribing clinician: Visit date not found     Next office visit with prescribing clinician: 3/27/23 canjenise (evi)     Office Visit with Evi Siegel PA-C (09/27/2022)    SCANNED - LABS (11/10/2022)    Jeimy Mazariegos  12/06/22, 13:19 EST     Inspect fill 11/20/22  She knows she is early but she has some serious things going on and afraid she will not call in time for a refill without running out and she doesn't want to do that with what is going on.  The pharmacy will hold it till time to fill. Please.

## 2022-12-06 NOTE — TELEPHONE ENCOUNTER
It was a one time thing. They had just had their first grandchild, after several miscarriages. I remember her telling me when she was in for her UDS as soon as she said it today.

## 2023-01-12 DIAGNOSIS — F41.0 SEVERE ANXIETY WITH PANIC: Chronic | ICD-10-CM

## 2023-01-12 RX ORDER — CLONAZEPAM 0.5 MG/1
0.5 TABLET ORAL 3 TIMES DAILY PRN
Qty: 90 TABLET | Refills: 0 | Status: SHIPPED | OUTPATIENT
Start: 2023-01-12 | End: 2023-01-17 | Stop reason: SDUPTHER

## 2023-01-12 NOTE — TELEPHONE ENCOUNTER
Rx Refill Note  Requested Prescriptions     Pending Prescriptions Disp Refills   • clonazePAM (KlonoPIN) 0.5 MG tablet 90 tablet 0     Sig: Take 1 tablet by mouth 3 (Three) Times a Day As Needed for Anxiety.      Last office visit with prescribing clinician: Visit date not found     Next office visit with prescribing clinician: 03/27/23 junior (evi)    Office Visit with Evi Siegel PA-C (09/27/2022)    SCANNED - LABS (11/10/2022)    Inspect fill 12/19/22     Jeimy Mazariegos  01/12/23, 16:09 EST

## 2023-01-17 DIAGNOSIS — F41.0 SEVERE ANXIETY WITH PANIC: Chronic | ICD-10-CM

## 2023-01-17 RX ORDER — CLONAZEPAM 0.5 MG/1
TABLET ORAL
Qty: 90 TABLET | OUTPATIENT
Start: 2023-01-17

## 2023-01-17 RX ORDER — CLONAZEPAM 0.5 MG/1
0.5 TABLET ORAL 3 TIMES DAILY PRN
Qty: 90 TABLET | Refills: 0 | Status: SHIPPED | OUTPATIENT
Start: 2023-01-17 | End: 2023-02-13 | Stop reason: SDUPTHER

## 2023-01-17 NOTE — TELEPHONE ENCOUNTER
Rx Refill Note  Requested Prescriptions     Pending Prescriptions Disp Refills   • clonazePAM (KlonoPIN) 0.5 MG tablet 90 tablet 0     Sig: Take 1 tablet by mouth 3 (Three) Times a Day As Needed for Anxiety.      Last office visit with prescribing clinician: Visit date not found     Next office visit with prescribing clinician: 2/14/2023     Inspect 12-    SCANNED - LABS (11/10/2022)    Progress Notes by Diogenes Siegel PA-C (09/27/2022 15:45)  {  Swapna Mauricio MA  01/17/23, 15:09 EST:

## 2023-01-24 DIAGNOSIS — F41.0 SEVERE ANXIETY WITH PANIC: Chronic | ICD-10-CM

## 2023-01-24 RX ORDER — VILAZODONE HYDROCHLORIDE 20 MG/1
20 TABLET ORAL DAILY
Qty: 90 TABLET | Refills: 0 | Status: SHIPPED | OUTPATIENT
Start: 2023-01-24 | End: 2023-02-13 | Stop reason: SDUPTHER

## 2023-01-30 DIAGNOSIS — K21.9 GASTROESOPHAGEAL REFLUX DISEASE, UNSPECIFIED WHETHER ESOPHAGITIS PRESENT: ICD-10-CM

## 2023-01-30 DIAGNOSIS — F41.0 SEVERE ANXIETY WITH PANIC: Chronic | ICD-10-CM

## 2023-01-30 DIAGNOSIS — G43.109 MIGRAINE WITH AURA AND WITHOUT STATUS MIGRAINOSUS, NOT INTRACTABLE: ICD-10-CM

## 2023-01-30 DIAGNOSIS — F32.1 CURRENT MODERATE EPISODE OF MAJOR DEPRESSIVE DISORDER WITHOUT PRIOR EPISODE: ICD-10-CM

## 2023-01-30 RX ORDER — FAMOTIDINE 20 MG/1
20 TABLET, FILM COATED ORAL DAILY
Qty: 30 TABLET | Refills: 6 | Status: SHIPPED | OUTPATIENT
Start: 2023-01-30

## 2023-01-30 RX ORDER — FAMOTIDINE 20 MG/1
20 TABLET, FILM COATED ORAL DAILY
Qty: 30 TABLET | Refills: 6 | Status: SHIPPED | OUTPATIENT
Start: 2023-01-30 | End: 2023-01-30

## 2023-01-30 RX ORDER — AMITRIPTYLINE HYDROCHLORIDE 10 MG/1
10 TABLET, FILM COATED ORAL NIGHTLY
Qty: 30 TABLET | Refills: 2 | Status: SHIPPED | OUTPATIENT
Start: 2023-01-30

## 2023-02-02 ENCOUNTER — OFFICE VISIT (OUTPATIENT)
Dept: PSYCHIATRY | Facility: CLINIC | Age: 43
End: 2023-02-02
Payer: COMMERCIAL

## 2023-02-02 DIAGNOSIS — F33.0 MILD EPISODE OF RECURRENT MAJOR DEPRESSIVE DISORDER: Primary | ICD-10-CM

## 2023-02-02 DIAGNOSIS — F41.1 GENERALIZED ANXIETY DISORDER: ICD-10-CM

## 2023-02-02 PROCEDURE — 90791 PSYCH DIAGNOSTIC EVALUATION: CPT | Performed by: SOCIAL WORKER
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
## 2023-02-02 NOTE — PSYCHOTHERAPY NOTE
"Case Management/ Note    Patient Name: Ally Condon  YOB: 1980  MRN #: [id number]    Ally is pleasant alert and oriented to person place and time.  She does not want her psychiatric information in the main note.  She states that she has a long history of depression and that she is \"not happy\".  She adds that she is quite overwhelmed especially regarding her daughter Jarred and with her own health.  She states that her daughter Jarred's girlfriend killed herself and while she did not particularly like Keisha she has had her on her mind, and and worries about Jarred to the degree that she knows is not healthy.  She states that she argues with her  a lot as she feels very overwhelmed at home.  She is facing legal problems with operating while intoxicated and may be facing 6 months probation.    Electronically signed by:   Seema Figueroa LCSW  02/02/23, 16:36 EST        "

## 2023-02-07 RX ORDER — ALBUTEROL SULFATE 90 UG/1
AEROSOL, METERED RESPIRATORY (INHALATION)
Qty: 8.5 G | Refills: 0 | Status: SHIPPED | OUTPATIENT
Start: 2023-02-07

## 2023-02-13 ENCOUNTER — TELEMEDICINE (OUTPATIENT)
Dept: PSYCHIATRY | Facility: CLINIC | Age: 43
End: 2023-02-13
Payer: COMMERCIAL

## 2023-02-13 DIAGNOSIS — F41.0 SEVERE ANXIETY WITH PANIC: Chronic | ICD-10-CM

## 2023-02-13 DIAGNOSIS — F33.1 MAJOR DEPRESSIVE DISORDER, RECURRENT EPISODE, MODERATE: ICD-10-CM

## 2023-02-13 DIAGNOSIS — F41.1 GENERALIZED ANXIETY DISORDER: ICD-10-CM

## 2023-02-13 DIAGNOSIS — F33.0 MILD EPISODE OF RECURRENT MAJOR DEPRESSIVE DISORDER: Primary | ICD-10-CM

## 2023-02-13 PROCEDURE — 99214 OFFICE O/P EST MOD 30 MIN: CPT

## 2023-02-13 RX ORDER — VILAZODONE HYDROCHLORIDE 20 MG/1
20 TABLET ORAL DAILY
Qty: 90 TABLET | Refills: 1 | Status: SHIPPED | OUTPATIENT
Start: 2023-02-13

## 2023-02-13 RX ORDER — CLONAZEPAM 0.5 MG/1
0.5 TABLET ORAL 3 TIMES DAILY PRN
Qty: 90 TABLET | Refills: 2 | Status: SHIPPED | OUTPATIENT
Start: 2023-02-13 | End: 2023-03-13 | Stop reason: SDUPTHER

## 2023-02-13 NOTE — PROGRESS NOTES
Subjective   Ally Condon is a 42 y.o. female who presents today for follow-up for psychiatric medication management. Patient is new to this provider, but previously saw REJI Flores.     Chief Complaint:  Follow up for depression, anxiety, and insomnia. Patient is also establishing care with this provider.     This provider is located in [place], [place] using a secure ATG Access Video Visit through [employer]. Patient is being seen remotely via telehealth at their home address in [place], and stated they are in a secure environment for this session. The patient's condition being diagnosed/treated is appropriate for telemedicine. The provider identified herself as well as her credentials.   The patient, and/or patients guardian, consent to be seen remotely, and when consent is given they understand that the consent allows for patient identifiable information to be sent to a third party as needed.   They may refuse to be seen remotely at any time. The electronic data is encrypted and password protected, and the patient and/or guardian has been advised of the potential risks to privacy not withstanding such measures.   PT Identifiers used: Name and .     You have chosen to receive care through a telehealth visit.  Do you consent to use a video/audio connection for your medical care today? Yes    History of Present Illness:     Patient last saw WHITNEY Siegel on 22. At that time, she was taking 20mg Viibryd, 25mg hydroxyzine TID PRN, 0.5mg clonazepam TID PRN anxiety, and seroquel 50mg at night.     At today's visit, the patient is completing her appointment by [employer], due to my needing to reschedule her appointment. So I appreciate her being flexible.   She states she has not been taking the seroquel at night. She hasn't needed it.   She has been taking 20mg of Viibryd   Takes hydroxyzine for hives. It is not prescribed by us but it does help some for anxiety as well.   Takes clonazepam TID as  "needed. Takes it three times a day everyday.    Recently started seeing Seema.   She has had one visit. She feels like she unloaded on her on the first visit.   She has a couple appointments scheduled with her. She thinks this is going to be going well.   No suiciidal thoughts.  Sleeping ok, sleeps about 5 hours a night.   Stays busy.   Daughter's girlfriend of 6 years, committed suicide in August. She was only 20. This has been hard for her.    Otherwise she is doing ok. Wants to keep medications the same.     Ability and capacity to respond to treatment: good  Functional status: good  Prognosis: good  Long term goals: improve depression and overall quality of life.   Short term goals:improve sleep.   Strengths: Patient is compliant with appointments and medications.    Weaknesses: Patient can be self-critical at times.     Patient presents with symptoms and behaviors that are consistent with the following DSM-5 diagnoses:  1. Major depressive disorder  2. Generalized anxiety disorder  3. Panic disorder  4. Insomnia    The following portions of the patient's history were reviewed and updated as appropriate: allergies, current medications, past family history, past medical history, past social history, past surgical history and problem list.    PAST OUTPATIENT TREATMENT  Diagnosis treated:  Affective Disorder, Anxiety/Panic Disorder  Treatment Type:  Medication Management  Prior Psychiatric Medications:  Xanax - effective  Klonopin - effective in the past  Buspirone - ineffective after 2-3 months at uncertain dose.  Hydroxyzine - ineffective at 10mg  Amitriptyline  Lexapro - ineffective  Wellbutrin - ineffective  Pristiq - made her \"too happy,\" didn't like how it made her feel hot like BP increased.  Support Groups:  None  Sequelae Of Mental Disorder:  emotional distress    Interval History  Improved    Side Effects  None      Past Medical History:  Past Medical History:   Diagnosis Date   • Abnormal ECG 08/2021 "   • Aneurysm of left internal carotid artery 2012   • Anxiety    • Depression    • Generalized anxiety disorder 2023   • Heart murmur    • Hypertension    • Other hyperlipidemia    • Right internal carotid artery aneurysm 2014   • Unilateral emphysema (HCC)        Social History:  Social History     Socioeconomic History   • Marital status:    Tobacco Use   • Smoking status: Every Day     Packs/day: 0.50     Years: 20.00     Pack years: 10.00     Types: Cigarettes   • Smokeless tobacco: Never   Vaping Use   • Vaping Use: Never used   Substance and Sexual Activity   • Alcohol use: Yes     Comment: occassionally   • Drug use: No   • Sexual activity: Yes     Partners: Male     Birth control/protection: None, Tubal ligation       Family History:  Family History   Problem Relation Age of Onset   • Anxiety disorder Maternal Grandmother        Past Surgical History:  Past Surgical History:   Procedure Laterality Date   • APPENDECTOMY     • ARTERIAL ANEURYSM REPAIR      Brain   • BRAIN SURGERY     • CEREBRAL ANGIOGRAM Right 2022    [facility]   •  SECTION      x2   • CHOLECYSTECTOMY     • COLON SURGERY     • COLONOSCOPY     • SMALL INTESTINE SURGERY     • TONSILLECTOMY         Problem List:  Patient Active Problem List   Diagnosis   • Aneurysm of left internal carotid artery   • Right internal carotid artery aneurysm   • Body mass index (BMI) of 24.0-24.9 in adult   • Contact allergic reaction   • Headache   • Hidradenitis suppurativa   • Hx of pancreatitis   • Migraine   • Tobacco abuse   • Urticaria   • Severe anxiety with panic   • Unilateral emphysema (HCC)   • Right groin pain   • Obesity   • Class 1 obesity due to excess calories with serious comorbidity and body mass index (BMI) of 33.0 to 33.9 in adult   • Hypertension   • Other hyperlipidemia   • Right elbow pain   • Major depressive disorder with single episode, in full remission (HCC)   • Intractable right  heel pain   • Insomnia due to other mental disorder   • Generalized anxiety disorder       Allergy:   Allergies   Allergen Reactions   • Penicillins Other (See Comments)     Childhood reaction: reaction unknown, but patient was hospitalized as a result.     • Iodinated Contrast Media Hives     Per states she is not allergic to iodinated diagnostic agents.         Discontinued Medications:  Medications Discontinued During This Encounter   Medication Reason   • clonazePAM (KlonoPIN) 0.5 MG tablet Reorder   • vilazodone (VIIBRYD) 20 MG tablet tablet Reorder       Current Medications:   Current Outpatient Medications   Medication Sig Dispense Refill   • clonazePAM (KlonoPIN) 0.5 MG tablet Take 1 tablet by mouth 3 (Three) Times a Day As Needed for Anxiety. 90 tablet 2   • vilazodone (VIIBRYD) 20 MG tablet tablet Take 1 tablet by mouth Daily. 90 tablet 1   • Acetaminophen (Tylenol) 325 MG capsule Take  by mouth.     • adapalene (DIFFERIN) 0.3 % gel APPLY TOPICALLY TO THE AFFECTED AREA EVERY MORNING     • albuterol sulfate  (90 Base) MCG/ACT inhaler INHALE 2 PUFFS EVERY 4 HOURS AS NEEDED FOR WHEEZING 8.5 g 0   • amitriptyline (ELAVIL) 10 MG tablet TAKE 1 TABLET BY MOUTH EVERY NIGHT 30 tablet 2   • aspirin 81 MG chewable tablet Chew 81 mg Daily.     • atorvastatin (LIPITOR) 20 MG tablet TAKE 1 TABLET BY MOUTH DAILY 90 tablet 3   • cetirizine (zyrTEC) 10 MG tablet TAKE 1 TABLET BY MOUTH EVERY DAY 90 tablet 3   • clopidogrel (PLAVIX) 75 MG tablet TAKE 1 TABLET BY MOUTH DAILY 90 tablet 1   • famotidine (PEPCID) 20 MG tablet TAKE 1 TABLET BY MOUTH DAILY 30 tablet 6   • hydrOXYzine (ATARAX) 25 MG tablet TAKE 1 TABLET BY MOUTH THREE TIMES DAILY AS NEEDED FOR ANXIETY 90 tablet 1   • Melatonin 10 MG tablet Take 10 mg by mouth Every Night.     • metoprolol tartrate (LOPRESSOR) 25 MG tablet TAKE 1 TABLET BY MOUTH TWICE DAILY 180 tablet 3   • ondansetron (Zofran) 4 MG tablet Take 1 tablet by mouth Every 8 (Eight) Hours As  Needed for Nausea or Vomiting. 30 tablet 1   • prochlorperazine (COMPAZINE) 10 MG tablet TAKE 1 TABLET BY MOUTH TWICE DAILY AS NEEDED FOR HEADACHE     • QUEtiapine (SEROquel) 50 MG tablet Take 1 tablet by mouth Every Night. 30 tablet 2   • tolterodine LA (DETROL LA) 2 MG 24 hr capsule Take 2 mg by mouth 2 (Two) Times a Day.     • tretinoin (RETIN-A) 0.1 % cream APPLY PEA SIZED AMOUNT TOPICALLY TO THE AFFECTED AREA AT BEDTIME       No current facility-administered medications for this visit.         Psychological ROS: positive for - anxiety, depression and sleep disturbances  negative for - behavioral disorder, concentration difficulties, decreased libido, disorientation, hallucinations, hostility, irritability, memory difficulties, mood swings, obsessive thoughts, physical abuse, sexual abuse or suicidal ideation      Physical Exam:   not currently breastfeeding.    Mental Status Exam:   Hygiene:   good  Cooperation:  Cooperative  Eye Contact:  Good  Psychomotor Behavior:  Appropriate  Affect:  Appropriate  Mood: euthymic  Hopelessness: Denies  Speech:  Normal  Thought Process:  Goal directed and Linear  Thought Content:  Normal  Suicidal:  None  Homicidal:  None  Hallucinations:  None  Delusion:  None  Memory:  Intact  Orientation:  Person, Place, Time and Situation  Reliability:  good  Insight:  Good  Judgement:  Good  Impulse Control:  Good  Physical/Medical Issues:  No        PHQ-9 Depression Screening    Little interest or pleasure in doing things? 1-->several days   Feeling down, depressed, or hopeless? 1-->several days   Trouble falling or staying asleep, or sleeping too much? 1-->several days   Feeling tired or having little energy? 1-->several days   Poor appetite or overeating? 0-->not at all   Feeling bad about yourself - or that you are a failure or have let yourself or your family down? 0-->not at all   Trouble concentrating on things, such as reading the newspaper or watching television? 0-->not at all    Moving or speaking so slowly that other people could have noticed? Or the opposite - being so fidgety or restless that you have been moving around a lot more than usual? 0-->not at all   Thoughts that you would be better off dead, or of hurting yourself in some way? 0-->not at all   PHQ-9 Total Score 4   If you checked off any problems, how difficult have these problems made it for you to do your work, take care of things at home, or get along with other people? somewhat difficult        Current every day smoker less than 3 minutes spent counseling Not agreeable to stopping    I advised Ally of the risks of tobacco use.     Result Review:    Labs:  No visits with results within 3 Month(s) from this visit.   Latest known visit with results is:   Office Visit on 08/05/2022   Component Date Value Ref Range Status   • Color 08/05/2022 Yellow  Yellow, Straw, Dark Yellow, Prisca Final   • Clarity, UA 08/05/2022 Clear  Clear Final   • Specific Gravity  08/05/2022 1.015  1.005 - 1.030 Final   • pH, Urine 08/05/2022 6.5  5.0 - 8.0 Final   • Leukocytes 08/05/2022 Negative  Negative Final   • Nitrite, UA 08/05/2022 Negative  Negative Final   • Protein, POC 08/05/2022 Negative  Negative mg/dL Final   • Glucose, UA 08/05/2022 Negative  Negative mg/dL Final   • Ketones, UA 08/05/2022 Negative  Negative Final   • Urobilinogen, UA 08/05/2022 Normal  Normal Final   • Bilirubin 08/05/2022 Negative  Negative Final   • Blood, UA 08/05/2022 Negative  Negative Final   • Lot Number 08/05/2022 [id number]   Final   • Expiration Date 08/05/2022 02/28/2023   Final   • COVID19 08/05/2022 Not Detected  Not Detected - Ref. Range Final       Assessment & Plan   Diagnoses and all orders for this visit:    1. Mild episode of recurrent major depressive disorder (HCC) (Primary)    2. Severe anxiety with panic  -     vilazodone (VIIBRYD) 20 MG tablet tablet; Take 1 tablet by mouth Daily.  Dispense: 90 tablet; Refill: 1  -     clonazePAM (KlonoPIN) 0.5  MG tablet; Take 1 tablet by mouth 3 (Three) Times a Day As Needed for Anxiety.  Dispense: 90 tablet; Refill: 2    3. Major depressive disorder, recurrent episode, moderate (HCC)  -     vilazodone (VIIBRYD) 20 MG tablet tablet; Take 1 tablet by mouth Daily.  Dispense: 90 tablet; Refill: 1    4. Generalized anxiety disorder  -     clonazePAM (KlonoPIN) 0.5 MG tablet; Take 1 tablet by mouth 3 (Three) Times a Day As Needed for Anxiety.  Dispense: 90 tablet; Refill: 2    Continue Vilazodone 20mg daily.   Continue clonazepam 0.5mg TID PRN for anxiety.      Visit Diagnoses:    ICD-10-CM ICD-9-CM   1. Mild episode of recurrent major depressive disorder (HCC)  F33.0 296.31   2. Severe anxiety with panic  F41.0 300.01   3. Major depressive disorder, recurrent episode, moderate (HCC)  F33.1 296.32   4. Generalized anxiety disorder  F41.1 300.02       TREATMENT PLAN/GOALS: Continue supportive psychotherapy efforts and medications as indicated. Treatment and medication options discussed during today's visit. Patient ackowledged and verbally consented to continue with current treatment plan and was educated on the importance of compliance with treatment and follow-up appointments.    MEDICATION ISSUES:  INSPECT reviewed as expected.    UDS on 11/10/22 consistent with prescribed medications.     Discussed medication options and treatment plan of prescribed medication as well as the risks, benefits, and side effects including potential falls, possible impaired driving and metabolic adversities among others. Patient is agreeable to call the office with any worsening of symptoms or onset of side effects. Patient is agreeable to call 911 or go to the nearest ER should he/she begin having SI/HI. No medication side effects or related complaints today.     MEDS ORDERED DURING VISIT:  New Medications Ordered This Visit   Medications   • vilazodone (VIIBRYD) 20 MG tablet tablet     Sig: Take 1 tablet by mouth Daily.     Dispense:  90  tablet     Refill:  1   • clonazePAM (KlonoPIN) 0.5 MG tablet     Sig: Take 1 tablet by mouth 3 (Three) Times a Day As Needed for Anxiety.     Dispense:  90 tablet     Refill:  2       Return in about 3 months (around 5/13/2023).         This document has been electronically signed by JOHANNY Otoole  February 13, 2023 13:38 EST    Part of this note may be an electronic transcription/translation of spoken language to printed text using the Dragon Dictation System.

## 2023-02-22 ENCOUNTER — OFFICE VISIT (OUTPATIENT)
Dept: PSYCHIATRY | Facility: CLINIC | Age: 43
End: 2023-02-22
Payer: COMMERCIAL

## 2023-02-22 DIAGNOSIS — F41.1 GENERALIZED ANXIETY DISORDER: Primary | ICD-10-CM

## 2023-02-22 DIAGNOSIS — F32.5 MAJOR DEPRESSIVE DISORDER WITH SINGLE EPISODE, IN FULL REMISSION: Chronic | ICD-10-CM

## 2023-02-22 PROCEDURE — 90837 PSYTX W PT 60 MINUTES: CPT | Performed by: SOCIAL WORKER

## 2023-02-22 NOTE — PSYCHOTHERAPY NOTE
Case Management/ Note    Patient Name: Ally Condon  YOB: 1980  MRN #: [id number]      She states that her daughter Jarred was in for the weekend, her daughter Sakina had a cheer competition and they celebrated her oldest daughter's birthday.  She spoke about it being a good weekend but that her anxiety was increased with Jarred coming home.  She talked about how she continues to worry about her with her greatest concern being that if her mental health were to decline having concerns of harming herself. However, she also adds that she feels that Jarred would not do this given her feelings of self-harm, related to her girlfriend killing herself.     Electronically signed by:   Seema Figueroa LCSW  02/22/23, 09:12 EST

## 2023-02-22 NOTE — PROGRESS NOTES
Date: February 22, 2023  Time In: 0807  Time Out: 0901      PROGRESS NOTE  Data:  Ally Condon is a 42 y.o. female who presents today for individual therapy session at [facility] with AFTAB Suh, ERIK.     Patient Chief Complaint: follow up for depression and anxiety     Clinical Maneuvering/Intervention: Ally is pleasant, alert and oriented to person, place and time. She spoke of her level of anxiety, and of her concerns with what she is passing on with her kids in their own mental health and mental health care.    Assisted patient in processing above session content; acknowledged and normalized patient’s thoughts, feelings, and concerns. Rationalized patient thought process regarding protective factors that families have. Discussed triggers associated with patient's anxiety. Also discussed coping skills for patient to implement such as 7-11 breathing and guided imagery.  She states during guided imagery that she could only see herself in a blur and was encouraged to imagine the imagery versus seeing the image in her mind's eye.    Allowed patient to freely discuss issues without interruption or judgment. Provided safe, confidential environment to facilitate the development of positive therapeutic relationship and encourage open, honest communication. Assisted patient in identifying risk factors which would indicate the need for higher level of care including thoughts to harm self or others and/or self-harming behavior and encouraged patient to contact this office, call 911, or present to the nearest emergency room should any of these events occur. Discussed crisis intervention services and means to access. Patient adamantly and convincingly denies current suicidal or homicidal ideation or perceptual disturbance.    Assessment   Patient appears to maintain relative stability as compared to their baseline. However, patient continues to struggle with depression and anxiety which  continues to cause impairment in important areas of functioning. A result, they can be reasonably expected to continue to benefit from treatment and would likely be at increased risk for decompensation otherwise.    Mental Status Exam:   Hygiene:   good  Cooperation:  Cooperative  Eye Contact:  Good  Psychomotor Behavior:  Appropriate  Affect:  Appropriate  Mood: anxious  Speech:  Normal  Thought Process:  Goal directed and Linear  Thought Content:  Normal  Suicidal:  None  Homicidal:  None  Hallucinations:  None  Delusion:  None  Memory:  Intact  Orientation:  Person, Place, Time and Situation  Reliability:  good  Insight:  Good  Judgement:  Good  Impulse Control:  Good  Physical/Medical Issues:  see diagnosis list      PHQ-Score Total:  PHQ-9 Total Score: PHQ-9 Depression Screening  Little interest or pleasure in doing things? 1-->several days   Feeling down, depressed, or hopeless? 1-->several days   Trouble falling or staying asleep, or sleeping too much? 2-->more than half the days   Feeling tired or having little energy? 2-->more than half the days   Poor appetite or overeating? 0-->not at all   Feeling bad about yourself - or that you are a failure or have let yourself or your family down? 1-->several days   Trouble concentrating on things, such as reading the newspaper or watching television? 1-->several days   Moving or speaking so slowly that other people could have noticed? Or the opposite - being so fidgety or restless that you have been moving around a lot more than usual? 0-->not at all   Thoughts that you would be better off dead, or of hurting yourself in some way? 0-->not at all   PHQ-9 Total Score 8   If you checked off any problems, how difficult have these problems made it for you to do your work, take care of things at home, or get along with other people?        JIMENA-7 Total Score:   Over the last two weeks, how often have you been bothered by the following problems?  Feeling nervous, anxious or  on edge: More than half the days  Not being able to stop or control worrying: More than half the days  Worrying too much about different things: More than half the days  Trouble Relaxing: More than half the days  Being so restless that it is hard to sit still: More than half the days  Becoming easily annoyed or irritable: More than half the days  Feeling afraid as if something awful might happen: More than half the days  JIMENA 7 Total Score: 14     Patient's Support Network Includes:   and children    Functional Status: Moderate impairment     Progress toward goal: Not at goal    Prognosis: Good with Ongoing Treatment          Plan     Resources: Patient was provided with the following community resources: None at this time     Patient will continue in individual outpatient therapy with focus on improved functioning and coping skills, maintaining stability, and avoiding decompensation and the need for higher level of care.    Patient will adhere to any medication regimens as prescribed and report any side effects. Patient will contact this office, call 911 or present to the nearest emergency room should suicidal or homicidal ideations occur. Provide cognitive behavioral therapy and solution focused therapy to improve functioning, maintain stability and avoid decompensation and the need for higher level of care.     Return in about 3  weeks, or earlier if symptoms worsen or fail to improve.           VISIT DIAGNOSIS:     ICD-10-CM ICD-9-CM   1. Generalized anxiety disorder  F41.1 300.02   2. Major depressive disorder with single episode, in full remission ([facility])  F32.5 296.26            This document has been electronically signed by AFTAB Suh, ERIK   February 22, 2023 09:04 EST      Part of this note may be an electronic transcription/translation of spoken language to printed text using the Dragon Dictation System.

## 2023-03-08 ENCOUNTER — OFFICE VISIT (OUTPATIENT)
Dept: PSYCHIATRY | Facility: CLINIC | Age: 43
End: 2023-03-08
Payer: COMMERCIAL

## 2023-03-08 DIAGNOSIS — F41.1 GENERALIZED ANXIETY DISORDER: Primary | ICD-10-CM

## 2023-03-08 DIAGNOSIS — F32.5 MAJOR DEPRESSIVE DISORDER WITH SINGLE EPISODE, IN FULL REMISSION: Chronic | ICD-10-CM

## 2023-03-08 PROCEDURE — 90837 PSYTX W PT 60 MINUTES: CPT | Performed by: SOCIAL WORKER

## 2023-03-08 NOTE — PROGRESS NOTES
"Date: March 8, 2023  Time In: 1516  Time Out: 1620      PROGRESS NOTE  Data:  Ally Condon is a 42 y.o. female who presents today for individual therapy session at [facility] with AFTAB Suh, ERIK.     Patient Chief Complaint: Follow up for depression and anxiety    Clinical Maneuvering/Intervention: Ally is pleasant, alert and oriented to person place and time.  She spoke about the possibility of going on vacation with just her and her  and the anxiety that she has and not having her children with her. We discussed the importance of spouses having that time along with one another and advantages and disadvantages of vacationing with the children and without    She was open to continuing with EMDR and gave verbal permission to do so.  We secured a space, went over self stabilization skills, created a container which she called \"[place]\" and a peaceful inner place which she calls \"home\".  This was tolerated well.    Assisted patient in processing above session content; acknowledged and normalized patient’s thoughts, feelings, and concerns. Rationalized patient thought process regarding spousal relationship. Discussed triggers associated with patient's anxiety and depression. Also discussed coping skills for patient to implement such as 7-11 breathing, acupressure, eye roll with and without breathing.    Allowed patient to freely discuss issues without interruption or judgment. Provided safe, confidential environment to facilitate the development of positive therapeutic relationship and encourage open, honest communication. Assisted patient in identifying risk factors which would indicate the need for higher level of care including thoughts to harm self or others and/or self-harming behavior and encouraged patient to contact this office, call 911, or present to the nearest emergency room should any of these events occur. Discussed crisis intervention services and means to " access. Patient adamantly and convincingly denies current suicidal or homicidal ideation or perceptual disturbance.    Assessment   Patient appears to maintain relative stability as compared to their baseline. However, patient continues to struggle with depression and anxiety and PTSD which continues to cause impairment in important areas of functioning. A result, they can be reasonably expected to continue to benefit from treatment and would likely be at increased risk for decompensation otherwise.    Mental Status Exam:   Hygiene:   good  Cooperation:  Cooperative  Eye Contact:  Good  Psychomotor Behavior:  Appropriate  Affect:  Appropriate  Mood: anxious  Speech:  Normal  Thought Process:  Goal directed and Linear  Thought Content:  Normal  Suicidal:  None  Homicidal:  None  Hallucinations:  None  Delusion:  None  Memory:  Intact  Orientation:  Person, Place, Time and Situation  Reliability:  good  Insight:  Good  Judgement:  Good  Impulse Control:  Good  Physical/Medical Issues:  See diagnosis list     PHQ-Score Total:  PHQ-9 Total Score: PHQ-9 Depression Screening  Little interest or pleasure in doing things? 1-->several days   Feeling down, depressed, or hopeless? 2-->more than half the days   Trouble falling or staying asleep, or sleeping too much? 1-->several days   Feeling tired or having little energy? 1-->several days   Poor appetite or overeating? 0-->not at all   Feeling bad about yourself - or that you are a failure or have let yourself or your family down? 1-->several days   Trouble concentrating on things, such as reading the newspaper or watching television? 2-->more than half the days   Moving or speaking so slowly that other people could have noticed? Or the opposite - being so fidgety or restless that you have been moving around a lot more than usual? 0-->not at all   Thoughts that you would be better off dead, or of hurting yourself in some way? 0-->not at all   PHQ-9 Total Score 8   If you  checked off any problems, how difficult have these problems made it for you to do your work, take care of things at home, or get along with other people?        JIMENA-7 Total Score:   Over the last two weeks, how often have you been bothered by the following problems?  Feeling nervous, anxious or on edge: More than half the days  Not being able to stop or control worrying: Several days  Worrying too much about different things: Several days  Trouble Relaxing: Several days  Being so restless that it is hard to sit still: Several days  Becoming easily annoyed or irritable: Several days  Feeling afraid as if something awful might happen: More than half the days  JIMENA 7 Total Score: 9     Patient's Support Network Includes:  Best friend Irma, daughters,     Functional Status: Moderate impairment     Progress toward goal: Not at goal    Prognosis: Good with Ongoing Treatment          Plan     Resources: Patient was provided with the following community resources: None at this time    Patient will continue in individual outpatient therapy with focus on improved functioning and coping skills, maintaining stability, and avoiding decompensation and the need for higher level of care.    Patient will adhere to any medication regimens as prescribed and report any side effects. Patient will contact this office, call 911 or present to the nearest emergency room should suicidal or homicidal ideations occur. Provide cognitive behavioral therapy and solution focused therapy to improve functioning, maintain stability and avoid decompensation and the need for higher level of care.     Return in about 4 weeks, or earlier if symptoms worsen or fail to improve.           VISIT DIAGNOSIS:     ICD-10-CM ICD-9-CM   1. Generalized anxiety disorder  F41.1 300.02   2. Major depressive disorder with single episode, in full remission ([facility])  F32.5 296.26            This document has been electronically signed by AFTAB Suh, VALEW    March 8, 2023 17:24 EST      Part of this note may be an electronic transcription/translation of spoken language to printed text using the Dragon Dictation System.

## 2023-03-13 DIAGNOSIS — F41.1 GENERALIZED ANXIETY DISORDER: ICD-10-CM

## 2023-03-13 DIAGNOSIS — F41.0 SEVERE ANXIETY WITH PANIC: Chronic | ICD-10-CM

## 2023-03-13 NOTE — TELEPHONE ENCOUNTER
Rx Refill Note  Requested Prescriptions     Pending Prescriptions Disp Refills   • clonazePAM (KlonoPIN) 0.5 MG tablet 90 tablet 2     Sig: Take 1 tablet by mouth 3 (Three) Times a Day As Needed for Anxiety.        Next office visit with prescribing clinician:  We canceled and not set another    Telemedicine with Tammi Fraire APRN (02/13/2023)    SCANNED - LABS (11/10/2022) [facility]    SCANNED - LABS (01/07/2023) Dr Saleh    Inspect fill 2/14/23    Jeimy Mazariegos  03/13/23, 14:50 EDT

## 2023-03-16 ENCOUNTER — CLINICAL SUPPORT (OUTPATIENT)
Dept: PSYCHIATRY | Facility: CLINIC | Age: 43
End: 2023-03-16
Payer: COMMERCIAL

## 2023-03-16 DIAGNOSIS — F51.05 INSOMNIA DUE TO OTHER MENTAL DISORDER: ICD-10-CM

## 2023-03-16 DIAGNOSIS — F32.5 MAJOR DEPRESSIVE DISORDER WITH SINGLE EPISODE, IN FULL REMISSION: ICD-10-CM

## 2023-03-16 DIAGNOSIS — F41.0 SEVERE ANXIETY WITH PANIC: Primary | ICD-10-CM

## 2023-03-16 DIAGNOSIS — F99 INSOMNIA DUE TO OTHER MENTAL DISORDER: ICD-10-CM

## 2023-03-16 RX ORDER — CLONAZEPAM 0.5 MG/1
0.5 TABLET ORAL 3 TIMES DAILY PRN
Qty: 90 TABLET | Refills: 0 | Status: SHIPPED | OUTPATIENT
Start: 2023-03-16

## 2023-03-28 ENCOUNTER — OFFICE VISIT (OUTPATIENT)
Dept: PSYCHIATRY | Facility: CLINIC | Age: 43
End: 2023-03-28
Payer: COMMERCIAL

## 2023-03-28 DIAGNOSIS — F32.5 MAJOR DEPRESSIVE DISORDER WITH SINGLE EPISODE, IN FULL REMISSION: Chronic | ICD-10-CM

## 2023-03-28 DIAGNOSIS — F41.1 GENERALIZED ANXIETY DISORDER: Primary | ICD-10-CM

## 2023-03-28 PROCEDURE — 90837 PSYTX W PT 60 MINUTES: CPT | Performed by: SOCIAL WORKER
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
## 2023-03-28 NOTE — PROGRESS NOTES
Date: March 28, 2023  Time In:1112  Time Out: 1210      PROGRESS NOTE  Data:  Ally Condon is a 42 y.o. female who presents today for individual therapy session at [facility] with AFTAB Suh, ERIK.     Patient Chief Complaint: Follow-up for depression and anxiety    Clinical Maneuvering/Intervention: Ally is pleasant alert and oriented to person place and time.  She states that she is having a tough week as 1 daughter is flying by herself and has a difficult time flying.  Her other 2 children have events going on she wants to support both but is not able to be at both events at the same time.  She spoke about various family dynamics that are troublesome to her.  She did not want to continue with EMDR today but will consider that for next time.    Completed FMLA paperwork as requested by patient; paperwork given to  staff to fax to patient employer.     Assisted patient in processing above session content; acknowledged and normalized patient’s thoughts, feelings, and concerns. Rationalized patient thought process regarding family dynamics. Discussed triggers associated with patient's depression and anxiety. Also discussed coping skills for patient to implement such as 7-11 breathing and guided imagery.    Allowed patient to freely discuss issues without interruption or judgment. Provided safe, confidential environment to facilitate the development of positive therapeutic relationship and encourage open, honest communication. Assisted patient in identifying risk factors which would indicate the need for higher level of care including thoughts to harm self or others and/or self-harming behavior and encouraged patient to contact this office, call 911, or present to the nearest emergency room should any of these events occur. Discussed crisis intervention services and means to access. Patient adamantly and convincingly denies current suicidal or homicidal ideation or perceptual  disturbance.    Assessment   Patient appears to maintain relative stability as compared to their baseline. However, patient continues to struggle with depression and anxiety which continues to cause impairment in important areas of functioning. A result, they can be reasonably expected to continue to benefit from treatment and would likely be at increased risk for decompensation otherwise.    Mental Status Exam:   Hygiene:   good  Cooperation:  Cooperative  Eye Contact:  Good  Psychomotor Behavior:  Appropriate  Affect:  Appropriate  Mood: depressed and anxious  Speech:  Normal  Thought Process:  Goal directed and Linear  Thought Content:  Normal  Suicidal:  None  Homicidal:  None  Hallucinations:  None  Delusion:  None  Memory:  Intact  Orientation:  Person, Place, Time and Situation  Reliability:  good  Insight:  Good  Judgement:  Good  Impulse Control:  Good  Physical/Medical Issues:  See diagnosis list     PHQ-Score Total:  PHQ-9 Total Score: PHQ-9 Depression Screening  Little interest or pleasure in doing things? 2-->more than half the days   Feeling down, depressed, or hopeless? 2-->more than half the days   Trouble falling or staying asleep, or sleeping too much? 1-->several days   Feeling tired or having little energy? 0-->not at all   Poor appetite or overeating? 2-->more than half the days   Feeling bad about yourself - or that you are a failure or have let yourself or your family down? 1-->several days   Trouble concentrating on things, such as reading the newspaper or watching television? 1-->several days   Moving or speaking so slowly that other people could have noticed? Or the opposite - being so fidgety or restless that you have been moving around a lot more than usual? 0-->not at all   Thoughts that you would be better off dead, or of hurting yourself in some way?     PHQ-9 Total Score 9   If you checked off any problems, how difficult have these problems made it for you to do your work, take care  of things at home, or get along with other people?        JIMENA-7 Total Score:   Over the last two weeks, how often have you been bothered by the following problems?  Feeling nervous, anxious or on edge: More than half the days  Not being able to stop or control worrying: More than half the days  Worrying too much about different things: More than half the days  Trouble Relaxing: More than half the days  Being so restless that it is hard to sit still: More than half the days  Becoming easily annoyed or irritable: More than half the days  Feeling afraid as if something awful might happen: More than half the days  JIMENA 7 Total Score: 14     Patient's Support Network Includes:  Her best friend Irma and her daughters.    Functional Status: Moderate impairment     Progress toward goal: Not at goal    Prognosis: Good with Ongoing Treatment          Plan     Resources: Patient was provided with the following community resources: None at this time    Patient will continue in individual outpatient therapy with focus on improved functioning and coping skills, maintaining stability, and avoiding decompensation and the need for higher level of care.    Patient will adhere to any medication regimens as prescribed and report any side effects. Patient will contact this office, call 911 or present to the nearest emergency room should suicidal or homicidal ideations occur. Provide cognitive behavioral therapy and solution focused therapy to improve functioning, maintain stability and avoid decompensation and the need for higher level of care.     Return in about 4 weeks, or earlier if symptoms worsen or fail to improve.           VISIT DIAGNOSIS:     ICD-10-CM ICD-9-CM   1. Generalized anxiety disorder  F41.1 300.02   2. Major depressive disorder with single episode, in full remission ([facility])  F32.5 296.26            This document has been electronically signed by AFTAB Suh, ERIK   March 28, 2023 16:32 EDT      Part of this  note may be an electronic transcription/translation of spoken language to printed text using the Dragon Dictation System.
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
## 2023-04-11 ENCOUNTER — OFFICE VISIT (OUTPATIENT)
Dept: PSYCHIATRY | Facility: CLINIC | Age: 43
End: 2023-04-11
Payer: COMMERCIAL

## 2023-04-11 DIAGNOSIS — F41.1 GENERALIZED ANXIETY DISORDER: Primary | ICD-10-CM

## 2023-04-11 DIAGNOSIS — F32.5 MAJOR DEPRESSIVE DISORDER WITH SINGLE EPISODE, IN FULL REMISSION: Chronic | ICD-10-CM

## 2023-04-11 PROCEDURE — 90837 PSYTX W PT 60 MINUTES: CPT | Performed by: SOCIAL WORKER

## 2023-04-22 RX ORDER — ALBUTEROL SULFATE 90 UG/1
AEROSOL, METERED RESPIRATORY (INHALATION)
Qty: 8.5 G | Refills: 0 | Status: SHIPPED | OUTPATIENT
Start: 2023-04-22

## 2023-04-25 ENCOUNTER — OFFICE VISIT (OUTPATIENT)
Dept: PSYCHIATRY | Facility: CLINIC | Age: 43
End: 2023-04-25
Payer: COMMERCIAL

## 2023-04-25 DIAGNOSIS — F41.1 GENERALIZED ANXIETY DISORDER: Primary | ICD-10-CM

## 2023-04-25 DIAGNOSIS — F32.5 MAJOR DEPRESSIVE DISORDER WITH SINGLE EPISODE, IN FULL REMISSION: Chronic | ICD-10-CM

## 2023-04-25 NOTE — PROGRESS NOTES
"Date: April 25, 2023  Time In: 1519  Time Out:1619      PROGRESS NOTE  Data:  Ally Condon is a 42 y.o. female who presents today for individual therapy session at [facility] with AFTAB Suh, ERIK.     Patient Chief Complaint: Follow-up for depression and anxiety    Clinical Maneuvering/Intervention: Ally is pleasant, alert and oriented to person place and time.  She talked at length about an incident that she has with her daughter Sakina regarding her schoolwork and related dishonesty and getting that schoolwork done.  She asked questions about parenting, discipline and we discussed what she wanted ultimately for Juanita's life.  She also talked about her relationship with her  and the struggles that they are having.  She was referred to [facility]ales marriage and family therapist.  We did continue to work on the EMDR targeted sequence planning which she gave verbal permission to do.  Her negative core belief is \"I am not enough\" with a more adaptive belief \"I am enough, worthy and confident, regardless.\"  She is agreeable to continuing with EMDR next session    Assisted patient in processing above session content; acknowledged and normalized patient’s thoughts, feelings, and concerns. Rationalized patient thought process regarding parenting and consequences. Discussed triggers associated with patient's depression and anxiety. Also discussed coping skills for patient to implement such as continuing skills already built.    Allowed patient to freely discuss issues without interruption or judgment. Provided safe, confidential environment to facilitate the development of positive therapeutic relationship and encourage open, honest communication. Assisted patient in identifying risk factors which would indicate the need for higher level of care including thoughts to harm self or others and/or self-harming behavior and encouraged patient to contact this office, call 911, or present to " the nearest emergency room should any of these events occur. Discussed crisis intervention services and means to access. Patient adamantly and convincingly denies current suicidal or homicidal ideation or perceptual disturbance.    Assessment   Patient appears to maintain relative stability as compared to their baseline. However, patient continues to struggle with depression and anxiety which continues to cause impairment in important areas of functioning. A result, they can be reasonably expected to continue to benefit from treatment and would likely be at increased risk for decompensation otherwise.    Mental Status Exam:   Hygiene:   good  Cooperation:  Cooperative  Eye Contact:  Good  Psychomotor Behavior:  Appropriate  Affect:  Appropriate  Mood: anxious  Speech:  Normal  Thought Process:  Goal directed and Linear  Thought Content:  Normal  Suicidal:  None  Homicidal:  None  Hallucinations:  None  Delusion:  None  Memory:  Intact  Orientation:  Person, Place, Time and Situation  Reliability:  good  Insight:  Good  Judgement:  Good  Impulse Control:  Good  Physical/Medical Issues:  See diagnosis list     PHQ-Score Total:  PHQ-9 Total Score: PHQ-9 Depression Screening  Little interest or pleasure in doing things? 1-->several days   Feeling down, depressed, or hopeless? 2-->more than half the days   Trouble falling or staying asleep, or sleeping too much? 1-->several days   Feeling tired or having little energy? 1-->several days   Poor appetite or overeating? 0-->not at all   Feeling bad about yourself - or that you are a failure or have let yourself or your family down? 1-->several days   Trouble concentrating on things, such as reading the newspaper or watching television? 1-->several days   Moving or speaking so slowly that other people could have noticed? Or the opposite - being so fidgety or restless that you have been moving around a lot more than usual? 1-->several days   Thoughts that you would be better  off dead, or of hurting yourself in some way? 0-->not at all   PHQ-9 Total Score 8   If you checked off any problems, how difficult have these problems made it for you to do your work, take care of things at home, or get along with other people?        JIMENA-7 Total Score:   Over the last two weeks, how often have you been bothered by the following problems?  Feeling nervous, anxious or on edge: More than half the days  Not being able to stop or control worrying: Several days  Worrying too much about different things: More than half the days  Trouble Relaxing: More than half the days  Being so restless that it is hard to sit still: More than half the days  Becoming easily annoyed or irritable: More than half the days  Feeling afraid as if something awful might happen: Several days  JIMENA 7 Total Score: 12     Patient's Support Network Includes:  children     Functional Status: Moderate impairment      Progress toward goal: Not at goal     Prognosis: Good with Ongoing Treatment            Plan     Resources: Patient was provided with the following community resources: Jadiel Rosales, marriage and family therapy    Patient will continue in individual outpatient therapy with focus on improved functioning and coping skills, maintaining stability, and avoiding decompensation and the need for higher level of care.    Patient will adhere to any medication regimens as prescribed and report any side effects. Patient will contact this office, call 911 or present to the nearest emergency room should suicidal or homicidal ideations occur. Provide cognitive behavioral therapy and solution focused therapy to improve functioning, maintain stability and avoid decompensation and the need for higher level of care.     Return in about 4 weeks, or earlier if symptoms worsen or fail to improve.           VISIT DIAGNOSIS: No diagnosis found.         This document has been electronically signed by AFTAB Suh, ERIK   April 25, 2023 16:24  EDT      Part of this note may be an electronic transcription/translation of spoken language to printed text using the Dragon Dictation System.

## 2023-05-10 NOTE — PROGRESS NOTES
Subjective   Ally Condon is a 42 y.o. female who presents today for follow-up for psychiatric medication management.      Chief Complaint:  Follow up for depression, anxiety, and insomnia.      History of Present Illness:     Medication adjustments last visit:   Continue Vilazodone 20mg daily.   Continue clonazepam 0.5mg TID PRN for anxiety.      At today's visit, patient states she has a lot of circumstantial things going on right now which are making her depression and anxiety worse.   Her 15 yr old daughter got sick in [place]. She is having GI problems.They are going to do testing to rule out colon cancer. She has lost a lot of weight, etc. They are waiting for referral appointments to specialists. This has the patient's anxiety really high.   No suicidal thoughts. No HI/AVH.   She is in counseling with Seema licona and she states that is going well.   We discussed leaving medications the same for now.     Ability and capacity to respond to treatment: good  Functional status: good  Prognosis: good  Long term goals: improve depression and overall quality of life.   Short term goals:improve sleep.   Strengths: Patient is compliant with appointments and medications.    Weaknesses: Patient can be self-critical at times.     Patient presents with symptoms and behaviors that are consistent with the following DSM-5 diagnoses:  1. Major depressive disorder  2. Generalized anxiety disorder  3. Panic disorder  4. Insomnia    The following portions of the patient's history were reviewed and updated as appropriate: allergies, current medications, past family history, past medical history, past social history, past surgical history and problem list.    PAST OUTPATIENT TREATMENT  Diagnosis treated:  Affective Disorder, Anxiety/Panic Disorder  Treatment Type:  Medication Management  Prior Psychiatric Medications:  Xanax - effective  Klonopin - effective in the past  Buspirone - ineffective after 2-3 months at uncertain  "dose.  Hydroxyzine - ineffective at 10mg  Amitriptyline  Lexapro - ineffective  Wellbutrin - ineffective  Pristiq - made her \"too happy,\" didn't like how it made her feel hot like BP increased.  Support Groups:  None  Sequelae Of Mental Disorder:  emotional distress    Interval History  Improved    Side Effects  None      Past Medical History:  Past Medical History:   Diagnosis Date   • Abnormal ECG 2021   • Aneurysm of left internal carotid artery 2012   • Anxiety    • Depression    • Generalized anxiety disorder 2023   • Heart murmur    • Hypertension    • Other hyperlipidemia    • Right internal carotid artery aneurysm 2014   • Unilateral emphysema        Social History:  Social History     Socioeconomic History   • Marital status:    Tobacco Use   • Smoking status: Every Day     Packs/day: 0.50     Years: 20.00     Pack years: 10.00     Types: Cigarettes   • Smokeless tobacco: Never   Vaping Use   • Vaping Use: Never used   Substance and Sexual Activity   • Alcohol use: Yes     Comment: occassionally   • Drug use: No   • Sexual activity: Yes     Partners: Male     Birth control/protection: None, Tubal ligation       Family History:  Family History   Problem Relation Age of Onset   • Anxiety disorder Maternal Grandmother        Past Surgical History:  Past Surgical History:   Procedure Laterality Date   • APPENDECTOMY     • ARTERIAL ANEURYSM REPAIR      Brain   • BRAIN SURGERY     • CEREBRAL ANGIOGRAM Right 2022    Sergio Casper   •  SECTION      x2   • CHOLECYSTECTOMY     • COLON SURGERY     • COLONOSCOPY     • SMALL INTESTINE SURGERY     • TONSILLECTOMY         Problem List:  Patient Active Problem List   Diagnosis   • Aneurysm of left internal carotid artery   • Right internal carotid artery aneurysm   • Body mass index (BMI) of 24.0-24.9 in adult   • Contact allergic reaction   • Headache   • Hidradenitis suppurativa   • Hx of pancreatitis   • Migraine   • " Tobacco abuse   • Urticaria   • Severe anxiety with panic   • Unilateral emphysema   • Right groin pain   • Obesity   • Class 1 obesity due to excess calories with serious comorbidity and body mass index (BMI) of 33.0 to 33.9 in adult   • Hypertension   • Other hyperlipidemia   • Right elbow pain   • Major depressive disorder with single episode, in full remission   • Intractable right heel pain   • Insomnia due to other mental disorder   • Generalized anxiety disorder       Allergy:   Allergies   Allergen Reactions   • Penicillins Other (See Comments)     Childhood reaction: reaction unknown, but patient was hospitalized as a result.     • Iodinated Contrast Media Hives     Per states she is not allergic to iodinated diagnostic agents.         Discontinued Medications:  Medications Discontinued During This Encounter   Medication Reason   • clopidogrel (PLAVIX) 75 MG tablet    • vilazodone (VIIBRYD) 20 MG tablet tablet Reorder   • clonazePAM (KlonoPIN) 0.5 MG tablet Reorder       Current Medications:   Current Outpatient Medications   Medication Sig Dispense Refill   • Acetaminophen (Tylenol) 325 MG capsule Take  by mouth.     • adapalene (DIFFERIN) 0.3 % gel APPLY TOPICALLY TO THE AFFECTED AREA EVERY MORNING     • albuterol sulfate  (90 Base) MCG/ACT inhaler INHALE 2 PUFFS BY MOUTH EVERY 4 HOURS AS NEEDED FOR WHEEZING 8.5 g 0   • amitriptyline (ELAVIL) 10 MG tablet TAKE 1 TABLET BY MOUTH EVERY NIGHT 30 tablet 2   • aspirin 81 MG chewable tablet Chew 1 tablet Daily.     • atorvastatin (LIPITOR) 20 MG tablet TAKE 1 TABLET BY MOUTH DAILY 90 tablet 3   • cetirizine (zyrTEC) 10 MG tablet TAKE 1 TABLET BY MOUTH EVERY DAY 90 tablet 3   • clonazePAM (KlonoPIN) 0.5 MG tablet Take 1 tablet by mouth 3 (Three) Times a Day As Needed for Anxiety. 90 tablet 3   • famotidine (PEPCID) 20 MG tablet TAKE 1 TABLET BY MOUTH DAILY 30 tablet 6   • hydrOXYzine (ATARAX) 25 MG tablet TAKE 1 TABLET BY MOUTH THREE TIMES DAILY AS NEEDED  FOR ANXIETY 90 tablet 1   • Melatonin 10 MG tablet Take 1 tablet by mouth Every Night.     • metoprolol tartrate (LOPRESSOR) 25 MG tablet TAKE 1 TABLET BY MOUTH TWICE DAILY 180 tablet 3   • ondansetron (Zofran) 4 MG tablet Take 1 tablet by mouth Every 8 (Eight) Hours As Needed for Nausea or Vomiting. 30 tablet 1   • prochlorperazine (COMPAZINE) 10 MG tablet TAKE 1 TABLET BY MOUTH TWICE DAILY AS NEEDED FOR HEADACHE     • QUEtiapine (SEROquel) 50 MG tablet Take 1 tablet by mouth Every Night. 30 tablet 2   • tolterodine LA (DETROL LA) 2 MG 24 hr capsule Take 1 capsule by mouth 2 (Two) Times a Day.     • tretinoin (RETIN-A) 0.1 % cream APPLY PEA SIZED AMOUNT TOPICALLY TO THE AFFECTED AREA AT BEDTIME     • vilazodone (VIIBRYD) 20 MG tablet tablet Take 1 tablet by mouth Daily. 90 tablet 3     No current facility-administered medications for this visit.         Psychological ROS: positive for - anxiety, depression and sleep disturbances  negative for - behavioral disorder, concentration difficulties, decreased libido, disorientation, hallucinations, hostility, irritability, memory difficulties, mood swings, obsessive thoughts, physical abuse, sexual abuse or suicidal ideation      Physical Exam:   not currently breastfeeding.    Mental Status Exam:   Hygiene:   good  Cooperation:  Cooperative  Eye Contact:  Good  Psychomotor Behavior:  Appropriate  Affect:  Appropriate  Mood: euthymic  Hopelessness: Denies  Speech:  Normal  Thought Process:  Goal directed and Linear  Thought Content:  Normal  Suicidal:  None  Homicidal:  None  Hallucinations:  None  Delusion:  None  Memory:  Intact  Orientation:  Person, Place, Time and Situation  Reliability:  good  Insight:  Good  Judgement:  Good  Impulse Control:  Good  Physical/Medical Issues:  No      Mental status exam was reviewed and compared to visit on 2/13/23 and appropriate updates were made.     PHQ-9 Depression Screening    Little interest or pleasure in doing things?  1-->several days   Feeling down, depressed, or hopeless? 2-->more than half the days   Trouble falling or staying asleep, or sleeping too much? 2-->more than half the days   Feeling tired or having little energy? 2-->more than half the days   Poor appetite or overeating? 1-->several days   Feeling bad about yourself - or that you are a failure or have let yourself or your family down? 2-->more than half the days   Trouble concentrating on things, such as reading the newspaper or watching television? 2-->more than half the days   Moving or speaking so slowly that other people could have noticed? Or the opposite - being so fidgety or restless that you have been moving around a lot more than usual? 0-->not at all   Thoughts that you would be better off dead, or of hurting yourself in some way? 0-->not at all   PHQ-9 Total Score 12   If you checked off any problems, how difficult have these problems made it for you to do your work, take care of things at home, or get along with other people? somewhat difficult        Current every day smoker less than 3 minutes spent counseling Not agreeable to stopping    I advised Ally of the risks of tobacco use.     Result Review:    Labs:  No visits with results within 3 Month(s) from this visit.   Latest known visit with results is:   Office Visit on 08/05/2022   Component Date Value Ref Range Status   • Color 08/05/2022 Yellow  Yellow, Straw, Dark Yellow, Prisca Final   • Clarity, UA 08/05/2022 Clear  Clear Final   • Specific Gravity  08/05/2022 1.015  1.005 - 1.030 Final   • pH, Urine 08/05/2022 6.5  5.0 - 8.0 Final   • Leukocytes 08/05/2022 Negative  Negative Final   • Nitrite, UA 08/05/2022 Negative  Negative Final   • Protein, POC 08/05/2022 Negative  Negative mg/dL Final   • Glucose, UA 08/05/2022 Negative  Negative mg/dL Final   • Ketones, UA 08/05/2022 Negative  Negative Final   • Urobilinogen, UA 08/05/2022 Normal  Normal Final   • Bilirubin 08/05/2022 Negative  Negative  Final   • Blood, UA 08/05/2022 Negative  Negative Final   • Lot Number 08/05/2022 [id number]   Final   • Expiration Date 08/05/2022 02/28/2023   Final   • COVID19 08/05/2022 Not Detected  Not Detected - Ref. Range Final       Assessment & Plan   Diagnoses and all orders for this visit:    1. Major depressive disorder, recurrent episode, moderate (Primary)  -     vilazodone (VIIBRYD) 20 MG tablet tablet; Take 1 tablet by mouth Daily.  Dispense: 90 tablet; Refill: 3    2. Generalized anxiety disorder  -     clonazePAM (KlonoPIN) 0.5 MG tablet; Take 1 tablet by mouth 3 (Three) Times a Day As Needed for Anxiety.  Dispense: 90 tablet; Refill: 3    3. Panic disorder    4. Insomnia due to other mental disorder         Continue Vilazodone 20mg daily.   Continue clonazepam 0.5mg TID PRN for anxiety.      Visit Diagnoses:    ICD-10-CM ICD-9-CM   1. Major depressive disorder, recurrent episode, moderate  F33.1 296.32   2. Generalized anxiety disorder  F41.1 300.02   3. Panic disorder  F41.0 300.01   4. Insomnia due to other mental disorder  F51.05 300.9    F99 327.02       TREATMENT PLAN/GOALS: Continue supportive psychotherapy efforts and medications as indicated. Treatment and medication options discussed during today's visit. Patient ackowledged and verbally consented to continue with current treatment plan and was educated on the importance of compliance with treatment and follow-up appointments.    MEDICATION ISSUES:  INSPECT reviewed as expected.    UDS on 11/10/22 consistent with prescribed medications.     Discussed medication options and treatment plan of prescribed medication as well as the risks, benefits, and side effects including potential falls, possible impaired driving and metabolic adversities among others. Patient is agreeable to call the office with any worsening of symptoms or onset of side effects. Patient is agreeable to call 911 or go to the nearest ER should he/she begin having SI/HI. No medication side  effects or related complaints today.     MEDS ORDERED DURING VISIT:  New Medications Ordered This Visit   Medications   • clonazePAM (KlonoPIN) 0.5 MG tablet     Sig: Take 1 tablet by mouth 3 (Three) Times a Day As Needed for Anxiety.     Dispense:  90 tablet     Refill:  3   • vilazodone (VIIBRYD) 20 MG tablet tablet     Sig: Take 1 tablet by mouth Daily.     Dispense:  90 tablet     Refill:  3       Return in about 3 months (around 8/11/2023).         This document has been electronically signed by JOHANNY Otoole  May 11, 2023 12:42 EDT    Part of this note may be an electronic transcription/translation of spoken language to printed text using the Dragon Dictation System.

## 2023-05-11 ENCOUNTER — OFFICE VISIT (OUTPATIENT)
Dept: PSYCHIATRY | Facility: CLINIC | Age: 43
End: 2023-05-11
Payer: COMMERCIAL

## 2023-05-11 DIAGNOSIS — F99 INSOMNIA DUE TO OTHER MENTAL DISORDER: Chronic | ICD-10-CM

## 2023-05-11 DIAGNOSIS — F51.05 INSOMNIA DUE TO OTHER MENTAL DISORDER: Chronic | ICD-10-CM

## 2023-05-11 DIAGNOSIS — F33.1 MAJOR DEPRESSIVE DISORDER, RECURRENT EPISODE, MODERATE: Primary | Chronic | ICD-10-CM

## 2023-05-11 DIAGNOSIS — F41.0 PANIC DISORDER: Chronic | ICD-10-CM

## 2023-05-11 DIAGNOSIS — F41.1 GENERALIZED ANXIETY DISORDER: Chronic | ICD-10-CM

## 2023-05-11 RX ORDER — VILAZODONE HYDROCHLORIDE 20 MG/1
20 TABLET ORAL DAILY
Qty: 90 TABLET | Refills: 3 | Status: SHIPPED | OUTPATIENT
Start: 2023-05-11

## 2023-05-11 RX ORDER — CLONAZEPAM 0.5 MG/1
0.5 TABLET ORAL 3 TIMES DAILY PRN
Qty: 90 TABLET | Refills: 3 | Status: SHIPPED | OUTPATIENT
Start: 2023-05-11

## 2023-05-18 ENCOUNTER — OFFICE VISIT (OUTPATIENT)
Dept: FAMILY MEDICINE CLINIC | Facility: CLINIC | Age: 43
End: 2023-05-18
Payer: COMMERCIAL

## 2023-05-18 VITALS
BODY MASS INDEX: 34.51 KG/M2 | DIASTOLIC BLOOD PRESSURE: 79 MMHG | OXYGEN SATURATION: 95 % | HEIGHT: 60 IN | HEART RATE: 68 BPM | WEIGHT: 175.8 LBS | SYSTOLIC BLOOD PRESSURE: 122 MMHG | TEMPERATURE: 98.6 F

## 2023-05-18 DIAGNOSIS — Z00.00 ANNUAL PHYSICAL EXAM: Primary | ICD-10-CM

## 2023-05-18 DIAGNOSIS — F41.0 SEVERE ANXIETY WITH PANIC: Chronic | ICD-10-CM

## 2023-05-18 DIAGNOSIS — E66.09 CLASS 1 OBESITY DUE TO EXCESS CALORIES WITH SERIOUS COMORBIDITY AND BODY MASS INDEX (BMI) OF 34.0 TO 34.9 IN ADULT: ICD-10-CM

## 2023-05-18 DIAGNOSIS — Z12.31 ENCOUNTER FOR SCREENING MAMMOGRAM FOR MALIGNANT NEOPLASM OF BREAST: ICD-10-CM

## 2023-05-18 DIAGNOSIS — E55.9 VITAMIN D DEFICIENCY: ICD-10-CM

## 2023-05-18 DIAGNOSIS — E53.8 B12 DEFICIENCY: ICD-10-CM

## 2023-05-18 DIAGNOSIS — Z23 NEED FOR TDAP VACCINATION: ICD-10-CM

## 2023-05-18 DIAGNOSIS — Z72.0 TOBACCO ABUSE: ICD-10-CM

## 2023-05-18 DIAGNOSIS — G43.109 MIGRAINE WITH AURA AND WITHOUT STATUS MIGRAINOSUS, NOT INTRACTABLE: ICD-10-CM

## 2023-05-18 DIAGNOSIS — J43.0 UNILATERAL EMPHYSEMA: ICD-10-CM

## 2023-05-18 DIAGNOSIS — R05.3 CHRONIC COUGH: ICD-10-CM

## 2023-05-18 DIAGNOSIS — E78.49 OTHER HYPERLIPIDEMIA: ICD-10-CM

## 2023-05-18 DIAGNOSIS — F32.1 CURRENT MODERATE EPISODE OF MAJOR DEPRESSIVE DISORDER WITHOUT PRIOR EPISODE: ICD-10-CM

## 2023-05-18 PROCEDURE — 90471 IMMUNIZATION ADMIN: CPT | Performed by: FAMILY MEDICINE

## 2023-05-18 PROCEDURE — 90715 TDAP VACCINE 7 YRS/> IM: CPT | Performed by: FAMILY MEDICINE

## 2023-05-18 PROCEDURE — 99396 PREV VISIT EST AGE 40-64: CPT | Performed by: FAMILY MEDICINE

## 2023-05-18 RX ORDER — AMITRIPTYLINE HYDROCHLORIDE 10 MG/1
10 TABLET, FILM COATED ORAL NIGHTLY
Qty: 30 TABLET | Refills: 0 | Status: SHIPPED | OUTPATIENT
Start: 2023-05-18

## 2023-05-18 RX ORDER — FLUTICASONE FUROATE AND VILANTEROL 100; 25 UG/1; UG/1
1 POWDER RESPIRATORY (INHALATION)
Qty: 1 EACH | Refills: 5 | Status: SHIPPED | OUTPATIENT
Start: 2023-05-18

## 2023-05-18 NOTE — PROGRESS NOTES
Chief Complaint   Patient presents with    Annual Exam     Annual for work with blood work  Not fasting     Subjective   Ally Condon is a 42 y.o. female here for her annual physical with me. Ally is here for coordination of medical care, to discuss health maintenance, disease prevention as well as to followup on medical problems. Patient's last PE was 4/2019. Activity level is  adequate/stable . Appetite is good. Feels well with few complaints. Energy level is good. Sleeps well.     Patient's has not yet had a baseline mammogram.  Patient's last pap smear was 4/2021 Negative, HPV negative - advised repeat in 5 years.  She has no pelvic or menstrual complaints today.    Patient is doing routine self skin exam occasionally. Patient is doing routine self-breast exams occasionally.    The following portions of the patient's history were reviewed and updated as appropriate: allergies, current medications, past family history, past medical history, past social history, past surgical history and problem list.      Past Medical History :  Active Ambulatory Problems     Diagnosis Date Noted    Aneurysm of left internal carotid artery 05/13/2012    Right internal carotid artery aneurysm 12/11/2014    Body mass index (BMI) of 24.0-24.9 in adult 08/21/2018    Contact allergic reaction 09/26/2018    Headache 08/21/2018    Hidradenitis suppurativa 08/31/2018    Hx of pancreatitis 08/21/2018    Migraine 12/29/2014    Tobacco abuse 07/08/2015    Urticaria 02/28/2019    Severe anxiety with panic 09/17/2019    Unilateral emphysema 02/07/2020    Right groin pain 12/17/2014    Obesity 04/23/2021    Class 1 obesity due to excess calories with serious comorbidity and body mass index (BMI) of 33.0 to 33.9 in adult 04/23/2021    Hypertension 2011    Other hyperlipidemia 2011    Right elbow pain 03/21/2022    Major depressive disorder with single episode, in full remission 04/12/2022    Intractable right heel pain 06/07/2022    Insomnia  due to other mental disorder 09/27/2022    Generalized anxiety disorder 02/02/2023     Resolved Ambulatory Problems     Diagnosis Date Noted    Mixed anxiety depressive disorder 08/21/2018    Heart murmur 08/03/2021     Past Medical History:   Diagnosis Date    Abnormal ECG 08/2021    Anxiety     Depression        Medication List:    Current Outpatient Medications:     Acetaminophen (Tylenol) 325 MG capsule, Take  by mouth., Disp: , Rfl:     adapalene (DIFFERIN) 0.3 % gel, APPLY TOPICALLY TO THE AFFECTED AREA EVERY MORNING, Disp: , Rfl:     albuterol sulfate  (90 Base) MCG/ACT inhaler, INHALE 2 PUFFS BY MOUTH EVERY 4 HOURS AS NEEDED FOR WHEEZING, Disp: 8.5 g, Rfl: 0    amitriptyline (ELAVIL) 10 MG tablet, TAKE 1 TABLET BY MOUTH EVERY NIGHT, Disp: 30 tablet, Rfl: 0    aspirin 81 MG chewable tablet, Chew 1 tablet Daily., Disp: , Rfl:     atorvastatin (LIPITOR) 20 MG tablet, TAKE 1 TABLET BY MOUTH DAILY, Disp: 90 tablet, Rfl: 3    cetirizine (zyrTEC) 10 MG tablet, TAKE 1 TABLET BY MOUTH EVERY DAY, Disp: 90 tablet, Rfl: 3    clonazePAM (KlonoPIN) 0.5 MG tablet, Take 1 tablet by mouth 3 (Three) Times a Day As Needed for Anxiety., Disp: 90 tablet, Rfl: 3    famotidine (PEPCID) 20 MG tablet, TAKE 1 TABLET BY MOUTH DAILY, Disp: 30 tablet, Rfl: 6    hydrOXYzine (ATARAX) 25 MG tablet, TAKE 1 TABLET BY MOUTH THREE TIMES DAILY AS NEEDED FOR ANXIETY, Disp: 90 tablet, Rfl: 1    Melatonin 10 MG tablet, Take 1 tablet by mouth Every Night., Disp: , Rfl:     metoprolol tartrate (LOPRESSOR) 25 MG tablet, TAKE 1 TABLET BY MOUTH TWICE DAILY, Disp: 180 tablet, Rfl: 3    ondansetron (Zofran) 4 MG tablet, Take 1 tablet by mouth Every 8 (Eight) Hours As Needed for Nausea or Vomiting., Disp: 30 tablet, Rfl: 1    prochlorperazine (COMPAZINE) 10 MG tablet, TAKE 1 TABLET BY MOUTH TWICE DAILY AS NEEDED FOR HEADACHE, Disp: , Rfl:     QUEtiapine (SEROquel) 50 MG tablet, Take 1 tablet by mouth Every Night., Disp: 30 tablet, Rfl: 2     tolterodine LA (DETROL LA) 2 MG 24 hr capsule, Take 1 capsule by mouth 2 (Two) Times a Day., Disp: , Rfl:     tretinoin (RETIN-A) 0.1 % cream, APPLY PEA SIZED AMOUNT TOPICALLY TO THE AFFECTED AREA AT BEDTIME, Disp: , Rfl:     vilazodone (VIIBRYD) 20 MG tablet tablet, Take 1 tablet by mouth Daily., Disp: 90 tablet, Rfl: 3    Allergies:  Allergies   Allergen Reactions    Penicillins Other (See Comments)     Childhood reaction: reaction unknown, but patient was hospitalized as a result.      Iodinated Contrast Media Hives     Per states she is not allergic to iodinated diagnostic agents.        Social History:  Social History     Socioeconomic History    Marital status:    Tobacco Use    Smoking status: Every Day     Packs/day: 0.50     Years: 20.00     Pack years: 10.00     Types: Cigarettes    Smokeless tobacco: Never   Vaping Use    Vaping Use: Never used   Substance and Sexual Activity    Alcohol use: Yes     Comment: occassionally    Drug use: No    Sexual activity: Yes     Partners: Male     Birth control/protection: None, Tubal ligation       Family History:  Family History   Problem Relation Age of Onset    Anxiety disorder Maternal Grandmother        Past Surgical History:  Past Surgical History:   Procedure Laterality Date    APPENDECTOMY      ARTERIAL ANEURYSM REPAIR      Brain    BRAIN SURGERY      CEREBRAL ANGIOGRAM Right 2022    [facility]     SECTION      x2    CHOLECYSTECTOMY      COLON SURGERY      COLONOSCOPY      SMALL INTESTINE SURGERY      TONSILLECTOMY         PHQ-2 Depression Screening  Little interest or pleasure in doing things?     Feeling down, depressed, or hopeless?     PHQ-2 Total Score       Health Maintenance   Topic Date Due    COVID-19 Vaccine (1) Never done    Pneumococcal Vaccine 0-64 (2 - PCV) 2022    LIPID PANEL  2023    INFLUENZA VACCINE  2023    PAP SMEAR  2024    ANNUAL PHYSICAL  2024    TDAP/TD VACCINES (2 - Td or  "Tdap) 05/18/2033    HEPATITIS C SCREENING  Completed       Immunization History   Administered Date(s) Administered    FluLaval/Fluzone >6mos 02/05/2021    H1N1 Inj 12/03/2009    Pneumococcal Polysaccharide (PPSV23) 02/05/2021    Tdap 05/18/2023         Review Of Systems:  Review of Systems   Constitutional:  Negative for chills and fever.   HENT:  Negative for ear pain and sore throat.    Eyes:  Negative for double vision and visual disturbance.   Respiratory:  Positive for cough. Negative for wheezing.         H/o emphysema   Cardiovascular:  Negative for chest pain, palpitations and leg swelling.   Gastrointestinal:  Negative for abdominal pain, blood in stool, constipation, diarrhea, nausea and vomiting.   Endocrine: Negative for polydipsia and polyuria.   Genitourinary:  Negative for breast discharge, breast lump, breast pain, dysuria, hematuria, pelvic pain and vaginal bleeding.   Musculoskeletal:  Negative for arthralgias and myalgias.   Skin:  Negative for rash.   Neurological:  Negative for seizures, syncope, numbness and headache.   Psychiatric/Behavioral:  Negative for behavioral problems and depressed mood.        Physical Exam:  Vital Signs:  /79 (BP Location: Left arm, Patient Position: Sitting, Cuff Size: Large Adult)   Pulse 68   Temp 98.6 °F (37 °C) (Tympanic)   Ht 152.4 cm (60\")   Wt 79.7 kg (175 lb 12.8 oz)   SpO2 95%   BMI 34.33 kg/m²     No LMP recorded. (Menstrual status: Tubal ligation).      Physical Exam  Constitutional:       General: She is not in acute distress.     Appearance: Normal appearance. She is well-developed. She is not ill-appearing.   HENT:      Head: Normocephalic and atraumatic.      Right Ear: Tympanic membrane, ear canal and external ear normal.      Left Ear: Tympanic membrane, ear canal and external ear normal.      Nose: Nose normal.      Mouth/Throat:      Mouth: Mucous membranes are moist.      Pharynx: No posterior oropharyngeal erythema.   Eyes:      " General: No scleral icterus.        Right eye: No discharge.         Left eye: No discharge.      Extraocular Movements: Extraocular movements intact.      Conjunctiva/sclera: Conjunctivae normal.      Pupils: Pupils are equal, round, and reactive to light.   Neck:      Thyroid: No thyromegaly.      Vascular: No carotid bruit or JVD.   Cardiovascular:      Rate and Rhythm: Normal rate and regular rhythm.      Pulses: Normal pulses.      Heart sounds: Normal heart sounds. No murmur heard.  Pulmonary:      Effort: Pulmonary effort is normal.      Breath sounds: Normal breath sounds. No wheezing or rales.   Abdominal:      General: There is no distension.      Palpations: Abdomen is soft.      Tenderness: There is no abdominal tenderness. There is no guarding or rebound.   Genitourinary:     Comments: Exam not performed- patient declines breast and pelvic exam today  Musculoskeletal:         General: No tenderness. Normal range of motion.      Cervical back: Normal range of motion and neck supple. No edema.   Lymphadenopathy:      Cervical: No cervical adenopathy.   Skin:     General: Skin is warm.      Capillary Refill: Capillary refill takes less than 2 seconds.      Findings: No erythema, lesion or rash.   Neurological:      General: No focal deficit present.      Mental Status: She is alert and oriented to person, place, and time. Mental status is at baseline.      Cranial Nerves: No cranial nerve deficit.      Motor: No abnormal muscle tone.      Coordination: Coordination normal.   Psychiatric:         Mood and Affect: Mood normal.         Behavior: Behavior normal.         Thought Content: Thought content normal.         Judgment: Judgment normal.         Assessment and Plan:  Diagnoses and all orders for this visit:    1. Annual physical exam (Primary)  -     Comprehensive Metabolic Panel  -     TSH  -     CBC & Differential    2. Encounter for screening mammogram for malignant neoplasm of breast  -     Mammo  Screening Digital Tomosynthesis Bilateral With CAD; Future    3. Need for Tdap vaccination  -     Tdap Vaccine Greater Than or Equal To 8yo IM    4. Other hyperlipidemia  -     Lipid Panel    5. Vitamin D deficiency  -     Vitamin D 25 hydroxy    6. B12 deficiency  -     Vitamin B12    7. Chronic cough  -     Fluticasone Furoate-Vilanterol 100-25 MCG/ACT aerosol powder ; Inhale 1 puff Daily.  Dispense: 1 each; Refill: 5    8. Unilateral emphysema  -     Fluticasone Furoate-Vilanterol 100-25 MCG/ACT aerosol powder ; Inhale 1 puff Daily.  Dispense: 1 each; Refill: 5    9. Tobacco abuse    10. Class 1 obesity due to excess calories with serious comorbidity and body mass index (BMI) of 34.0 to 34.9 in adult      Age appropriate screenings discussed.  Information added to AVS.    Discussed screening for common diseases.  Discussed timing of cervical cancer screening with pap smear and HPV testing - she is due for her next pap smear in 2026, but can complete sooner if concerns. Encouraged self-breast exams, clinical breast exams, and discussed timing of mammograms.  Baseline mammogram ordered.  Also discussed monitoring of blood pressure, lipids, blood sugars.  She is not fasting today and can return for fasting labs at her convenience.  Colon cancer screening not yet due - recommend starting screening age 45 if not already completed.  She is not interested in smoking cessation aids at this time.    BP stable today.  Refills of Breo written.    F/Y yearly or PRN.

## 2023-05-18 NOTE — PATIENT INSTRUCTIONS
You are due for adacel Tdap vaccination. (provides protection against tetanus, diptheria and whooping cough) Please  get the immunization at your local pharmacy at your earliest convenience. This immunization will next be due in 10 years. Please click on the link for more information about this vaccine.    [facility] Tdap Vaccine Information
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
## 2023-05-24 ENCOUNTER — OFFICE VISIT (OUTPATIENT)
Dept: PSYCHIATRY | Facility: CLINIC | Age: 43
End: 2023-05-24
Payer: COMMERCIAL

## 2023-05-24 DIAGNOSIS — F32.5 MAJOR DEPRESSIVE DISORDER WITH SINGLE EPISODE, IN FULL REMISSION: Chronic | ICD-10-CM

## 2023-05-24 DIAGNOSIS — F41.1 GENERALIZED ANXIETY DISORDER: Primary | ICD-10-CM

## 2023-05-30 ENCOUNTER — TELEPHONE (OUTPATIENT)
Dept: FAMILY MEDICINE CLINIC | Facility: CLINIC | Age: 43
End: 2023-05-30

## 2023-05-30 NOTE — TELEPHONE ENCOUNTER
Spoke to patient have been unable to get ahold of anyone in the lab will try again tomorrow to see if I can still add on this test.

## 2023-05-30 NOTE — TELEPHONE ENCOUNTER
Caller: Ally Condon    Relationship: Self    Best call back number: [phone]     What orders are you requesting (i.e. lab or imaging): LIPID PANEL TEST    In what timeframe would the patient need to come in: ASAP    Where will you receive your lab/imaging services: [facility] IN [place]    Additional notes: PATIENT CALLED TO ADVISE THAT SHE HAD THE LAB TO TAKE AN EXTRA VALVE OF BLOOD FOR THE LIPID PANEL THAT WAS NOT ON HER ORIGINAL ORDER.     PLEASE SEND THE ORDER FOR THE LIPID PANEL.    PLEASE CONTACT PATIENT TO ADVISE.      THANKS

## 2023-05-31 ENCOUNTER — TELEPHONE (OUTPATIENT)
Dept: FAMILY MEDICINE CLINIC | Facility: CLINIC | Age: 43
End: 2023-05-31

## 2023-05-31 NOTE — TELEPHONE ENCOUNTER
HUB TO READ:  ----- Message from Erlinda Saleh MD sent at 5/31/2023  2:54 PM EDT -----  Vitamin B12 and D are both low so take otc 1000 units of each daily

## 2023-05-31 NOTE — TELEPHONE ENCOUNTER
HUB TO READ:  ----- Message from Erlinda Saleh MD sent at 5/31/2023 12:11 PM EDT -----  Bad cholesterol was 85 and goal is to be closer to 70.  Some of the new standards if you have had blockage in your carotid go down as low as 55.  If she is done all she can with diet and exercise we could increase her atorvastatin to 40 please let us know

## 2023-06-07 ENCOUNTER — OFFICE VISIT (OUTPATIENT)
Dept: PSYCHIATRY | Facility: CLINIC | Age: 43
End: 2023-06-07
Payer: COMMERCIAL

## 2023-06-07 DIAGNOSIS — F32.5 MAJOR DEPRESSIVE DISORDER WITH SINGLE EPISODE, IN FULL REMISSION: Primary | Chronic | ICD-10-CM

## 2023-06-07 DIAGNOSIS — F41.1 GENERALIZED ANXIETY DISORDER: ICD-10-CM

## 2023-06-07 NOTE — PROGRESS NOTES
"Date: June 7, 2023  Time In: 1326  Time Out: 1409      PROGRESS NOTE  Data:  Ally Condon is a 42 y.o. female who presents today for individual therapy session at [facility] with AFTAB Suh, ERIK.     Patient Chief Complaint: Follow-up for depression and anxiety    Clinical Maneuvering/Intervention: Ally is pleasant, alert and oriented to person place and time.  Evaluated last session with EMDR.  She stated that the 48 hours afterwards were \"rough\" where she felt more emotional and cried more but adds that this was not a bad thing.  She would like to continue with another session of processing but did not want to do that today.  She talked about having a busy week with her kids and the stress involved with that.  She states that her  did start back on medication for his PTSD and this has helped them get along better.  She also talked about where she was at spiritually how she is seeking to know more about God and both Bible study and prayer and is seeking out a home [facility] to visit.    Assisted patient in processing above session content; acknowledged and normalized patient’s thoughts, feelings, and concerns. Rationalized patient thought process regarding exploring further her spirituality. Discussed triggers associated with patient's depression and anxiety. Also discussed coping skills for patient to implement such as continuing self stabilization skill.    Allowed patient to freely discuss issues without interruption or judgment. Provided safe, confidential environment to facilitate the development of positive therapeutic relationship and encourage open, honest communication. Assisted patient in identifying risk factors which would indicate the need for higher level of care including thoughts to harm self or others and/or self-harming behavior and encouraged patient to contact this office, call 911, or present to the nearest emergency room should any of these events occur. " Discussed crisis intervention services and means to access. Patient adamantly and convincingly denies current suicidal or homicidal ideation or perceptual disturbance.    Assessment   Patient appears to maintain relative stability as compared to their baseline. However, patient continues to struggle with depression and anxiety which continues to cause impairment in important areas of functioning. A result, they can be reasonably expected to continue to benefit from treatment and would likely be at increased risk for decompensation otherwise.    Mental Status Exam:   Hygiene:   good  Cooperation:  Cooperative  Eye Contact:  Good  Psychomotor Behavior:  Appropriate  Affect:  Appropriate  Mood: anxious, tearful  Speech:  Normal  Thought Process:  Goal directed and Linear  Thought Content:  Normal  Suicidal:  None  Homicidal:  None  Hallucinations:  None  Delusion:  None  Memory:  Intact  Orientation:  Person, Place, Time and Situation  Reliability:  good  Insight:  Good  Judgement:  Good  Impulse Control:  Good  Physical/Medical Issues:  See diagnosis list     PHQ-Score Total:  PHQ-9 Total Score: PHQ-9 Depression Screening  Little interest or pleasure in doing things? 2-->more than half the days   Feeling down, depressed, or hopeless? 1-->several days   Trouble falling or staying asleep, or sleeping too much? 1-->several days   Feeling tired or having little energy? 2-->more than half the days   Poor appetite or overeating? 0-->not at all   Feeling bad about yourself - or that you are a failure or have let yourself or your family down? 1-->several days   Trouble concentrating on things, such as reading the newspaper or watching television? 2-->more than half the days   Moving or speaking so slowly that other people could have noticed? Or the opposite - being so fidgety or restless that you have been moving around a lot more than usual? 1-->several days   Thoughts that you would be better off dead, or of hurting  yourself in some way? 0-->not at all   PHQ-9 Total Score 10   If you checked off any problems, how difficult have these problems made it for you to do your work, take care of things at home, or get along with other people?        JIMENA-7 Total Score:   Over the last two weeks, how often have you been bothered by the following problems?  Feeling nervous, anxious or on edge: More than half the days  Not being able to stop or control worrying: More than half the days  Worrying too much about different things: More than half the days  Trouble Relaxing: More than half the days  Being so restless that it is hard to sit still: More than half the days  Becoming easily annoyed or irritable: More than half the days  Feeling afraid as if something awful might happen: Several days  JIMENA 7 Total Score: 13     Patient's Support Network Includes:  children     Functional Status: Moderate impairment      Progress toward goal: Not at goal     Prognosis: Good with Ongoing Treatment        Plan     Resources: Patient was provided with the following community resources: None at this time    Patient will continue in individual outpatient therapy with focus on improved functioning and coping skills, maintaining stability, and avoiding decompensation and the need for higher level of care.    Patient will adhere to any medication regimens as prescribed and report any side effects. Patient will contact this office, call 911 or present to the nearest emergency room should suicidal or homicidal ideations occur. Provide cognitive behavioral therapy and solution focused therapy to improve functioning, maintain stability and avoid decompensation and the need for higher level of care.     Return in about 4-6 weeks, or earlier if symptoms worsen or fail to improve.           VISIT DIAGNOSIS:     ICD-10-CM ICD-9-CM   1. Major depressive disorder with single episode, in full remission  F32.5 296.26   2. Generalized anxiety disorder  F41.1 300.02             This document has been electronically signed by AFTAB Suh, VALEW   June 7, 2023 14:27 EDT      Part of this note may be an electronic transcription/translation of spoken language to printed text using the Dragon Dictation System.

## 2023-07-25 DIAGNOSIS — F32.1 CURRENT MODERATE EPISODE OF MAJOR DEPRESSIVE DISORDER WITHOUT PRIOR EPISODE: ICD-10-CM

## 2023-07-25 DIAGNOSIS — G43.109 MIGRAINE WITH AURA AND WITHOUT STATUS MIGRAINOSUS, NOT INTRACTABLE: ICD-10-CM

## 2023-07-25 DIAGNOSIS — F41.0 SEVERE ANXIETY WITH PANIC: Chronic | ICD-10-CM

## 2023-07-25 RX ORDER — AMITRIPTYLINE HYDROCHLORIDE 10 MG/1
10 TABLET, FILM COATED ORAL NIGHTLY
Qty: 30 TABLET | Refills: 0 | Status: SHIPPED | OUTPATIENT
Start: 2023-07-25

## 2023-07-25 NOTE — PROGRESS NOTES
Date: July 26, 2023  Time In: 1300  Time Out: 1401      PROGRESS NOTE  Data:  Ally Condon is a 42 y.o. female who presents today for individual therapy session at [facility] with AFTAB Suh LCSW.     Patient Chief Complaint: follow up for depression and anxiety     Clinical Maneuvering/Intervention: Ally is pleasant, alert and oriented to person, place and time.  She states that she has been very anxious.  Currently she is holding 2 jobs.  The 1 year anniversary of Chris's significant other dying is coming soon and this makes her nervous for herself and Chris.  She has also had difficulty setting boundaries with a coworker and her best friend.  Her goal is to move forward and grow as a person.    Assisted patient in processing above session content; acknowledged and normalized patient’s thoughts, feelings, and concerns. Rationalized patient thought process regarding setting boundaries and different situations and communicating in ways that people need in context of the situation and with boundary. Discussed triggers associated with patient's anxiety. Also discussed coping skills for patient to implement such as continue with self stabilization skills.    Allowed patient to freely discuss issues without interruption or judgment. Provided safe, confidential environment to facilitate the development of positive therapeutic relationship and encourage open, honest communication. Assisted patient in identifying risk factors which would indicate the need for higher level of care including thoughts to harm self or others and/or self-harming behavior and encouraged patient to contact this office, call 911, or present to the nearest emergency room should any of these events occur. Discussed crisis intervention services and means to access. Patient adamantly and convincingly denies current suicidal or homicidal ideation or perceptual disturbance.    Assessment   Patient appears to maintain  relative stability as compared to their baseline. However, patient continues to struggle with depression and anxiety which continues to cause impairment in important areas of functioning. A result, they can be reasonably expected to continue to benefit from treatment and would likely be at increased risk for decompensation otherwise.    Mental Status Exam:   Hygiene:   good  Cooperation:  Cooperative  Eye Contact:  Good  Psychomotor Behavior:  Appropriate  Affect:  Appropriate  Mood: Anxious  Speech:  Normal  Thought Process:  Goal directed and Linear  Thought Content:  Normal  Suicidal:  None  Homicidal:  None  Hallucinations:  None  Delusion:  None  Memory:  Intact  Orientation:  Person, Place, Time and Situation  Reliability:  good  Insight:  Good  Judgement:  Good  Impulse Control:  Good  Physical/Medical Issues:  See diagnosis list     PHQ-Score Total:  PHQ-9 Total Score: PHQ-9 Depression Screening  Little interest or pleasure in doing things? 1-->several days   Feeling down, depressed, or hopeless? 1-->several days   Trouble falling or staying asleep, or sleeping too much? 2-->more than half the days   Feeling tired or having little energy? 2-->more than half the days   Poor appetite or overeating? 1-->several days   Feeling bad about yourself - or that you are a failure or have let yourself or your family down? 1-->several days   Trouble concentrating on things, such as reading the newspaper or watching television? 2-->more than half the days   Moving or speaking so slowly that other people could have noticed? Or the opposite - being so fidgety or restless that you have been moving around a lot more than usual? 1-->several days   Thoughts that you would be better off dead, or of hurting yourself in some way? 0-->not at all   PHQ-9 Total Score 11   If you checked off any problems, how difficult have these problems made it for you to do your work, take care of things at home, or get along with other people?         JIMENA-7 Total Score:   Over the last two weeks, how often have you been bothered by the following problems?  Feeling nervous, anxious or on edge: More than half the days  Not being able to stop or control worrying: More than half the days  Worrying too much about different things: More than half the days  Trouble Relaxing: More than half the days  Being so restless that it is hard to sit still: More than half the days  Becoming easily annoyed or irritable: More than half the days  Feeling afraid as if something awful might happen: More than half the days  JIMENA 7 Total Score: 14     Patient's Support Network Includes:  children     Functional Status: Moderate impairment      Progress toward goal: Not at goal     Prognosis: Good with Ongoing Treatment          Plan     Resources: Patient was provided with the following community resources: None at this time     Patient will continue in individual outpatient therapy with focus on improved functioning and coping skills, maintaining stability, and avoiding decompensation and the need for higher level of care.    Patient will adhere to any medication regimens as prescribed and report any side effects. Patient will contact this office, call 911 or present to the nearest emergency room should suicidal or homicidal ideations occur. Provide cognitive behavioral therapy and solution focused therapy to improve functioning, maintain stability and avoid decompensation and the need for higher level of care.     Return in about 4-6 weeks, or earlier if symptoms worsen or fail to improve.           VISIT DIAGNOSIS:     ICD-10-CM ICD-9-CM   1. Major depressive disorder with single episode, in full remission  F32.5 296.26   2. Generalized anxiety disorder  F41.1 300.02            This document has been electronically signed by AFTAB Suh, ERIK   July 26, 2023 16:24 EDT      Part of this note may be an electronic transcription/translation of spoken language to printed text  using the Dragon Dictation System.

## 2023-07-26 ENCOUNTER — OFFICE VISIT (OUTPATIENT)
Dept: PSYCHIATRY | Facility: CLINIC | Age: 43
End: 2023-07-26
Payer: COMMERCIAL

## 2023-07-26 DIAGNOSIS — F32.5 MAJOR DEPRESSIVE DISORDER WITH SINGLE EPISODE, IN FULL REMISSION: Primary | Chronic | ICD-10-CM

## 2023-07-26 DIAGNOSIS — F41.1 GENERALIZED ANXIETY DISORDER: ICD-10-CM
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
## 2023-08-15 ENCOUNTER — OFFICE VISIT (OUTPATIENT)
Dept: PSYCHIATRY | Facility: CLINIC | Age: 43
End: 2023-08-15
Payer: COMMERCIAL

## 2023-08-15 DIAGNOSIS — F41.1 GENERALIZED ANXIETY DISORDER: Primary | ICD-10-CM

## 2023-08-15 DIAGNOSIS — F33.1 MAJOR DEPRESSIVE DISORDER, RECURRENT EPISODE, MODERATE: ICD-10-CM

## 2023-08-15 PROCEDURE — 90837 PSYTX W PT 60 MINUTES: CPT | Performed by: SOCIAL WORKER

## 2023-08-21 DIAGNOSIS — F41.0 SEVERE ANXIETY WITH PANIC: Chronic | ICD-10-CM

## 2023-08-21 DIAGNOSIS — F32.1 CURRENT MODERATE EPISODE OF MAJOR DEPRESSIVE DISORDER WITHOUT PRIOR EPISODE: ICD-10-CM

## 2023-08-21 DIAGNOSIS — G43.109 MIGRAINE WITH AURA AND WITHOUT STATUS MIGRAINOSUS, NOT INTRACTABLE: ICD-10-CM

## 2023-08-21 RX ORDER — AMITRIPTYLINE HYDROCHLORIDE 10 MG/1
10 TABLET, FILM COATED ORAL NIGHTLY
Qty: 30 TABLET | Refills: 0 | Status: SHIPPED | OUTPATIENT
Start: 2023-08-21

## 2023-08-21 NOTE — TELEPHONE ENCOUNTER
Rx Refill Note  Requested Prescriptions     Pending Prescriptions Disp Refills    amitriptyline (ELAVIL) 10 MG tablet [Pharmacy Med Name: AMITRIPTYLINE 10MG TABLETS] 30 tablet 0     Sig: TAKE 1 TABLET BY MOUTH EVERY NIGHT      Last office visit with prescribing clinician: 8/5/2022      Next office visit with prescribing clinician: Visit date not found   3}  Fany Fragoso  08/21/23, 09:21 EDT

## 2023-09-14 DIAGNOSIS — F41.1 GENERALIZED ANXIETY DISORDER: Chronic | ICD-10-CM

## 2023-09-14 RX ORDER — CLONAZEPAM 0.5 MG/1
0.5 TABLET ORAL 3 TIMES DAILY PRN
Qty: 90 TABLET | Refills: 1 | Status: SHIPPED | OUTPATIENT
Start: 2023-09-14

## 2023-09-14 NOTE — TELEPHONE ENCOUNTER
Rx Refill Note  Requested Prescriptions     Pending Prescriptions Disp Refills    clonazePAM (KlonoPIN) 0.5 MG tablet 90 tablet 3     Sig: Take 1 tablet by mouth 3 (Three) Times a Day As Needed for Anxiety.        Last office visit with prescribing clinician: 5/11/2023     Next office visit with prescribing clinician: 10/10/2023     Office Visit with Tammi Fraire APRN (05/11/2023)     [facility] Full Urine Drug Screen - (03/16/2023)     Inspect Fill 8/17/23    Jeimy Mazariegos  09/14/23, 09:35 EDT

## 2023-09-20 ENCOUNTER — OFFICE VISIT (OUTPATIENT)
Dept: PSYCHIATRY | Facility: CLINIC | Age: 43
End: 2023-09-20
Payer: COMMERCIAL

## 2023-09-20 DIAGNOSIS — F33.1 MAJOR DEPRESSIVE DISORDER, RECURRENT EPISODE, MODERATE: ICD-10-CM

## 2023-09-20 DIAGNOSIS — F41.1 GENERALIZED ANXIETY DISORDER: Primary | ICD-10-CM
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
## 2023-09-28 DIAGNOSIS — F32.1 CURRENT MODERATE EPISODE OF MAJOR DEPRESSIVE DISORDER WITHOUT PRIOR EPISODE: ICD-10-CM

## 2023-09-28 DIAGNOSIS — F41.0 SEVERE ANXIETY WITH PANIC: Chronic | ICD-10-CM

## 2023-09-28 DIAGNOSIS — G43.109 MIGRAINE WITH AURA AND WITHOUT STATUS MIGRAINOSUS, NOT INTRACTABLE: ICD-10-CM

## 2023-09-28 RX ORDER — AMITRIPTYLINE HYDROCHLORIDE 10 MG/1
10 TABLET, FILM COATED ORAL NIGHTLY
Qty: 30 TABLET | Refills: 0 | Status: SHIPPED | OUTPATIENT
Start: 2023-09-28

## 2023-10-04 NOTE — PROGRESS NOTES
Date: October 5, 2023  Time In: 0800  Time Out: 0905      PROGRESS NOTE  Data:  Ally Condon is a 42 y.o. female who presents today for individual therapy session at [facility] with AFTAB Shu LCSW.     Patient Chief Complaint: Follow-up for depression and anxiety    Clinical Maneuvering/Intervention: Ally is pleasant alert and oriented to person place and time.  She talked about having a job interview later today and the anxiety she feels with this.  She discussed at length the advantages and disadvantages of both jobs and her thinking through which job would be better to take.    Assisted patient in processing above session content; acknowledged and normalized patient’s thoughts, feelings, and concerns. Rationalized patient thought process regarding recognizing the strength that she has within herself. Discussed triggers associated with patient's anxiety. Also discussed coping skills for patient to implement such as continuing with skills already built and adding relaxation for today before her job interview.    Allowed patient to freely discuss issues without interruption or judgment. Provided safe, confidential environment to facilitate the development of positive therapeutic relationship and encourage open, honest communication. Assisted patient in identifying risk factors which would indicate the need for higher level of care including thoughts to harm self or others and/or self-harming behavior and encouraged patient to contact this office, call 911, or present to the nearest emergency room should any of these events occur. Discussed crisis intervention services and means to access. Patient adamantly and convincingly denies current suicidal or homicidal ideation or perceptual disturbance.    Assessment   Patient appears to maintain relative stability as compared to their baseline. However, patient continues to struggle with depression and anxiety which continues to cause  impairment in important areas of functioning. A result, they can be reasonably expected to continue to benefit from treatment and would likely be at increased risk for decompensation otherwise.    Mental Status Exam:   Hygiene:   good  Cooperation:  Cooperative  Eye Contact:  Good  Psychomotor Behavior:  Appropriate  Affect:  Appropriate  Mood: Anxious  Speech:  Normal  Thought Process:  Goal directed and Linear  Thought Content:  Normal  Suicidal:  None  Homicidal:  None  Hallucinations:  None  Delusion:  None  Memory:  Intact  Orientation:  Person, Place, Time and Situation  Reliability:  good  Insight:  Good  Judgement:  Good  Impulse Control:  Good  Physical/Medical Issues:  See diagnosis list     PHQ-Score Total:  PHQ-9 Total Score: PHQ-9 Depression Screening  Little interest or pleasure in doing things? 1-->several days   Feeling down, depressed, or hopeless? 1-->several days   Trouble falling or staying asleep, or sleeping too much? 1-->several days   Feeling tired or having little energy? 2-->more than half the days   Poor appetite or overeating? 1-->several days   Feeling bad about yourself - or that you are a failure or have let yourself or your family down? 2-->more than half the days   Trouble concentrating on things, such as reading the newspaper or watching television? 1-->several days   Moving or speaking so slowly that other people could have noticed? Or the opposite - being so fidgety or restless that you have been moving around a lot more than usual? 0-->not at all   Thoughts that you would be better off dead, or of hurting yourself in some way?     PHQ-9 Total Score 9   If you checked off any problems, how difficult have these problems made it for you to do your work, take care of things at home, or get along with other people?        JIMENA-7 Total Score:   Over the last two weeks, how often have you been bothered by the following problems?  Feeling nervous, anxious or on edge: Nearly every day  Not  being able to stop or control worrying: More than half the days  Worrying too much about different things: More than half the days  Trouble Relaxing: More than half the days  Being so restless that it is hard to sit still: Nearly every day  Becoming easily annoyed or irritable: More than half the days  Feeling afraid as if something awful might happen: Several days  JIMENA 7 Total Score: 15       Patient's Support Network Includes:  children     Functional Status: Moderate impairment      Progress toward goal: Not at goal     Prognosis: Good with Ongoing Treatment            Plan     Resources: Patient was provided with the following community resources: None at this time     Patient will continue in individual outpatient therapy with focus on improved functioning and coping skills, maintaining stability, and avoiding decompensation and the need for higher level of care.    Patient will adhere to any medication regimens as prescribed and report any side effects. Patient will contact this office, call 911 or present to the nearest emergency room should suicidal or homicidal ideations occur. Provide cognitive behavioral therapy and solution focused therapy to improve functioning, maintain stability and avoid decompensation and the need for higher level of care.     Return in about 4-6 weeks, or earlier if symptoms worsen or fail to improve.           VISIT DIAGNOSIS:     ICD-10-CM ICD-9-CM   1. Generalized anxiety disorder  F41.1 300.02   2. Major depressive disorder, recurrent episode, moderate  F33.1 296.32            This document has been electronically signed by AFTAB Suh, ERIK   October 5, 2023 12:12 EDT      Part of this note may be an electronic transcription/translation of spoken language to printed text using the Dragon Dictation System.

## 2023-10-05 ENCOUNTER — OFFICE VISIT (OUTPATIENT)
Dept: PSYCHIATRY | Facility: CLINIC | Age: 43
End: 2023-10-05
Payer: COMMERCIAL

## 2023-10-05 DIAGNOSIS — F41.1 GENERALIZED ANXIETY DISORDER: Primary | ICD-10-CM

## 2023-10-05 DIAGNOSIS — F33.1 MAJOR DEPRESSIVE DISORDER, RECURRENT EPISODE, MODERATE: ICD-10-CM

## 2023-10-05 NOTE — PROGRESS NOTES
Subjective   Ally Condon is a 42 y.o. female who presents today for follow-up for psychiatric medication management.      Chief Complaint:  Follow up for depression, anxiety, and insomnia.      History of Present Illness:     Medication adjustments last visit:   Continue Vilazodone 20mg daily.   Continue clonazepam 0.5mg TID PRN for anxiety.      She has been doing better mood wise. She has been seeing Seema for counseling.   She states sleep is poor. She often lays awake at night with anxiety.   She takes clonazepam 3 times a day. She feels like it doesn't help as much as it used to. She would like to increase the dose. We discussed increasing to 0.75mg three times a day PRN anxiety.   Will leave vilazodone the same.   Denies any suicidal ideation.      Previous visit:   At today's visit, patient states she has a lot of circumstantial things going on right now which are making her depression and anxiety worse.   Her 15 yr old daughter got sick in [place]. She is having GI problems.They are going to do testing to rule out colon cancer. She has lost a lot of weight, etc. They are waiting for referral appointments to specialists. This has the patient's anxiety really high.   No suicidal thoughts. No HI/AVH.   She is in counseling with Seema still and she states that is going well.   We discussed leaving medications the same for now.     Ability and capacity to respond to treatment: good  Functional status: good  Prognosis: good  Long term goals: improve depression and overall quality of life.   Short term goals:improve sleep.   Strengths: Patient is compliant with appointments and medications.    Weaknesses: Patient can be self-critical at times.     Patient presents with symptoms and behaviors that are consistent with the following DSM-5 diagnoses:  Major depressive disorder  2. Generalized anxiety disorder  3. Panic disorder  4. Insomnia    The following portions of the patient's history were reviewed and  "updated as appropriate: allergies, current medications, past family history, past medical history, past social history, past surgical history and problem list.    PAST OUTPATIENT TREATMENT  Diagnosis treated:  Affective Disorder, Anxiety/Panic Disorder  Treatment Type:  Medication Management  Prior Psychiatric Medications:  Xanax - effective  Klonopin - effective in the past  Buspirone - ineffective after 2-3 months at uncertain dose.  Hydroxyzine - ineffective at 10mg  Amitriptyline  Lexapro - ineffective  Wellbutrin - ineffective  Pristiq - made her \"too happy,\" didn't like how it made her feel hot like BP increased.  Support Groups:  None  Sequelae Of Mental Disorder:  emotional distress    Interval History  Improved    Side Effects  None      Past Medical History:  Past Medical History:   Diagnosis Date    Abnormal ECG 08/2021    Aneurysm of left internal carotid artery 05/13/2012    Anxiety     Depression     Generalized anxiety disorder 2/2/2023    Heart murmur     Hypertension 2011    Other hyperlipidemia 2011    Right internal carotid artery aneurysm 12/11/2014    Unilateral emphysema        Social History:  Social History     Socioeconomic History    Marital status:    Tobacco Use    Smoking status: Every Day     Packs/day: 0.50     Years: 20.00     Additional pack years: 0.00     Total pack years: 10.00     Types: Cigarettes    Smokeless tobacco: Never   Vaping Use    Vaping Use: Never used   Substance and Sexual Activity    Alcohol use: Yes     Comment: occassionally    Drug use: No    Sexual activity: Yes     Partners: Male     Birth control/protection: None, Tubal ligation       Family History:  Family History   Problem Relation Age of Onset    Anxiety disorder Maternal Grandmother        Past Surgical History:  Past Surgical History:   Procedure Laterality Date    APPENDECTOMY      ARTERIAL ANEURYSM REPAIR      Brain    BRAIN SURGERY      CEREBRAL ANGIOGRAM Right 03/17/2022    Sergio " [facility]     SECTION      x2    CHOLECYSTECTOMY      COLON SURGERY      COLONOSCOPY      SMALL INTESTINE SURGERY      TONSILLECTOMY         Problem List:  Patient Active Problem List   Diagnosis    Aneurysm of left internal carotid artery    Right internal carotid artery aneurysm    Body mass index (BMI) of 24.0-24.9 in adult    Contact allergic reaction    Headache    Hidradenitis suppurativa    Hx of pancreatitis    Migraine    Tobacco abuse    Urticaria    Severe anxiety with panic    Unilateral emphysema    Right groin pain    Obesity    Class 1 obesity due to excess calories with serious comorbidity and body mass index (BMI) of 33.0 to 33.9 in adult    Hypertension    Other hyperlipidemia    Right elbow pain    Major depressive disorder with single episode, in full remission    Intractable right heel pain    Insomnia due to other mental disorder    Generalized anxiety disorder       Allergy:   Allergies   Allergen Reactions    Penicillins Other (See Comments)     Childhood reaction: reaction unknown, but patient was hospitalized as a result.      Iodinated Contrast Media Hives     Per states she is not allergic to iodinated diagnostic agents.         Discontinued Medications:  There are no discontinued medications.      Current Medications:   Current Outpatient Medications   Medication Sig Dispense Refill    Acetaminophen (Tylenol) 325 MG capsule Take  by mouth.      adapalene (DIFFERIN) 0.3 % gel APPLY TOPICALLY TO THE AFFECTED AREA EVERY MORNING      albuterol sulfate  (90 Base) MCG/ACT inhaler INHALE 2 PUFFS BY MOUTH EVERY 4 HOURS AS NEEDED FOR WHEEZING 8.5 g 0    amitriptyline (ELAVIL) 10 MG tablet TAKE 1 TABLET BY MOUTH EVERY NIGHT 30 tablet 0    aspirin 81 MG chewable tablet Chew 1 tablet Daily.      atorvastatin (LIPITOR) 20 MG tablet TAKE 1 TABLET BY MOUTH DAILY 90 tablet 3    cetirizine (zyrTEC) 10 MG tablet TAKE 1 TABLET BY MOUTH EVERY DAY 90 tablet 3    clonazePAM (KlonoPIN) 0.5 MG  tablet Take 1 tablet by mouth 3 (Three) Times a Day As Needed for Anxiety. 90 tablet 1    clonazePAM (KlonoPIN) 0.5 MG tablet Take 1.5 tablets by mouth 3 (Three) Times a Day As Needed for Anxiety. 135 tablet 2    famotidine (PEPCID) 20 MG tablet TAKE 1 TABLET BY MOUTH DAILY 30 tablet 6    Fluticasone Furoate-Vilanterol 100-25 MCG/ACT aerosol powder  Inhale 1 puff Daily. 1 each 5    hydrOXYzine (ATARAX) 25 MG tablet TAKE 1 TABLET BY MOUTH THREE TIMES DAILY AS NEEDED FOR ANXIETY 90 tablet 1    Melatonin 10 MG tablet Take 1 tablet by mouth Every Night.      metoprolol tartrate (LOPRESSOR) 25 MG tablet TAKE 1 TABLET BY MOUTH TWICE DAILY 180 tablet 3    ondansetron (Zofran) 4 MG tablet Take 1 tablet by mouth Every 8 (Eight) Hours As Needed for Nausea or Vomiting. 30 tablet 1    prochlorperazine (COMPAZINE) 10 MG tablet TAKE 1 TABLET BY MOUTH TWICE DAILY AS NEEDED FOR HEADACHE      QUEtiapine (SEROquel) 50 MG tablet Take 1 tablet by mouth Every Night. 30 tablet 2    tolterodine LA (DETROL LA) 2 MG 24 hr capsule Take 1 capsule by mouth 2 (Two) Times a Day.      tretinoin (RETIN-A) 0.1 % cream APPLY PEA SIZED AMOUNT TOPICALLY TO THE AFFECTED AREA AT BEDTIME      vilazodone (VIIBRYD) 20 MG tablet tablet Take 1 tablet by mouth Daily. 90 tablet 3     No current facility-administered medications for this visit.         Psychological ROS: positive for - anxiety, depression and sleep disturbances  negative for - behavioral disorder, concentration difficulties, decreased libido, disorientation, hallucinations, hostility, irritability, memory difficulties, mood swings, obsessive thoughts, physical abuse, sexual abuse or suicidal ideation      Physical Exam:   not currently breastfeeding.    Mental Status Exam:   Hygiene:   good  Cooperation:  Cooperative  Eye Contact:  Good  Psychomotor Behavior:  Appropriate  Affect:  Appropriate  Mood: euthymic  Hopelessness: Denies  Speech:  Normal  Thought Process:  Goal directed and  Linear  Thought Content:  Normal  Suicidal:  None  Homicidal:  None  Hallucinations:  None  Delusion:  None  Memory:  Intact  Orientation:  Person, Place, Time and Situation  Reliability:  good  Insight:  Good  Judgement:  Good  Impulse Control:  Good  Physical/Medical Issues:  No      Mental status exam was reviewed and compared to visit on 5/11/23 and appropriate updates were made.     PHQ-9 Depression Screening    Little interest or pleasure in doing things? 1-->several days   Feeling down, depressed, or hopeless? 1-->several days   Trouble falling or staying asleep, or sleeping too much? 2-->more than half the days   Feeling tired or having little energy? 2-->more than half the days   Poor appetite or overeating? 0-->not at all   Feeling bad about yourself - or that you are a failure or have let yourself or your family down? 1-->several days   Trouble concentrating on things, such as reading the newspaper or watching television? 2-->more than half the days   Moving or speaking so slowly that other people could have noticed? Or the opposite - being so fidgety or restless that you have been moving around a lot more than usual? 2-->more than half the days   Thoughts that you would be better off dead, or of hurting yourself in some way? 0-->not at all   PHQ-9 Total Score 11   If you checked off any problems, how difficult have these problems made it for you to do your work, take care of things at home, or get along with other people? very difficult        Current every day smoker less than 3 minutes spent counseling Not agreeable to stopping    I advised Ally of the risks of tobacco use.     Result Review:    Labs:  No visits with results within 3 Month(s) from this visit.   Latest known visit with results is:   Office Visit on 08/05/2022   Component Date Value Ref Range Status    Color 08/05/2022 Yellow  Yellow, Straw, Dark Yellow, Prisca Final    Clarity, UA 08/05/2022 Clear  Clear Final    Specific Gravity   08/05/2022 1.015  1.005 - 1.030 Final    pH, Urine 08/05/2022 6.5  5.0 - 8.0 Final    Leukocytes 08/05/2022 Negative  Negative Final    Nitrite, UA 08/05/2022 Negative  Negative Final    Protein, POC 08/05/2022 Negative  Negative mg/dL Final    Glucose, UA 08/05/2022 Negative  Negative mg/dL Final    Ketones, UA 08/05/2022 Negative  Negative Final    Urobilinogen, UA 08/05/2022 Normal  Normal Final    Bilirubin 08/05/2022 Negative  Negative Final    Blood, UA 08/05/2022 Negative  Negative Final    Lot Number 08/05/2022 [id number]   Final    Expiration Date 08/05/2022 02/28/2023   Final    COVID19 08/05/2022 Not Detected  Not Detected - Ref. Range Final       Assessment & Plan   Diagnoses and all orders for this visit:    1. Generalized anxiety disorder (Primary)  -     clonazePAM (KlonoPIN) 0.5 MG tablet; Take 1.5 tablets by mouth 3 (Three) Times a Day As Needed for Anxiety.  Dispense: 135 tablet; Refill: 2    2. Panic disorder  -     clonazePAM (KlonoPIN) 0.5 MG tablet; Take 1.5 tablets by mouth 3 (Three) Times a Day As Needed for Anxiety.  Dispense: 135 tablet; Refill: 2    3. Major depressive disorder, recurrent episode, moderate    4. Insomnia due to other mental disorder           Continue Vilazodone 20mg daily. No refill needed today.  Continue clonazepam, increase to 0.75mg TID PRN for anxiety.      Visit Diagnoses:    ICD-10-CM ICD-9-CM   1. Generalized anxiety disorder  F41.1 300.02   2. Panic disorder  F41.0 300.01   3. Major depressive disorder, recurrent episode, moderate  F33.1 296.32   4. Insomnia due to other mental disorder  F51.05 300.9    F99 327.02         TREATMENT PLAN/GOALS: Continue supportive psychotherapy efforts and medications as indicated. Treatment and medication options discussed during today's visit. Patient ackowledged and verbally consented to continue with current treatment plan and was educated on the importance of compliance with treatment and follow-up appointments.    MEDICATION  ISSUES:  INSPECT reviewed as expected.    UDS on 3/16/23 consistent with prescribed medications.     Discussed medication options and treatment plan of prescribed medication as well as the risks, benefits, and side effects including potential falls, possible impaired driving and metabolic adversities among others. Patient is agreeable to call the office with any worsening of symptoms or onset of side effects. Patient is agreeable to call 911 or go to the nearest ER should he/she begin having SI/HI. No medication side effects or related complaints today.     MEDS ORDERED DURING VISIT:  New Medications Ordered This Visit   Medications    clonazePAM (KlonoPIN) 0.5 MG tablet     Sig: Take 1.5 tablets by mouth 3 (Three) Times a Day As Needed for Anxiety.     Dispense:  135 tablet     Refill:  2       Return in about 3 months (around 1/10/2024).         This document has been electronically signed by JOHANNY Otoole  October 10, 2023 13:55 EDT    Part of this note may be an electronic transcription/translation of spoken language to printed text using the Dragon Dictation System.

## 2023-10-10 ENCOUNTER — OFFICE VISIT (OUTPATIENT)
Dept: PSYCHIATRY | Facility: CLINIC | Age: 43
End: 2023-10-10
Payer: COMMERCIAL

## 2023-10-10 DIAGNOSIS — F51.05 INSOMNIA DUE TO OTHER MENTAL DISORDER: Chronic | ICD-10-CM

## 2023-10-10 DIAGNOSIS — F33.1 MAJOR DEPRESSIVE DISORDER, RECURRENT EPISODE, MODERATE: Chronic | ICD-10-CM

## 2023-10-10 DIAGNOSIS — F41.1 GENERALIZED ANXIETY DISORDER: Primary | Chronic | ICD-10-CM

## 2023-10-10 DIAGNOSIS — F41.0 PANIC DISORDER: Chronic | ICD-10-CM

## 2023-10-10 DIAGNOSIS — F99 INSOMNIA DUE TO OTHER MENTAL DISORDER: Chronic | ICD-10-CM

## 2023-10-10 RX ORDER — CLONAZEPAM 0.5 MG/1
0.75 TABLET ORAL 3 TIMES DAILY PRN
Qty: 135 TABLET | Refills: 2 | Status: SHIPPED | OUTPATIENT
Start: 2023-10-10 | End: 2023-10-16 | Stop reason: SDUPTHER

## 2023-10-16 ENCOUNTER — TELEPHONE (OUTPATIENT)
Dept: PSYCHIATRY | Facility: CLINIC | Age: 43
End: 2023-10-16
Payer: COMMERCIAL

## 2023-10-16 DIAGNOSIS — F41.1 GENERALIZED ANXIETY DISORDER: Chronic | ICD-10-CM

## 2023-10-16 DIAGNOSIS — F41.0 PANIC DISORDER: Chronic | ICD-10-CM

## 2023-10-16 RX ORDER — CLONAZEPAM 0.5 MG/1
0.75 TABLET ORAL 3 TIMES DAILY PRN
Qty: 135 TABLET | Refills: 2 | Status: SHIPPED | OUTPATIENT
Start: 2023-10-16

## 2023-10-16 NOTE — TELEPHONE ENCOUNTER
Pharmacy notifying us that they do have any 0.5mg tablets of the clonazepam. Only 1 mg 2 mg tablets available.    What would like them to do?

## 2023-10-16 NOTE — PATIENT INSTRUCTIONS
Health Maintenance Due   Topic Date Due    COVID-19 Vaccine (1) Never done    Pneumococcal Vaccine 0-64 (2 - PCV) 02/05/2022    BMI FOLLOWUP  08/03/2022    LIPID PANEL  03/30/2023    INFLUENZA VACCINE  08/01/2023    Check blood pressure cuff for accuracy and send 10 blood pressures over 2 weeks.  Watch sodium, alcohol and weight.

## 2023-10-17 ENCOUNTER — OFFICE VISIT (OUTPATIENT)
Dept: FAMILY MEDICINE CLINIC | Facility: CLINIC | Age: 43
End: 2023-10-17
Payer: COMMERCIAL

## 2023-10-17 VITALS
HEART RATE: 58 BPM | DIASTOLIC BLOOD PRESSURE: 84 MMHG | SYSTOLIC BLOOD PRESSURE: 131 MMHG | BODY MASS INDEX: 34.22 KG/M2 | HEIGHT: 60 IN | TEMPERATURE: 97.7 F | OXYGEN SATURATION: 99 % | WEIGHT: 174.3 LBS

## 2023-10-17 DIAGNOSIS — F41.0 SEVERE ANXIETY WITH PANIC: Chronic | ICD-10-CM

## 2023-10-17 DIAGNOSIS — I67.1 RIGHT INTERNAL CAROTID ARTERY ANEURYSM: ICD-10-CM

## 2023-10-17 DIAGNOSIS — I67.1 ANEURYSM OF LEFT INTERNAL CAROTID ARTERY: ICD-10-CM

## 2023-10-17 DIAGNOSIS — E53.8 VITAMIN B12 DEFICIENCY: ICD-10-CM

## 2023-10-17 DIAGNOSIS — M25.551 RIGHT HIP PAIN: ICD-10-CM

## 2023-10-17 DIAGNOSIS — M54.41 ACUTE MIDLINE LOW BACK PAIN WITH RIGHT-SIDED SCIATICA: ICD-10-CM

## 2023-10-17 DIAGNOSIS — E66.09 CLASS 1 OBESITY DUE TO EXCESS CALORIES WITH SERIOUS COMORBIDITY AND BODY MASS INDEX (BMI) OF 34.0 TO 34.9 IN ADULT: ICD-10-CM

## 2023-10-17 DIAGNOSIS — E55.9 VITAMIN D DEFICIENCY: ICD-10-CM

## 2023-10-17 DIAGNOSIS — J43.0 UNILATERAL EMPHYSEMA: ICD-10-CM

## 2023-10-17 DIAGNOSIS — E78.49 OTHER HYPERLIPIDEMIA: ICD-10-CM

## 2023-10-17 DIAGNOSIS — N95.1 PERIMENOPAUSE: ICD-10-CM

## 2023-10-17 DIAGNOSIS — Z72.0 TOBACCO ABUSE: Primary | ICD-10-CM

## 2023-10-17 DIAGNOSIS — I10 PRIMARY HYPERTENSION: ICD-10-CM

## 2023-10-17 DIAGNOSIS — G43.109 MIGRAINE WITH AURA AND WITHOUT STATUS MIGRAINOSUS, NOT INTRACTABLE: ICD-10-CM

## 2023-10-17 RX ORDER — AZITHROMYCIN 250 MG/1
TABLET, FILM COATED ORAL
Qty: 6 TABLET | Refills: 0 | Status: SHIPPED | OUTPATIENT
Start: 2023-10-17

## 2023-10-17 RX ORDER — BENZONATATE 100 MG/1
100 CAPSULE ORAL 3 TIMES DAILY PRN
Qty: 30 CAPSULE | Refills: 0 | Status: SHIPPED | OUTPATIENT
Start: 2023-10-17

## 2023-10-17 RX ORDER — NICOTINE 21 MG/24HR
1 PATCH, TRANSDERMAL 24 HOURS TRANSDERMAL EVERY 24 HOURS
Qty: 30 PATCH | Refills: 2 | Status: SHIPPED | OUTPATIENT
Start: 2023-10-17

## 2023-10-17 RX ORDER — DIPHENHYDRAMINE HYDROCHLORIDE 25 MG/1
1 CAPSULE, LIQUID FILLED ORAL DAILY
Qty: 1 EACH | Refills: 0 | Status: SHIPPED | OUTPATIENT
Start: 2023-10-17

## 2023-10-17 NOTE — PROGRESS NOTES
Subjective   Ally Condon is a 42 y.o. female presents for   Chief Complaint   Patient presents with    Back Pain     Has been to chiropractor and no relief. Interested in steroid or cortisone shots (referral to pain mngt?)    hormone     Would like to get her hormone checked     Med Refill     Ztejackelinan       Health Maintenance Due   Topic Date Due    COVID-19 Vaccine (1) Never done    Pneumococcal Vaccine 0-64 (2 - PCV) 02/05/2022    BMI FOLLOWUP  08/03/2022    LIPID PANEL  03/30/2023    INFLUENZA VACCINE  08/01/2023     Ally Condon presents for evaluation of tobacco use, emphysema, severe anxiety with panic, right internal carotid artery aneurysm, aneurysm of the left internal carotid artery, hyperlipidemia, migraine, hypertension, vitamin D deficiency, BMI of 34, and class 1 obesity with serious comorbidities. She has consented to the use of KASHIF.     The patient requests to have her hormone levels evaluated. She states we have previously discussed her perimenopausal status. She reports decreased libido, increased irritability, and erratic periods. Her siblings have not been through menopause, and she is unsure when her mother went through this. She states her aunts experienced this in their 40s.     The patient reports sleep disturbance.     The patient states she will have her carotids evaluated in 11/2023.     The patient reports cough, onset 14 days. She describes the cough as productive, with the production being yellow and green in color. She denies fever.     The patient states since she has been attending therapy and taking anxiety medication, her headaches have decreased, but are still present.     The patient reports back pain. She states she has lost bladder control, and has experienced this before. She is seeing a chiropractor. She denies the back pain beginning with work related activity. She reports radiating pain to the right hip, groin, and leg. She reports nausea, but correlates this to  "coughing. She denies vomiting.     The patient has decreased her smoking to 0.5 pack per day. She is also vaping. She inquires a nicotine patch and lozenge.     The patient is allergic to PENICILLINS, IODINATED CONTRAST MEDIA.     The patient is currently taking Klonopin, Viibryd, inhaler.     Vitals:    10/17/23 1529 10/17/23 1531   BP: 147/92 131/84   BP Location: Right arm Left arm   Patient Position: Sitting Sitting   Cuff Size: Adult Adult   Pulse: 58    Temp: 98.6 °F (37 °C) 97.7 °F (36.5 °C)   TempSrc: Tympanic Tympanic   SpO2: 99%    Weight:  79.1 kg (174 lb 4.8 oz)   Height:  152.4 cm (60\")     Body mass index is 34.04 kg/m².    Current Outpatient Medications on File Prior to Visit   Medication Sig Dispense Refill    Acetaminophen (Tylenol) 325 MG capsule Take  by mouth.      adapalene (DIFFERIN) 0.3 % gel APPLY TOPICALLY TO THE AFFECTED AREA EVERY MORNING      albuterol sulfate  (90 Base) MCG/ACT inhaler INHALE 2 PUFFS BY MOUTH EVERY 4 HOURS AS NEEDED FOR WHEEZING 8.5 g 0    amitriptyline (ELAVIL) 10 MG tablet TAKE 1 TABLET BY MOUTH EVERY NIGHT 30 tablet 0    atorvastatin (LIPITOR) 20 MG tablet TAKE 1 TABLET BY MOUTH DAILY 90 tablet 3    cetirizine (zyrTEC) 10 MG tablet TAKE 1 TABLET BY MOUTH EVERY DAY 90 tablet 3    clonazePAM (KlonoPIN) 0.5 MG tablet Take 1.5 tablets by mouth 3 (Three) Times a Day As Needed for Anxiety. 135 tablet 2    famotidine (PEPCID) 20 MG tablet TAKE 1 TABLET BY MOUTH DAILY 30 tablet 6    Fluticasone Furoate-Vilanterol 100-25 MCG/ACT aerosol powder  Inhale 1 puff Daily. 1 each 5    hydrOXYzine (ATARAX) 25 MG tablet TAKE 1 TABLET BY MOUTH THREE TIMES DAILY AS NEEDED FOR ANXIETY 90 tablet 1    Melatonin 10 MG tablet Take 1 tablet by mouth Every Night.      metoprolol tartrate (LOPRESSOR) 25 MG tablet TAKE 1 TABLET BY MOUTH TWICE DAILY 180 tablet 3    ondansetron (Zofran) 4 MG tablet Take 1 tablet by mouth Every 8 (Eight) Hours As Needed for Nausea or Vomiting. 30 tablet 1    " tolterodine LA (DETROL LA) 2 MG 24 hr capsule Take 1 capsule by mouth 2 (Two) Times a Day.      tretinoin (RETIN-A) 0.1 % cream APPLY PEA SIZED AMOUNT TOPICALLY TO THE AFFECTED AREA AT BEDTIME      vilazodone (VIIBRYD) 20 MG tablet tablet Take 1 tablet by mouth Daily. 90 tablet 3     No current facility-administered medications on file prior to visit.       The following portions of the patient's history were reviewed and updated as appropriate: allergies, current medications, past family history, past medical history, past social history, past surgical history, and problem list.    Review of Systems   Constitutional:  Negative for fever.   Respiratory:  Positive for cough.    Gastrointestinal:  Positive for nausea. Negative for vomiting.   Genitourinary:  Positive for menstrual problem and urinary incontinence.   Musculoskeletal:  Positive for arthralgias, back pain and myalgias.   Neurological:  Positive for headache.   Psychiatric/Behavioral:  Positive for agitation and sleep disturbance. The patient is nervous/anxious.        Objective   Physical Exam  Vitals reviewed.   Constitutional:       General: She is not in acute distress.     Appearance: Normal appearance. She is well-developed. She is not ill-appearing or toxic-appearing.   HENT:      Head: Normocephalic and atraumatic.      Right Ear: Tympanic membrane, ear canal and external ear normal.      Left Ear: Tympanic membrane, ear canal and external ear normal.      Nose: Nose normal.   Eyes:      Extraocular Movements: Extraocular movements intact.      Conjunctiva/sclera: Conjunctivae normal.      Pupils: Pupils are equal, round, and reactive to light.   Cardiovascular:      Rate and Rhythm: Normal rate and regular rhythm.      Heart sounds: Normal heart sounds.   Pulmonary:      Breath sounds: Decreased breath sounds present. No wheezing, rhonchi or rales.   Abdominal:      General: Bowel sounds are normal. There is no distension.      Palpations:  Abdomen is soft. There is no mass.      Tenderness: There is no abdominal tenderness.      Comments: Abdomen examined in seated position.    Musculoskeletal:         General: Normal range of motion.      Cervical back: Neck supple.      Comments: Patient is having some discomfort from approximately L1-L3 and patient can toe and heel walk and squat. Forward flexion is about 10%, extension 25%, right, left, lateral range and range of motion right and left is approximately 10%. patient does have straight leg raising that comes down into her right leg. Trendelenburg was normal. No pain with straight leg raise or either foot with dorsiflexion. Patient does have full abduction of both legs but does have pain with internal rotation of the right hip.    Skin:     General: Skin is warm.   Neurological:      General: No focal deficit present.      Mental Status: She is alert and oriented to person, place, and time.   Psychiatric:         Mood and Affect: Mood normal.         Behavior: Behavior normal.       PHQ-9 Total Score:      Assessment & Plan   Diagnoses and all orders for this visit:    1. Tobacco abuse (Primary)    2. Unilateral emphysema    3. Severe anxiety with panic  -     TSH; Future  -     Magnesium; Future    4. Right internal carotid artery aneurysm    5. Other hyperlipidemia  -     Comprehensive Metabolic Panel; Future  -     Lipid Panel; Future    6. Migraine with aura and without status migrainosus, not intractable    7. Primary hypertension  -     CBC Auto Differential; Future    8. Aneurysm of left internal carotid artery    9. Vitamin D deficiency  -     Vitamin D,25-Hydroxy; Future    10. Class 1 obesity due to excess calories with serious comorbidity and body mass index (BMI) of 34.0 to 34.9 in adult    11. Perimenopause  -     Follicle stimulating hormone; Future  -     Luteinizing hormone; Future  -     Estradiol; Future    12. Vitamin B12 deficiency  -     Vitamin B12; Future    13. Acute midline  low back pain with right-sided sciatica  -     XR Spine Lumbar Complete 4+VW; Future    14. Right hip pain  -     XR Hip With or Without Pelvis 2 - 3 View Right; Future    Other orders  -     nicotine (Nicoderm CQ) 14 MG/24HR patch; Place 1 patch on the skin as directed by provider Daily.  Dispense: 30 patch; Refill: 2  -     nicotine polacrilex (Nicorette) 4 MG lozenge; One lozenge in mouth up to every 6 hours  Dispense: 360 lozenge; Refill: 1  -     azithromycin (Zithromax Z-Moe) 250 MG tablet; Take 2 tablets by mouth on day 1, then 1 tablet daily on days 2-5  Dispense: 6 tablet; Refill: 0  -     benzonatate (Tessalon Perles) 100 MG capsule; Take 1 capsule by mouth 3 (Three) Times a Day As Needed for Cough.  Dispense: 30 capsule; Refill: 0  -     Blood Glucose Monitoring Suppl (Blood Glucose Monitor System) w/Device kit; Use 1 kit Daily.  Dispense: 1 each; Refill: 0    I will place an order for labs. Patient counseled on immunizations. Patient counseled on tobacco use and vaping. Will prescribe nicotine patch and lozenge. Will prescribe azithromycin. Patient will inform me if cough persists or fails to improve. Will prescribe Tessalon Perles. Advised to monitor saturated fat intake. Advised to send me 10 blood pressure readings over next 2 weeks.     Patient Instructions     Health Maintenance Due   Topic Date Due    COVID-19 Vaccine (1) Never done    Pneumococcal Vaccine 0-64 (2 - PCV) 02/05/2022    BMI FOLLOWUP  08/03/2022    LIPID PANEL  03/30/2023    INFLUENZA VACCINE  08/01/2023    Check blood pressure cuff for accuracy and send 10 blood pressures over 2 weeks.  Watch sodium, alcohol and weight.      Transcribed from ambient dictation for Erlinda Saleh MD by Mariaelena Case.  10/17/23   16:53 EDT    Patient or patient representative verbalized consent to the visit recording.  I have personally performed the services described in this document as transcribed by the above individual, and it is both  accurate and complete.

## 2023-10-18 ENCOUNTER — TELEPHONE (OUTPATIENT)
Dept: FAMILY MEDICINE CLINIC | Facility: CLINIC | Age: 43
End: 2023-10-18

## 2023-10-18 DIAGNOSIS — R11.0 NAUSEA: ICD-10-CM

## 2023-10-18 DIAGNOSIS — L50.9 URTICARIA: ICD-10-CM

## 2023-10-18 RX ORDER — CETIRIZINE HYDROCHLORIDE 10 MG/1
10 TABLET ORAL DAILY
Qty: 90 TABLET | Refills: 3 | Status: SHIPPED | OUTPATIENT
Start: 2023-10-18

## 2023-10-18 RX ORDER — ONDANSETRON 4 MG/1
4 TABLET, FILM COATED ORAL EVERY 8 HOURS PRN
Qty: 30 TABLET | Refills: 1 | Status: SHIPPED | OUTPATIENT
Start: 2023-10-18

## 2023-10-18 NOTE — TELEPHONE ENCOUNTER
Caller: Ally Condon    Relationship: Self    Best call back number: [phone]    What medication are you requesting:   BLOOD PRESSURE CUFF    Have you had these symptoms before:    [x] Yes  [] No    Have you been treated for these symptoms before:   [x] Yes  [] No    If a prescription is needed, what is your preferred pharmacy and phone number: [id number] - [place], [place] - [address] AT [facility] - [phone] [facility] [phone] FX     Additional notes:    DOCTOR FELTON SAID SHE WAS GOING TO SEND IN A BP CUFF BUT ACCIDENTALLY CALLED IN     Blood Glucose Monitoring Suppl (Blood Glucose Monitor System) w/Device kit     PLEASE CALL TO CONFIRM OR DENY.

## 2023-10-18 NOTE — TELEPHONE ENCOUNTER
Caller: Ally Condon    Relationship: Self    Best call back number: [phone]    Requested Prescriptions:   Requested Prescriptions     Pending Prescriptions Disp Refills    cetirizine (zyrTEC) 10 MG tablet 90 tablet 3     Sig: Take 1 tablet by mouth Daily.    ondansetron (Zofran) 4 MG tablet 30 tablet 1     Sig: Take 1 tablet by mouth Every 8 (Eight) Hours As Needed for Nausea or Vomiting.        Pharm[facility] where [facility] be sent[id number] - [place], [place] - [address] [facility] [phone] [facility] [phone]      Last office visit with prescribing clinician: 10/17/2023   Last telemedicine visit with prescribing clinician: Visit date not found   Next office visit with prescribing clinician: Visit date not found     Does the patient have less than a 3 day supply:  [x] Yes  [] No      Cali Belcher Rep   10/18/23 16:01 EDT

## 2023-10-19 DIAGNOSIS — I10 PRIMARY HYPERTENSION: Primary | ICD-10-CM

## 2023-10-19 NOTE — TELEPHONE ENCOUNTER
Sent again to [facility].  If they do not cover that then do try to get your blood pressure checked either at work or at pharmacy

## 2023-10-26 DIAGNOSIS — M54.41 ACUTE MIDLINE LOW BACK PAIN WITH RIGHT-SIDED SCIATICA: ICD-10-CM

## 2023-10-26 DIAGNOSIS — M25.551 RIGHT HIP PAIN: ICD-10-CM

## 2023-10-28 DIAGNOSIS — F41.0 SEVERE ANXIETY WITH PANIC: Chronic | ICD-10-CM

## 2023-10-28 DIAGNOSIS — G43.109 MIGRAINE WITH AURA AND WITHOUT STATUS MIGRAINOSUS, NOT INTRACTABLE: ICD-10-CM

## 2023-10-28 DIAGNOSIS — F32.1 CURRENT MODERATE EPISODE OF MAJOR DEPRESSIVE DISORDER WITHOUT PRIOR EPISODE: ICD-10-CM

## 2023-10-30 RX ORDER — ATORVASTATIN CALCIUM 20 MG/1
20 TABLET, FILM COATED ORAL DAILY
Qty: 30 TABLET | Refills: 0 | Status: SHIPPED | OUTPATIENT
Start: 2023-10-30 | End: 2023-10-30

## 2023-10-30 RX ORDER — AMITRIPTYLINE HYDROCHLORIDE 10 MG/1
10 TABLET, FILM COATED ORAL NIGHTLY
Qty: 30 TABLET | Refills: 0 | Status: SHIPPED | OUTPATIENT
Start: 2023-10-30

## 2023-10-30 RX ORDER — ATORVASTATIN CALCIUM 20 MG/1
20 TABLET, FILM COATED ORAL DAILY
Qty: 90 TABLET | Refills: 0 | Status: SHIPPED | OUTPATIENT
Start: 2023-10-30

## 2023-10-30 NOTE — TELEPHONE ENCOUNTER
Can give 30 days of meds if she appoints for fasting labs-it has been  over one year since she had liver functions checked

## 2023-10-30 NOTE — TELEPHONE ENCOUNTER
Rx Refill Note  Requested Prescriptions     Pending Prescriptions Disp Refills   • atorvastatin (LIPITOR) 20 MG tablet [Pharmacy Med Name: ATORVASTATIN 20MG TABLETS] 90 tablet 3     Sig: TAKE 1 TABLET BY MOUTH DAILY   • amitriptyline (ELAVIL) 10 MG tablet [Pharmacy Med Name: AMITRIPTYLINE 10MG TABLETS] 30 tablet 0     Sig: TAKE 1 TABLET BY MOUTH EVERY NIGHT      Last office visit with prescribing clinician: 10/17/2023   Last telemedicine visit with prescribing clinician: Visit date not found   Next office visit with prescribing clinician: Visit date not found       Would you like a call back once the refill request has been completed: [] Yes [] No    If the office needs to give you a call back, can they leave a voicemail: [] Yes [] No    Tiara Ochoa MA  10/30/23, 08:16 EDT

## 2023-10-30 NOTE — TELEPHONE ENCOUNTER
Rx Refill Note  Requested Prescriptions     Pending Prescriptions Disp Refills    atorvastatin (LIPITOR) 20 MG tablet [Pharmacy Med Name: ATORVASTATIN 20MG TABLETS] 90 tablet      Sig: TAKE 1 TABLET BY MOUTH DAILY      Last office visit with prescribing clinician: 10/17/2023   Last telemedicine visit with prescribing clinician: Visit date not found   Next office visit with prescribing clinician: Visit date not found                         Would you like a call back once the refill request has been completed: [] Yes [] No    If the office needs to give you a call back, can they leave a voicemail: [] Yes [] No    Vicki Morley MA  10/30/23, 14:24 EDT

## 2023-11-22 DIAGNOSIS — G43.109 MIGRAINE WITH AURA AND WITHOUT STATUS MIGRAINOSUS, NOT INTRACTABLE: ICD-10-CM

## 2023-11-22 DIAGNOSIS — F41.0 SEVERE ANXIETY WITH PANIC: Chronic | ICD-10-CM

## 2023-11-22 DIAGNOSIS — F32.1 CURRENT MODERATE EPISODE OF MAJOR DEPRESSIVE DISORDER WITHOUT PRIOR EPISODE: ICD-10-CM

## 2023-11-23 RX ORDER — AMITRIPTYLINE HYDROCHLORIDE 10 MG/1
10 TABLET, FILM COATED ORAL NIGHTLY
Qty: 30 TABLET | Refills: 0 | Status: SHIPPED | OUTPATIENT
Start: 2023-11-23

## 2023-12-03 NOTE — TELEPHONE ENCOUNTER
Rx Refill Note  Requested Prescriptions     Pending Prescriptions Disp Refills   • atorvastatin (LIPITOR) 20 MG tablet [Pharmacy Med Name: ATORVASTATIN 20MG TABLETS] 90 tablet 0     Sig: TAKE 1 TABLET BY MOUTH DAILY      Last office visit with prescribing clinician: 10/17/2023      Next office visit with prescribing clinician: Visit date not found   3}  Fnay Fragoso  12/03/23, 16:48 EST

## 2023-12-05 ENCOUNTER — OFFICE VISIT (OUTPATIENT)
Dept: PSYCHIATRY | Facility: CLINIC | Age: 43
End: 2023-12-05
Payer: COMMERCIAL

## 2023-12-05 DIAGNOSIS — F33.1 MAJOR DEPRESSIVE DISORDER, RECURRENT EPISODE, MODERATE: ICD-10-CM

## 2023-12-05 DIAGNOSIS — F41.1 GENERALIZED ANXIETY DISORDER: Primary | ICD-10-CM
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
## 2023-12-07 RX ORDER — ATORVASTATIN CALCIUM 20 MG/1
20 TABLET, FILM COATED ORAL DAILY
Qty: 90 TABLET | Refills: 0 | OUTPATIENT
Start: 2023-12-07

## 2023-12-08 RX ORDER — ATORVASTATIN CALCIUM 20 MG/1
20 TABLET, FILM COATED ORAL DAILY
Qty: 90 TABLET | Refills: 0 | Status: SHIPPED | OUTPATIENT
Start: 2023-12-08
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
## 2023-12-19 ENCOUNTER — OFFICE VISIT (OUTPATIENT)
Dept: PSYCHIATRY | Facility: CLINIC | Age: 43
End: 2023-12-19
Payer: COMMERCIAL

## 2023-12-19 DIAGNOSIS — F41.1 GENERALIZED ANXIETY DISORDER: Primary | ICD-10-CM

## 2023-12-19 DIAGNOSIS — F33.1 MAJOR DEPRESSIVE DISORDER, RECURRENT EPISODE, MODERATE: ICD-10-CM

## 2024-02-04 DIAGNOSIS — F41.1 GENERALIZED ANXIETY DISORDER: Chronic | ICD-10-CM

## 2024-02-04 DIAGNOSIS — G43.109 MIGRAINE WITH AURA AND WITHOUT STATUS MIGRAINOSUS, NOT INTRACTABLE: ICD-10-CM

## 2024-02-04 DIAGNOSIS — F41.0 SEVERE ANXIETY WITH PANIC: Chronic | ICD-10-CM

## 2024-02-04 DIAGNOSIS — F32.1 CURRENT MODERATE EPISODE OF MAJOR DEPRESSIVE DISORDER WITHOUT PRIOR EPISODE: ICD-10-CM

## 2024-02-04 DIAGNOSIS — K21.9 GASTROESOPHAGEAL REFLUX DISEASE, UNSPECIFIED WHETHER ESOPHAGITIS PRESENT: ICD-10-CM

## 2024-02-04 DIAGNOSIS — F41.0 PANIC DISORDER: Chronic | ICD-10-CM

## 2024-02-05 RX ORDER — AMITRIPTYLINE HYDROCHLORIDE 10 MG/1
10 TABLET, FILM COATED ORAL NIGHTLY
Qty: 30 TABLET | Refills: 0 | Status: SHIPPED | OUTPATIENT
Start: 2024-02-05

## 2024-02-05 RX ORDER — FAMOTIDINE 20 MG/1
20 TABLET, FILM COATED ORAL DAILY
Qty: 30 TABLET | Refills: 6 | Status: SHIPPED | OUTPATIENT
Start: 2024-02-05

## 2024-02-05 NOTE — TELEPHONE ENCOUNTER
[facility] Full Urine Drug Screen - (03/16/2023)     Inspect fill 12/30/23        Patient has appt tomorrow, and several no shows

## 2024-02-05 NOTE — TELEPHONE ENCOUNTER
Rx Refill Note  Requested Prescriptions     Pending Prescriptions Disp Refills   • amitriptyline (ELAVIL) 10 MG tablet [Pharmacy Med Name: AMITRIPTYLINE 10MG TABLETS] 30 tablet 0     Sig: TAKE 1 TABLET BY MOUTH EVERY NIGHT   • metoprolol tartrate (LOPRESSOR) 25 MG tablet [Pharmacy Med Name: METOPROLOL TARTRATE 25MG TABLETS] 180 tablet 3     Sig: TAKE 1 TABLET BY MOUTH TWICE DAILY      Last office visit with prescribing clinician: 10/17/2023      Next office visit with prescribing clinician: Visit date not found   3}  Fany Fragoso  02/05/24, 08:23 EST

## 2024-02-06 RX ORDER — CLONAZEPAM 0.5 MG/1
TABLET ORAL
Qty: 135 TABLET | OUTPATIENT
Start: 2024-02-06

## 2024-02-07 NOTE — PROGRESS NOTES
"Subjective   Ally Condon is a 43 y.o. female who presents today for follow-up for psychiatric medication management.      Chief Complaint:  Follow up for depression, anxiety, and insomnia.      History of Present Illness:     Medication adjustments last visit:   Continue Vilazodone 20mg daily.   Continue clonazepam 0.5mg TID PRN for anxiety.      She had a second interview with her company for  throughout the week. She is currently working on the weekend. She works in manufacturing.   She still feels like she is having a lot of depression. We discussed increasing vilazodone to 40mg.   She is having trouble sleeping. She feels like she is going through menopause.   She is taking clonazepam as needed. She is taking it 3-4 times per day.   Denies any suicidal thoughts.     Patient presents with symptoms and behaviors that are consistent with the following DSM-5 diagnoses:  Major depressive disorder  2. Generalized anxiety disorder  3. Panic disorder  4. Insomnia    The following portions of the patient's history were reviewed and updated as appropriate: allergies, current medications, past family history, past medical history, past social history, past surgical history and problem list.    PAST OUTPATIENT TREATMENT  Diagnosis treated:  Affective Disorder, Anxiety/Panic Disorder  Treatment Type:  Medication Management  Prior Psychiatric Medications:  Xanax - effective  Klonopin - effective in the past  Buspirone - ineffective after 2-3 months at uncertain dose.  Hydroxyzine - ineffective at 10mg  Amitriptyline  Lexapro - ineffective  Wellbutrin - ineffective  Pristiq - made her \"too happy,\" didn't like how it made her feel hot like BP increased.  Support Groups:  None  Sequelae Of Mental Disorder:  emotional distress    Interval History  Improved    Side Effects  None      Past Medical History:  Past Medical History:   Diagnosis Date    Abnormal ECG 08/2021    Aneurysm of left internal carotid artery " 2012    Anxiety     Depression     Generalized anxiety disorder 2023    Heart murmur     Hypertension     Other hyperlipidemia     Right internal carotid artery aneurysm 2014    Unilateral emphysema        Social History:  Social History     Socioeconomic History    Marital status:    Tobacco Use    Smoking status: Every Day     Packs/day: 0.50     Years: 20.00     Additional pack years: 0.00     Total pack years: 10.00     Types: Cigarettes    Smokeless tobacco: Never   Vaping Use    Vaping Use: Every day    Substances: Nicotine, Flavoring   Substance and Sexual Activity    Alcohol use: Yes     Comment: occassionally    Drug use: No    Sexual activity: Yes     Partners: Male     Birth control/protection: None, Tubal ligation       Family History:  Family History   Problem Relation Age of Onset    Anxiety disorder Maternal Grandmother        Past Surgical History:  Past Surgical History:   Procedure Laterality Date    APPENDECTOMY      ARTERIAL ANEURYSM REPAIR      Brain    BRAIN SURGERY      CEREBRAL ANGIOGRAM Right 2022    [facility]     SECTION      x2    CHOLECYSTECTOMY      COLON SURGERY      COLONOSCOPY      SMALL INTESTINE SURGERY      TONSILLECTOMY         Problem List:  Patient Active Problem List   Diagnosis    Right internal carotid artery aneurysm    Body mass index (BMI) of 24.0-24.9 in adult    Contact allergic reaction    Headache    Hidradenitis suppurativa    Hx of pancreatitis    Migraine    Tobacco abuse    Urticaria    Severe anxiety with panic    Unilateral emphysema    Right groin pain    Obesity    Class 1 obesity due to excess calories with serious comorbidity and body mass index (BMI) of 34.0 to 34.9 in adult    Hypertension    Other hyperlipidemia    Right elbow pain    Major depressive disorder with single episode, in full remission    Intractable right heel pain    Insomnia due to other mental disorder    Generalized anxiety disorder     Vitamin B12 deficiency       Allergy:   Allergies   Allergen Reactions    Penicillins Other (See Comments)     Childhood reaction: reaction unknown, but patient was hospitalized as a result.      Iodinated Contrast Media Hives     Per states she is not allergic to iodinated diagnostic agents.         Discontinued Medications:  Medications Discontinued During This Encounter   Medication Reason    vilazodone (VIIBRYD) 20 MG tablet tablet Reorder    clonazePAM (KlonoPIN) 0.5 MG tablet Reorder         Current Medications:   Current Outpatient Medications   Medication Sig Dispense Refill    clonazePAM (KlonoPIN) 0.5 MG tablet Take 1.5 tablets by mouth 3 (Three) Times a Day As Needed for Anxiety. 135 tablet 0    vilazodone (VIIBRYD) 40 MG tablet tablet Take 1 tablet by mouth Daily. 30 tablet 2    Acetaminophen (Tylenol) 325 MG capsule Take  by mouth.      adapalene (DIFFERIN) 0.3 % gel APPLY TOPICALLY TO THE AFFECTED AREA EVERY MORNING      albuterol sulfate  (90 Base) MCG/ACT inhaler INHALE 2 PUFFS BY MOUTH EVERY 4 HOURS AS NEEDED FOR WHEEZING 8.5 g 0    amitriptyline (ELAVIL) 10 MG tablet TAKE 1 TABLET BY MOUTH EVERY NIGHT 30 tablet 0    atorvastatin (LIPITOR) 20 MG tablet Take 1 tablet by mouth Daily. 90 tablet 0    azithromycin (Zithromax Z-Moe) 250 MG tablet Take 2 tablets by mouth on day 1, then 1 tablet daily on days 2-5 6 tablet 0    benzonatate (Tessalon Perles) 100 MG capsule Take 1 capsule by mouth 3 (Three) Times a Day As Needed for Cough. 30 capsule 0    Blood Glucose Monitoring Suppl (Blood Glucose Monitor System) w/Device kit Use 1 kit Daily. 1 each 0    cetirizine (zyrTEC) 10 MG tablet Take 1 tablet by mouth Daily. 90 tablet 3    famotidine (PEPCID) 20 MG tablet TAKE 1 TABLET BY MOUTH DAILY 30 tablet 6    Fluticasone Furoate-Vilanterol 100-25 MCG/ACT aerosol powder  Inhale 1 puff Daily. 1 each 5    hydrOXYzine (ATARAX) 25 MG tablet TAKE 1 TABLET BY MOUTH THREE TIMES DAILY AS NEEDED FOR ANXIETY 90  tablet 1    Melatonin 10 MG tablet Take 1 tablet by mouth Every Night.      metoprolol tartrate (LOPRESSOR) 25 MG tablet TAKE 1 TABLET BY MOUTH TWICE DAILY 180 tablet 3    nicotine (Nicoderm CQ) 14 MG/24HR patch Place 1 patch on the skin as directed by provider Daily. 30 patch 2    nicotine polacrilex (Nicorette) 4 MG lozenge One lozenge in mouth up to every 6 hours 360 lozenge 1    ondansetron (Zofran) 4 MG tablet Take 1 tablet by mouth Every 8 (Eight) Hours As Needed for Nausea or Vomiting. 30 tablet 1    tolterodine LA (DETROL LA) 2 MG 24 hr capsule Take 1 capsule by mouth 2 (Two) Times a Day.      tretinoin (RETIN-A) 0.1 % cream APPLY PEA SIZED AMOUNT TOPICALLY TO THE AFFECTED AREA AT BEDTIME       No current facility-administered medications for this visit.         Psychological ROS: positive for - anxiety, depression and sleep disturbances  negative for - behavioral disorder, concentration difficulties, decreased libido, disorientation, hallucinations, hostility, irritability, memory difficulties, mood swings, obsessive thoughts, physical abuse, sexual abuse or suicidal ideation      Physical Exam:   not currently breastfeeding.    Mental Status Exam:   Hygiene:   good  Cooperation:  Cooperative  Eye Contact:  Good  Psychomotor Behavior:  Appropriate  Affect:  Appropriate  Mood: euthymic  Hopelessness: Denies  Speech:  Normal  Thought Process:  Goal directed and Linear  Thought Content:  Normal  Suicidal:  None  Homicidal:  None  Hallucinations:  None  Delusion:  None  Memory:  Intact  Orientation:  Person, Place, Time and Situation  Reliability:  good  Insight:  Good  Judgement:  Good  Impulse Control:  Good  Physical/Medical Issues:  No      Mental status exam was reviewed and compared to visit on 5/11/23 and appropriate updates were made.     PHQ-9 Depression Screening    Little interest or pleasure in doing things? 2-->more than half the days   Feeling down, depressed, or hopeless? 2-->more than half the  days   Trouble falling or staying asleep, or sleeping too much? 2-->more than half the days   Feeling tired or having little energy? 2-->more than half the days   Poor appetite or overeating? 3-->nearly every day   Feeling bad about yourself - or that you are a failure or have let yourself or your family down? 2-->more than half the days   Trouble concentrating on things, such as reading the newspaper or watching television? 2-->more than half the days   Moving or speaking so slowly that other people could have noticed? Or the opposite - being so fidgety or restless that you have been moving around a lot more than usual? 2-->more than half the days   Thoughts that you would be better off dead, or of hurting yourself in some way? 1-->several days   PHQ-9 Total Score 18   If you checked off any problems, how difficult have these problems made it for you to do your work, take care of things at home, or get along with other people?          Current every day smoker less than 3 minutes spent counseling Not agreeable to stopping    I advised Ally of the risks of tobacco use.     Result Review:    Labs:  No visits with results within 3 Month(s) from this visit.   Latest known visit with results is:   Office Visit on 08/05/2022   Component Date Value Ref Range Status    Color 08/05/2022 Yellow  Yellow, Straw, Dark Yellow, Prisca Final    Clarity, UA 08/05/2022 Clear  Clear Final    Specific Gravity  08/05/2022 1.015  1.005 - 1.030 Final    pH, Urine 08/05/2022 6.5  5.0 - 8.0 Final    Leukocytes 08/05/2022 Negative  Negative Final    Nitrite, UA 08/05/2022 Negative  Negative Final    Protein, POC 08/05/2022 Negative  Negative mg/dL Final    Glucose, UA 08/05/2022 Negative  Negative mg/dL Final    Ketones, UA 08/05/2022 Negative  Negative Final    Urobilinogen, UA 08/05/2022 Normal  Normal Final    Bilirubin 08/05/2022 Negative  Negative Final    Blood, UA 08/05/2022 Negative  Negative Final    Lot Number 08/05/2022 [id number]    Final    Expiration Date 08/05/2022 02/28/2023   Final    COVID19 08/05/2022 Not Detected  Not Detected - Ref. Range Final       Assessment & Plan   Diagnoses and all orders for this visit:    1. Major depressive disorder, recurrent episode, moderate (Primary)  -     vilazodone (VIIBRYD) 40 MG tablet tablet; Take 1 tablet by mouth Daily.  Dispense: 30 tablet; Refill: 2    2. Generalized anxiety disorder  -     [facility] Full Urine Drug Screen -; Future  -     clonazePAM (KlonoPIN) 0.5 MG tablet; Take 1.5 tablets by mouth 3 (Three) Times a Day As Needed for Anxiety.  Dispense: 135 tablet; Refill: 0    3. Panic disorder  -     [facility] Full Urine Drug Screen -; Future  -     clonazePAM (KlonoPIN) 0.5 MG tablet; Take 1.5 tablets by mouth 3 (Three) Times a Day As Needed for Anxiety.  Dispense: 135 tablet; Refill: 0      Continue Vilazodone, increase to 40mg.   Continue clonazepam 0.75mg TID PRN for anxiety.      Update UDS today.     Visit Diagnoses:    ICD-10-CM ICD-9-CM   1. Major depressive disorder, recurrent episode, moderate  F33.1 296.32   2. Generalized anxiety disorder  F41.1 300.02   3. Panic disorder  F41.0 300.01       TREATMENT PLAN/GOALS: Continue supportive psychotherapy efforts and medications as indicated. Treatment and medication options discussed during today's visit. Patient ackowledged and verbally consented to continue with current treatment plan and was educated on the importance of compliance with treatment and follow-up appointments.    MEDICATION ISSUES:  INSPECT reviewed as expected.    Discussed medication options and treatment plan of prescribed medication as well as the risks, benefits, and side effects including potential falls, possible impaired driving and metabolic adversities among others. Patient is agreeable to call the office with any worsening of symptoms or onset of side effects. Patient is agreeable to call 911 or go to the nearest ER should he/she begin having SI/HI. No medication  side effects or related complaints today.     MEDS ORDERED DURING VISIT:  New Medications Ordered This Visit   Medications    vilazodone (VIIBRYD) 40 MG tablet tablet     Sig: Take 1 tablet by mouth Daily.     Dispense:  30 tablet     Refill:  2    clonazePAM (KlonoPIN) 0.5 MG tablet     Sig: Take 1.5 tablets by mouth 3 (Three) Times a Day As Needed for Anxiety.     Dispense:  135 tablet     Refill:  0       Return in about 3 months (around 5/8/2024).         This document has been electronically signed by JOHANNY Otoole  February 8, 2024 11:11 EST    Part of this note may be an electronic transcription/translation of spoken language to printed text using the Dragon Dictation System.

## 2024-02-08 ENCOUNTER — OFFICE VISIT (OUTPATIENT)
Dept: PSYCHIATRY | Facility: CLINIC | Age: 44
End: 2024-02-08
Payer: OTHER GOVERNMENT

## 2024-02-08 DIAGNOSIS — F41.0 PANIC DISORDER: Chronic | ICD-10-CM

## 2024-02-08 DIAGNOSIS — F41.1 GENERALIZED ANXIETY DISORDER: Chronic | ICD-10-CM

## 2024-02-08 DIAGNOSIS — F33.1 MAJOR DEPRESSIVE DISORDER, RECURRENT EPISODE, MODERATE: Primary | Chronic | ICD-10-CM

## 2024-02-08 RX ORDER — CLONAZEPAM 0.5 MG/1
0.75 TABLET ORAL 3 TIMES DAILY PRN
Qty: 135 TABLET | Refills: 0 | Status: SHIPPED | OUTPATIENT
Start: 2024-02-08

## 2024-02-08 RX ORDER — VILAZODONE HYDROCHLORIDE 40 MG/1
40 TABLET ORAL DAILY
Qty: 30 TABLET | Refills: 2 | Status: SHIPPED | OUTPATIENT
Start: 2024-02-08

## 2024-02-13 DIAGNOSIS — G43.109 MIGRAINE WITH AURA AND WITHOUT STATUS MIGRAINOSUS, NOT INTRACTABLE: ICD-10-CM

## 2024-02-13 DIAGNOSIS — F41.0 SEVERE ANXIETY WITH PANIC: Chronic | ICD-10-CM

## 2024-02-13 DIAGNOSIS — F32.1 CURRENT MODERATE EPISODE OF MAJOR DEPRESSIVE DISORDER WITHOUT PRIOR EPISODE: ICD-10-CM

## 2024-02-13 RX ORDER — AMITRIPTYLINE HYDROCHLORIDE 10 MG/1
10 TABLET, FILM COATED ORAL NIGHTLY
Qty: 30 TABLET | Refills: 0 | Status: SHIPPED | OUTPATIENT
Start: 2024-02-13

## 2024-03-15 ENCOUNTER — TELEPHONE (OUTPATIENT)
Dept: FAMILY MEDICINE CLINIC | Facility: CLINIC | Age: 44
End: 2024-03-15
Payer: OTHER GOVERNMENT

## 2024-03-15 NOTE — TELEPHONE ENCOUNTER
Please contact our office to schedule an appointment for 12 hour fasting labs. [phone]. You may message me back with a day and time you are willing to stop in and I will add you to the schedule.  Thank you and  have a great day.

## 2024-04-01 DIAGNOSIS — F41.1 GENERALIZED ANXIETY DISORDER: Chronic | ICD-10-CM

## 2024-04-01 DIAGNOSIS — F41.0 PANIC DISORDER: Chronic | ICD-10-CM

## 2024-04-02 DIAGNOSIS — F41.1 GENERALIZED ANXIETY DISORDER: Chronic | ICD-10-CM

## 2024-04-02 DIAGNOSIS — F41.0 PANIC DISORDER: Chronic | ICD-10-CM

## 2024-04-02 RX ORDER — CLONAZEPAM 0.5 MG/1
TABLET ORAL
Qty: 135 TABLET | Refills: 0 | Status: SHIPPED | OUTPATIENT
Start: 2024-04-02

## 2024-05-09 ENCOUNTER — TELEMEDICINE (OUTPATIENT)
Dept: PSYCHIATRY | Facility: CLINIC | Age: 44
End: 2024-05-09
Payer: OTHER GOVERNMENT

## 2024-05-09 DIAGNOSIS — F41.1 GENERALIZED ANXIETY DISORDER: Chronic | ICD-10-CM

## 2024-05-09 DIAGNOSIS — F41.0 PANIC DISORDER: Chronic | ICD-10-CM

## 2024-05-09 DIAGNOSIS — F33.1 MAJOR DEPRESSIVE DISORDER, RECURRENT EPISODE, MODERATE: Primary | Chronic | ICD-10-CM

## 2024-05-09 RX ORDER — ARIPIPRAZOLE 2 MG/1
2 TABLET ORAL DAILY
Qty: 30 TABLET | Refills: 1 | Status: SHIPPED | OUTPATIENT
Start: 2024-05-09

## 2024-05-09 RX ORDER — CLONAZEPAM 0.5 MG/1
0.75 TABLET ORAL 3 TIMES DAILY PRN
Qty: 135 TABLET | Refills: 1 | Status: SHIPPED | OUTPATIENT
Start: 2024-05-09

## 2024-05-09 RX ORDER — CLONAZEPAM 0.5 MG/1
0.5 TABLET ORAL 3 TIMES DAILY PRN
Qty: 90 TABLET | OUTPATIENT
Start: 2024-05-09

## 2024-05-09 RX ORDER — VILAZODONE HYDROCHLORIDE 40 MG/1
40 TABLET ORAL DAILY
Qty: 30 TABLET | Refills: 1 | Status: SHIPPED | OUTPATIENT
Start: 2024-05-09

## 2024-11-26 DIAGNOSIS — F41.0 PANIC DISORDER: Chronic | ICD-10-CM

## 2024-11-26 DIAGNOSIS — F41.1 GENERALIZED ANXIETY DISORDER: Chronic | ICD-10-CM

## 2024-11-26 NOTE — TELEPHONE ENCOUNTER
Rx Refill Note  Requested Prescriptions     Pending Prescriptions Disp Refills    clonazePAM (KlonoPIN) 0.5 MG tablet [Pharmacy Med Name: CLONAZEPAM 0.5MG TABLETS] 135 tablet      Sig: TAKE 1 AND 1/2 TABLETS BY MOUTH THREE TIMES DAILY AS NEEDED FOR ANXIETY        Last office visit with prescribing clinician: 2/8/2024     Next office visit with prescribing clinician: Visit date not found     Telemedicine with Tammi Fraire, APRN (05/09/2024)     [facility] Full Urine Drug Screen - (02/08/2024)     BEHAVIORAL HEALTH - SCAN - Inspect report, [place], 11/26/24 (11/26/2024)     Inspect Fill 10/14/2024    Jeimy Mazariegos  11/26/24, 13:25 EST     [facility] FULL FOR APPOINTMENT

## 2024-12-02 RX ORDER — CLONAZEPAM 0.5 MG/1
TABLET ORAL
Qty: 135 TABLET | Refills: 0 | Status: SHIPPED | OUTPATIENT
Start: 2024-12-02

## 2024-12-02 NOTE — PROGRESS NOTES
[facility]   [address], [address]  [place] [address] [phone]  Tammi Fraire, MSN, APRN, PMHNP-BC    Subjective   Ally Condon is a 44 y.o. female who presents today for follow-up for psychiatric medication management.      Chief Complaint:  Follow up for depression, anxiety, and insomnia.      History of Present Illness:     Medication adjustments last visit:   Continue Vilazodone 40mg.   Continue clonazepam 0.75mg TID PRN for anxiety.    Start abilify 2mg daily.     12/3/24: Patient here for follow up today. She state when we had our last visit, she was under an enormous amount of stress with her job. She ended up quitting that job in favor of her mental health. She says financially, she is having a harder time, but her stress level is a lot lower. During that time, she states she stopped taking her vilazodone. She does feel it was helping though, and she would like to go back on it. She doesn't think she ever picked up or started the abilify.     She keeps her grandson 3-4 days per week. She feels like he is on the autism spectrum. She is also working at a gas station. She would eventually like to get a regular, 40 hour/week job. Her daughter has also suggested she pay her to keep her grandson, but she feels like she shouldn't pay her since she is his grandma.     The clonazepam, she has been taking 1.5 tablets, about once a day, and sometimes a second time per day. We discussed changing to 1mg twice a day. She is going to call back and let me know if she would like me to change the prescription when she is due for a refill again.     Patient has never been diagnosed with ADHD, but states she feels as though she may have it. She described some episodes where she started a lot of home tasks, and didn't finish them. She started a remodeling project, and states she finished several other projects, and still hadn't finished the original room. She says her  daughter who is in school to be a doctor told her her behavior was abnormal and made her take a drug test. The patient feels as though this is possibly symptoms of ADHD. It sounded more like tyrone type symptoms. I advised her that we could refer her to [facility] for testing to clarify diagnosis. She was agreeable with that. We discussed potentially adding atomoxetine for ADHD in about a month, while she is waiting for testing with [facility].     She denies any SI/HI/AVH.   Will restart vilazodone at 10mg for 2 weeks, then increase to 20mg.   Continue clonazepam as needed. Potentially increase to 1mg/dose, but twice daily at future fill.  Follow up in 2 months.     Diagnostic Screening Tools:     Patient completed an ASRS screening, that appears indicative of ADHD.     Wender [place] Rating Scale for ADHD--25 Questions Associated with ADHD     As a child, I was:   3 Concentration problems, easily distracted  Very Much  4 Anxious, worrying 3-Quite a Bit  5 Nervous, fidgety  Very Much  6 Inattentive, daydreaming 3-Quite a Bit  7 Hot- or short-tempered, low boiling point  Very Much  9 Temper outbursts, tantrums 2-Moderately   10 Trouble with stick-to-it-tiveness, not following through. Failing to finish things started 3-Quite a Bit  11 Stubborn, strong-willed  Very Much  12 Sad or blue, depressed, unhappy 2-Moderately   15 Disobedient with parents, rebellious, sassy  Very Much  16 Low opinion of myself 2-Moderately   17 Irritable 3-Quite a Bit  20 Aiken, ups and downs 3-Quite a Bit  21 Angry 2-Moderately   24 Acting without thinking, impulsive  Very Much  25 Tendency to be immature 2-Moderately   26 Guilty feelings, regretful 3-Quite a Bit  27 Losing control of myself 2-Moderately   28 Tendency to be or act irrational 2-Moderately   29 Unpopular with other children, didn't keep friends for long, didn't get along with other children 2-Moderately   40 Trouble seeing things from someone else's point of view 3-Quite  "a Bit  41 Trouble with authorities, trouble with school,visits to principal's office 3-Quite a Bit    As a child in school I was (or had):  51 Overall a poor student, slow learner 0- Not at all or very slightly   56 Trouble with mathematics or numbers 2-Moderately   59 Not achieving up to potential 3-Quite a Bit    WURS-25: A score of >/= 46 is  considered elevated and possibly predictive of ADHD.     Patient Score: 65    5/9/24:Patient seen today via telehealth. Her daughter is graduating [employer] from [employer] with her nursing degree. She is excited about this. Her other daughter is also home from college.   She feels like she is still having some depression. She feels like some of it is circumstantial, but she was asking if we could increase her anti-depressant. She is on the highest dose of vilazodone, so we discussed augmenting with abilify. She was agreeable to trying this.   Will order Abilify 2mg daily.   Patient still using clonazepam for anxiety.     Patient presents with symptoms and behaviors that are consistent with the following DSM-5 diagnoses:  Major depressive disorder  2. Generalized anxiety disorder  3. Panic disorder  4. Insomnia    The following portions of the patient's history were reviewed and updated as appropriate: allergies, current medications, past family history, past medical history, past social history, past surgical history and problem list.    PAST OUTPATIENT TREATMENT  Diagnosis treated:  Affective Disorder, Anxiety/Panic Disorder  Treatment Type:  Medication Management  Prior Psychiatric Medications:  Xanax - effective  Klonopin - effective in the past  Buspirone - ineffective after 2-3 months at uncertain dose.  Hydroxyzine - ineffective at 10mg  Amitriptyline  Lexapro - ineffective  Wellbutrin - ineffective  Pristiq - made her \"too happy,\" didn't like how it made her feel hot like BP increased.  Lithium--at age 12.   Vilazodone---she was on this for a while, unsure if it was " helpful.   Support Groups:  None  Sequelae Of Mental Disorder:  emotional distress    Interval History  Improved    Side Effects  None      Past Medical History:  Past Medical History:   Diagnosis Date    Abnormal ECG 2021    Aneurysm of left internal carotid artery 2012    Heart murmur     Hypertension     Other hyperlipidemia     Right internal carotid artery aneurysm 2014    Unilateral emphysema        Social History:  Social History     Socioeconomic History    Marital status:    Tobacco Use    Smoking status: Every Day     Current packs/day: 0.50     Average packs/day: 0.5 packs/day for 20.0 years (10.0 ttl pk-yrs)     Types: Cigarettes    Smokeless tobacco: Never   Vaping Use    Vaping status: Every Day    Substances: Nicotine, Flavoring   Substance and Sexual Activity    Alcohol use: Yes     Comment: occassionally    Drug use: No    Sexual activity: Yes     Partners: Male     Birth control/protection: None, Tubal ligation       Family History:  Family History   Problem Relation Age of Onset    Anxiety disorder Maternal Grandmother        Past Surgical History:  Past Surgical History:   Procedure Laterality Date    APPENDECTOMY      ARTERIAL ANEURYSM REPAIR      Brain    BRAIN SURGERY      CEREBRAL ANGIOGRAM Right 2022    [place]     SECTION      x2    CHOLECYSTECTOMY      COLON SURGERY      COLONOSCOPY      SMALL INTESTINE SURGERY      TONSILLECTOMY         Problem List:  Patient Active Problem List   Diagnosis    Right internal carotid artery aneurysm    Body mass index (BMI) of 24.0-24.9 in adult    Contact allergic reaction    Headache    Hidradenitis suppurativa    Hx of pancreatitis    Migraine    Tobacco abuse    Urticaria    Severe anxiety with panic    Unilateral emphysema    Right groin pain    Obesity    Class 1 obesity due to excess calories with serious comorbidity and body mass index (BMI) of 34.0 to 34.9 in adult    Hypertension    Other  hyperlipidemia    Right elbow pain    Major depressive disorder with single episode, in full remission    Intractable right heel pain    Insomnia due to other mental disorder    Vitamin B12 deficiency       Allergy:   Allergies   Allergen Reactions    Penicillins Other (See Comments)     Childhood reaction: reaction unknown, but patient was hospitalized as a result.      Iodinated Contrast Media Hives     Per states she is not allergic to iodinated diagnostic agents.         Discontinued Medications:  Medications Discontinued During This Encounter   Medication Reason    adapalene (DIFFERIN) 0.3 % gel     albuterol sulfate  (90 Base) MCG/ACT inhaler     amitriptyline (ELAVIL) 10 MG tablet     ARIPiprazole (Abilify) 2 MG tablet     atorvastatin (LIPITOR) 20 MG tablet     azithromycin (Zithromax Z-Moe) 250 MG tablet     benzonatate (Tessalon Perles) 100 MG capsule     Blood Glucose Monitoring Suppl (Blood Glucose Monitor System) w/Device kit     cetirizine (zyrTEC) 10 MG tablet     famotidine (PEPCID) 20 MG tablet     Fluticasone Furoate-Vilanterol 100-25 MCG/ACT aerosol powder      hydrOXYzine (ATARAX) 25 MG tablet     Melatonin 10 MG tablet     metoprolol tartrate (LOPRESSOR) 25 MG tablet     nicotine (Nicoderm CQ) 14 MG/24HR patch     nicotine polacrilex (Nicorette) 4 MG lozenge     ondansetron (Zofran) 4 MG tablet     tolterodine LA (DETROL LA) 2 MG 24 hr capsule     tretinoin (RETIN-A) 0.1 % cream     vilazodone (VIIBRYD) 40 MG tablet tablet        Current Medications:   Current Outpatient Medications   Medication Sig Dispense Refill    Acetaminophen (Tylenol) 325 MG capsule Take  by mouth.      aspirin 81 MG EC tablet Take 1 tablet by mouth Daily.      clonazePAM (KlonoPIN) 0.5 MG tablet TAKE 1 AND 1/2 TABLETS BY MOUTH THREE TIMES DAILY AS NEEDED FOR ANXIETY 135 tablet 0    vilazodone (VIIBRYD) 10 MG tablet tablet Take 1 tablet by mouth Daily With Dinner. 14 tablet 0    vilazodone (VIIBRYD) 20 MG tablet  tablet Take 1 tablet by mouth Daily With Dinner. 30 tablet 0     No current facility-administered medications for this visit.     Physical Exam:   not currently breastfeeding.      MENTAL STATUS EXAM   General Appearance:  Cleanly groomed and dressed  Attitude:  Cooperative  Motor Activity:  Fidgety  Muscle Strength:  Normal  Speech:  Hyper talkative  Language:  Spontaneous  Mood and affect:  Depressed, anxious and elevated  Hopelessness:  Denies  Loneliness: Denies  Thought Process:  Flight of ideas  Associations/ Thought Content:  No delusions  Hallucinations:  None  Suicidal Ideations:  Not present  Homicidal Ideation:  Not present  Sensorium:  Alert  Orientation:  Person, place, time and situation  Immediate Recall, Recent, and Remote Memory:  Intact  Attention Span/ Concentration:  Easily distracted  Fund of Knowledge:  Appropriate for age and educational level  Intellectual Functioning:  Average range  Insight:  Fair  Judgement:  Fair  Reliability:  Fair  Impulse Control:  Fair     PHQ-9 Depression Screening  Little interest or pleasure in doing things? Several days   Feeling down, depressed, or hopeless? Several days   PHQ-2 Total Score 2   Trouble falling or staying asleep, or sleeping too much? Almost all   Feeling tired or having little energy? Over half   Poor appetite or overeating? Several days   Feeling bad about yourself - or that you are a failure or have let yourself or your family down? Over half   Trouble concentrating on things, such as reading the newspaper or watching television? Almost all   Moving or speaking so slowly that other people could have noticed? Or the opposite - being so fidgety or restless that you have been moving around a lot more than usual? Over half   Thoughts that you would be better off dead, or of hurting yourself in some way? Not at all   PHQ-9 Total Score 15   If you checked off any problems, how difficult have these problems made it for you to do your work, take care  of things at home, or get along with other people? Somewhat difficult      GAD7 Documentation:  Feeling nervous, anxious or on edge 3   Not being able to stop or control worrying 2   Worrying too much about different things 2   Trouble relaxing 2   Being so restless that it is hard to sit still 2   Becoming easily annoyed or irritable 2   Feeling Afraid as if something awful might happen 1   JIMENA Total Score 14   How difficult have these problems made it for you? Somewhat difficult        Current every day smoker less than 3 minutes spent counseling Not agreeable to stopping    I advised Ally of the risks of tobacco use.     Result Review:    Labs:  No visits with results within 3 Month(s) from this visit.   Latest known visit with results is:   Office Visit on 08/05/2022   Component Date Value Ref Range Status    Color 08/05/2022 Yellow  Yellow, Straw, Dark Yellow, Prisca Final    Clarity, UA 08/05/2022 Clear  Clear Final    Specific Gravity  08/05/2022 1.015  1.005 - 1.030 Final    pH, Urine 08/05/2022 6.5  5.0 - 8.0 Final    Leukocytes 08/05/2022 Negative  Negative Final    Nitrite, UA 08/05/2022 Negative  Negative Final    Protein, POC 08/05/2022 Negative  Negative mg/dL Final    Glucose, UA 08/05/2022 Negative  Negative mg/dL Final    Ketones, UA 08/05/2022 Negative  Negative Final    Urobilinogen, UA 08/05/2022 Normal  Normal Final    Bilirubin 08/05/2022 Negative  Negative Final    Blood, UA 08/05/2022 Negative  Negative Final    Lot Number 08/05/2022 [id number]   Final    Expiration Date 08/05/2022 02/28/2023   Final    COVID19 08/05/2022 Not Detected  Not Detected - Ref. Range Final       Assessment & Plan   Diagnoses and all orders for this visit:    1. Major depressive disorder, recurrent episode, moderate (Primary)  -     vilazodone (VIIBRYD) 10 MG tablet tablet; Take 1 tablet by mouth Daily With Dinner.  Dispense: 14 tablet; Refill: 0  -     vilazodone (VIIBRYD) 20 MG tablet tablet; Take 1 tablet by mouth  Daily With Dinner.  Dispense: 30 tablet; Refill: 0  -     ToxAssure Flex 22, Ur w/DL - Urine, Random Void  -     Ambulatory Referral to Psychology    2. Generalized anxiety disorder  -     vilazodone (VIIBRYD) 10 MG tablet tablet; Take 1 tablet by mouth Daily With Dinner.  Dispense: 14 tablet; Refill: 0  -     vilazodone (VIIBRYD) 20 MG tablet tablet; Take 1 tablet by mouth Daily With Dinner.  Dispense: 30 tablet; Refill: 0  -     ToxAssure Flex 22, Ur w/DL - Urine, Random Void  -     Ambulatory Referral to Psychology    3. Panic disorder  -     vilazodone (VIIBRYD) 10 MG tablet tablet; Take 1 tablet by mouth Daily With Dinner.  Dispense: 14 tablet; Refill: 0  -     vilazodone (VIIBRYD) 20 MG tablet tablet; Take 1 tablet by mouth Daily With Dinner.  Dispense: 30 tablet; Refill: 0  -     ToxAssure Flex 22, Ur w/DL - Urine, Random Void  -     Ambulatory Referral to Psychology    4. Encounter for long-term (current) use of medications  -     ToxAssure Flex 22, Ur w/DL - Urine, Random Void      Re-start vilazodone. 10mg for 2 weeks, then increase to 20mg.   Continue clonazepam 075mg. Patient is only taking 1-2 times daily, but feels like she may need slightly higher dose. Potentially change to 1mg BID at future fill.   Complete UDS.   Referral to [facility] for diagnostic clarification.     Visit Diagnoses:    ICD-10-CM ICD-9-CM   1. Major depressive disorder, recurrent episode, moderate  F33.1 296.32   2. Generalized anxiety disorder  F41.1 300.02   3. Panic disorder  F41.0 300.01   4. Encounter for long-term (current) use of medications  Z79.899 V58.69           TREATMENT PLAN/GOALS: Continue supportive psychotherapy efforts and medications as indicated. Treatment and medication options discussed during today's visit. Patient ackowledged and verbally consented to continue with current treatment plan and was educated on the importance of compliance with treatment and follow-up appointments.    MEDICATION  ISSUES:  INSPECT reviewed as expected.    Discussed medication options and treatment plan of prescribed medication as well as the risks, benefits, and side effects including potential falls, possible impaired driving and metabolic adversities among others. Patient is agreeable to call the office with any worsening of symptoms or onset of side effects. Patient is agreeable to call 911 or go to the nearest ER should he/she begin having SI/HI. No medication side effects or related complaints today.     MEDS ORDERED DURING VISIT:  New Medications Ordered This Visit   Medications    vilazodone (VIIBRYD) 10 MG tablet tablet     Sig: Take 1 tablet by mouth Daily With Dinner.     Dispense:  14 tablet     Refill:  0    vilazodone (VIIBRYD) 20 MG tablet tablet     Sig: Take 1 tablet by mouth Daily With Dinner.     Dispense:  30 tablet     Refill:  0     Start after 2 weeks on 10mg.       Return in about 2 months (around 2/3/2025).         This document has been electronically signed by JOHANNY Otoole  December 3, 2024 16:55 EST    Part of this note may be an electronic transcription/translation of spoken language to printed text using the Dragon Dictation System.

## 2024-12-03 ENCOUNTER — OFFICE VISIT (OUTPATIENT)
Dept: PSYCHIATRY | Facility: CLINIC | Age: 44
End: 2024-12-03
Payer: OTHER GOVERNMENT

## 2024-12-03 DIAGNOSIS — Z79.899 ENCOUNTER FOR LONG-TERM (CURRENT) USE OF MEDICATIONS: ICD-10-CM

## 2024-12-03 DIAGNOSIS — F41.0 PANIC DISORDER: Chronic | ICD-10-CM

## 2024-12-03 DIAGNOSIS — F41.1 GENERALIZED ANXIETY DISORDER: Chronic | ICD-10-CM

## 2024-12-03 DIAGNOSIS — F33.1 MAJOR DEPRESSIVE DISORDER, RECURRENT EPISODE, MODERATE: Primary | Chronic | ICD-10-CM

## 2024-12-03 PROCEDURE — 96127 BRIEF EMOTIONAL/BEHAV ASSMT: CPT

## 2024-12-03 PROCEDURE — 99214 OFFICE O/P EST MOD 30 MIN: CPT

## 2024-12-03 RX ORDER — ASPIRIN 81 MG/1
81 TABLET ORAL DAILY
COMMUNITY

## 2024-12-03 RX ORDER — VILAZODONE HYDROCHLORIDE 20 MG/1
20 TABLET ORAL
Qty: 30 TABLET | Refills: 0 | Status: SHIPPED | OUTPATIENT
Start: 2024-12-03

## 2024-12-03 RX ORDER — VILAZODONE HYDROCHLORIDE 10 MG/1
10 TABLET ORAL
Qty: 14 TABLET | Refills: 0 | Status: SHIPPED | OUTPATIENT
Start: 2024-12-03

## 2024-12-03 NOTE — PROGRESS NOTES
Ally verified her medication list. Off everything but Clonazepam and Aspirin.  Stated she does not do any CBD or TSH.  Gave an adequate amount of specimen for UDS

## 2024-12-06 LAB
1OH-MIDAZOLAM UR QL SCN: NOT DETECTED NG/MG CREAT
6MAM UR QL SCN: NEGATIVE NG/ML
7AMINOCLONAZEPAM/CREAT UR: 232 NG/MG CREAT
8OH-AMOXAPINE UR QL: NOT DETECTED
8OH-LOXAPINE UR QL SCN: NOT DETECTED
A-OH ALPRAZ/CREAT UR: NOT DETECTED NG/MG CREAT
A-OH-TRIAZOLAM/CREAT UR CFM: NOT DETECTED NG/MG CREAT
ALPRAZ/CREAT UR CFM: NOT DETECTED NG/MG CREAT
AMITRIP UR-MCNC: NOT DETECTED NG/ML
AMOXAPINE UR QL: NOT DETECTED
AMPHETAMINES UR QL SCN>500 NG/ML: NEGATIVE NG/ML
ANTICONVULSANTS UR: NEGATIVE
ANTIPSYCHOTICS UR: NEGATIVE
ARIPIPRAZOLE UR QL SCN: NOT DETECTED
ASENAPINE UR QL CFM: NOT DETECTED
BARBITURATES UR QL SCN: NEGATIVE NG/ML
BENZODIAZ SCN METH UR: NORMAL
BUPRENORPHINE UR QL SCN: NEGATIVE NG/ML
BUPROPION UR QL: NOT DETECTED
CANNABINOIDS UR QL SCN: NEGATIVE NG/ML
CARISOPRODOL UR QL: NEGATIVE NG/ML
CHLORPROMAZINE UR QL: NOT DETECTED
CITALOPRAM, UR: NOT DETECTED
CLOMIPRAMINE UR-MCNC: NOT DETECTED NG/ML
CLONAZEPAM/CREAT UR CFM: NOT DETECTED NG/MG CREAT
CLOZAPINE UR QL: NOT DETECTED
COCAINE+BZE UR QL SCN: NEGATIVE NG/ML
CREAT UR-MCNC: 81 MG/DL
DESALKYLFLURAZ/CREAT UR: NOT DETECTED NG/MG CREAT
DESIPRAMINE UR-MCNC: NOT DETECTED NG/ML
DIAZEPAM/CREAT UR: NOT DETECTED NG/MG CREAT
DOXEPIN UR-MCNC: NOT DETECTED NG/ML
DULOXETINE UR QL: NOT DETECTED
ETHANOL UR QL SCN: NEGATIVE NG/ML
FENTANYL UR QL SCN: NEGATIVE NG/ML
FLUNITRAZEPAM UR QL SCN: NOT DETECTED NG/MG CREAT
FLUOXETINE UR QL SCN: NOT DETECTED
FLUPHENAZINE UR-MCNC: NOT DETECTED NG/ML
FLUVOXAMINE UR QL: NOT DETECTED
GABAPENTIN UR-MCNC: NEGATIVE UG/ML
HALOPERIDOL UR QL: NOT DETECTED
ILOPERIDONE UR QL CFM: NOT DETECTED
IMIPRAMINE UR-MCNC: NOT DETECTED NG/ML
KRATOM IA, UR: NEGATIVE NG/ML
LORAZEPAM/CREAT UR: NOT DETECTED NG/MG CREAT
LOXAPINE UR QL: NOT DETECTED
LURASIDONE UR QL CFM: NOT DETECTED
MAPROTILINE UR QL: NOT DETECTED
ME-PHENIDATE UR QL SCN: NEGATIVE NG/ML
MESORIDAZINE UR QL: NOT DETECTED
METHADONE UR QL SCN: NEGATIVE NG/ML
METHADONE+METAB UR QL SCN: NEGATIVE NG/ML
MIDAZOLAM/CREAT UR CFM: NOT DETECTED NG/MG CREAT
MIRTAZAPINE UR-MCNC: NOT DETECTED UG/ML
MOLINDONE UR QL SCN: NOT DETECTED
NEFAZODONE UR QL: NOT DETECTED
NORCITALOPRAM UR QL: NOT DETECTED
NORCLOMIPRAMINE UR QL: NOT DETECTED
NORCLOZAPINE UR QL: NOT DETECTED
NORDIAZEPAM/CREAT UR: NOT DETECTED NG/MG CREAT
NORDOXEPIN UR QL: NOT DETECTED
NORFLUNITRAZEPAM UR-MCNC: NOT DETECTED NG/MG CREAT
NORFLUOXETINE UR-MCNC: NOT DETECTED NG/ML
NORSERTRALINE UR QL: NOT DETECTED
NORTRIP UR-MCNC: NOT DETECTED NG/ML
ODV UR-MCNC: NOT DETECTED NG/ML
OH-BUPROPION UR-MCNC: NOT DETECTED NG/ML
OLANZAPINE UR CFM-MCNC: NOT DETECTED NG/ML
OPIATES UR SCN-MCNC: NEGATIVE NG/ML
OXAZEPAM/CREAT UR: NOT DETECTED NG/MG CREAT
OXYCODONE CTO UR SCN-MCNC: NEGATIVE NG/ML
PAROXETINE UR-MCNC: NOT DETECTED NG/L
PCP UR QL SCN: NEGATIVE NG/ML
PERPHENAZINE UR QL: NOT DETECTED
PIMOZIDE, UR: NOT DETECTED
PREGABALIN UR QL SCN: NOT DETECTED
PRESCRIBED MEDICATIONS: NORMAL
PROCHLORPERAZINE UR QL: NOT DETECTED
PROPOXYPH UR QL SCN: NEGATIVE NG/ML
PROTRIP UR QL: NOT DETECTED
QUETIAPINE CTO UR CFM-MCNC: NOT DETECTED NG/ML
RISPERIDONE UR QL: NOT DETECTED
SERTRALINE UR-MCNC: NOT DETECTED NG/ML
TAPENTADOL CTO UR SCN-MCNC: NEGATIVE NG/ML
TEMAZEPAM/CREAT UR: NOT DETECTED NG/MG CREAT
THIORIDAZINE UR-MCNC: NOT DETECTED UG/ML
THIOTHIXENE UR QL: NOT DETECTED
TRAMADOL UR QL SCN: NEGATIVE NG/ML
TRAZODONE UR QL: NOT DETECTED
TRAZODONE UR-MCNC: NOT DETECTED UG/ML
TRICYCLICS TESTED UR SCN: NEGATIVE
TRIFPERAZINE UR QL: NOT DETECTED
TRIMIPRAMINE UR QL: NOT DETECTED
VENLAFAXINE UR QL: NOT DETECTED
VILAZ UR QL SCN: NOT DETECTED
ZIPRASIDONE UR QL SCN: NOT DETECTED

## 2025-07-10 ENCOUNTER — TELEPHONE (OUTPATIENT)
Dept: PSYCHIATRY | Facility: CLINIC | Age: 45
End: 2025-07-10
Payer: OTHER GOVERNMENT

## 2025-07-10 DIAGNOSIS — F33.1 MAJOR DEPRESSIVE DISORDER, RECURRENT EPISODE, MODERATE: Chronic | ICD-10-CM

## 2025-07-10 DIAGNOSIS — F41.0 PANIC DISORDER: Chronic | ICD-10-CM

## 2025-07-10 DIAGNOSIS — F41.1 GENERALIZED ANXIETY DISORDER: Chronic | ICD-10-CM

## 2025-07-10 NOTE — TELEPHONE ENCOUNTER
Ally called asking if she can get into see one of the providers.  She is struggling. Her dad passed away last night and she is out of all her medications.  She could not come in until she got some hours with her new job.  She can come in now for an appointment.    She didn't know if meds could be sent in until she could come in because of the providers schedule or what should she do??

## 2025-07-11 NOTE — TELEPHONE ENCOUNTER
Her Viibryd and Clonazepam. She has been taking it off and on for months now, a day, here and there. Last was 4 days ago, now she is completely out.

## 2025-07-14 RX ORDER — CLONAZEPAM 0.5 MG/1
0.75 TABLET ORAL DAILY PRN
Qty: 45 TABLET | Refills: 0 | Status: SHIPPED | OUTPATIENT
Start: 2025-07-14

## 2025-07-14 RX ORDER — VILAZODONE HYDROCHLORIDE 20 MG/1
20 TABLET ORAL
Qty: 30 TABLET | Refills: 0 | Status: SHIPPED | OUTPATIENT
Start: 2025-07-14

## 2025-07-14 RX ORDER — VILAZODONE HYDROCHLORIDE 10 MG/1
10 TABLET ORAL
Qty: 7 TABLET | Refills: 0 | Status: SHIPPED | OUTPATIENT
Start: 2025-07-14

## 2025-07-14 NOTE — TELEPHONE ENCOUNTER
I am sending in viibryd taper if needed - start 10 mg x7days then 20 mg daily   Will send in clonazepam with reduced quantity 45 tablets 0.5 mg - 0.75 mg daily prn (radha last note) one month supply - The pt has not filled since 3/24/25  and reported not taking very often. I don't know when first available is but can ask Dung- can also provide other resources if she needs to be seen sooner

## 2025-07-14 NOTE — TELEPHONE ENCOUNTER
Office Visit with Tammi Fraire APRN (12/03/2024)     BEHAVIORAL HEALTH - SCAN - Inspect report, Emily, 7/14/25 (07/14/2025)     Viibryd was last filled 10 mg 12/3/24 for 14 days, and then the 20 mg for 30 days there after.

## 2025-07-25 ENCOUNTER — TELEPHONE (OUTPATIENT)
Dept: PSYCHIATRY | Facility: CLINIC | Age: 45
End: 2025-07-25
Payer: OTHER GOVERNMENT

## 2025-07-25 NOTE — TELEPHONE ENCOUNTER
Ally called crying. She is struggling severely. She just buried her dad and now she is in [facility] with her Father in Law on a vent. They have been told he might make it through the night.  She is asking if there is any way someone could please see her sooner than September.

## 2025-07-28 NOTE — TELEPHONE ENCOUNTER
Please advise to call office for cancellations     If depression is getting worse, she can try to see other providers , may be virtual clinic    If she stars having SI/HI please recommend to go to [facility] or [facility]

## 2025-08-13 DIAGNOSIS — F41.0 PANIC DISORDER: Chronic | ICD-10-CM

## 2025-08-13 DIAGNOSIS — F41.1 GENERALIZED ANXIETY DISORDER: Chronic | ICD-10-CM

## 2025-08-15 RX ORDER — CLONAZEPAM 0.5 MG/1
0.75 TABLET ORAL DAILY PRN
Qty: 45 TABLET | Refills: 0 | Status: SHIPPED | OUTPATIENT
Start: 2025-08-15